# Patient Record
Sex: FEMALE | Race: WHITE | NOT HISPANIC OR LATINO | URBAN - METROPOLITAN AREA
[De-identification: names, ages, dates, MRNs, and addresses within clinical notes are randomized per-mention and may not be internally consistent; named-entity substitution may affect disease eponyms.]

---

## 2017-12-21 ENCOUNTER — EMERGENCY (EMERGENCY)
Facility: HOSPITAL | Age: 82
LOS: 0 days | Discharge: HOME | End: 2017-12-21
Admitting: INTERNAL MEDICINE

## 2017-12-21 DIAGNOSIS — R07.81 PLEURODYNIA: ICD-10-CM
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
## 2017-12-21 DIAGNOSIS — M25.511 PAIN IN RIGHT SHOULDER: ICD-10-CM

## 2017-12-21 DIAGNOSIS — I10 ESSENTIAL (PRIMARY) HYPERTENSION: ICD-10-CM
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
## 2018-01-01 ENCOUNTER — EMERGENCY (EMERGENCY)
Facility: HOSPITAL | Age: 83
LOS: 0 days | Discharge: HOME | End: 2018-04-03
Attending: EMERGENCY MEDICINE

## 2018-01-01 VITALS
RESPIRATION RATE: 20 BRPM | DIASTOLIC BLOOD PRESSURE: 96 MMHG | SYSTOLIC BLOOD PRESSURE: 186 MMHG | HEART RATE: 88 BPM | TEMPERATURE: 97 F | OXYGEN SATURATION: 98 % | WEIGHT: 149.91 LBS

## 2018-01-01 DIAGNOSIS — S80.812A ABRASION, LEFT LOWER LEG, INITIAL ENCOUNTER: ICD-10-CM
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
## 2018-01-01 DIAGNOSIS — Z23 ENCOUNTER FOR IMMUNIZATION: ICD-10-CM

## 2018-01-01 DIAGNOSIS — S80.12XA CONTUSION OF LEFT LOWER LEG, INITIAL ENCOUNTER: ICD-10-CM

## 2018-01-01 RX ORDER — TETANUS TOXOID, REDUCED DIPHTHERIA TOXOID AND ACELLULAR PERTUSSIS VACCINE, ADSORBED 5; 2.5; 8; 8; 2.5 [IU]/.5ML; [IU]/.5ML; UG/.5ML; UG/.5ML; UG/.5ML
0.5 SUSPENSION INTRAMUSCULAR ONCE
Qty: 0 | Refills: 0 | Status: DISCONTINUED | OUTPATIENT
Start: 2018-01-01 | End: 2018-01-01

## 2018-01-01 RX ORDER — TETANUS TOXOID, REDUCED DIPHTHERIA TOXOID AND ACELLULAR PERTUSSIS VACCINE, ADSORBED 5; 2.5; 8; 8; 2.5 [IU]/.5ML; [IU]/.5ML; UG/.5ML; UG/.5ML; UG/.5ML
0.5 SUSPENSION INTRAMUSCULAR ONCE
Qty: 0 | Refills: 0 | Status: COMPLETED | OUTPATIENT
Start: 2018-01-01 | End: 2018-01-01

## 2018-01-01 RX ORDER — ACETAMINOPHEN 500 MG
975 TABLET ORAL ONCE
Qty: 0 | Refills: 0 | Status: COMPLETED | OUTPATIENT
Start: 2018-01-01 | End: 2018-01-01

## 2018-01-01 RX ADMIN — Medication 975 MILLIGRAM(S): at 22:08

## 2018-01-01 RX ADMIN — TETANUS TOXOID, REDUCED DIPHTHERIA TOXOID AND ACELLULAR PERTUSSIS VACCINE, ADSORBED 0.5 MILLILITER(S): 5; 2.5; 8; 8; 2.5 SUSPENSION INTRAMUSCULAR at 22:08

## 2018-04-03 NOTE — ED PROVIDER NOTE - CARE PLAN
Principal Discharge DX:	Contusion of leg, left, initial encounter  Secondary Diagnosis:	Abrasion of leg, left

## 2018-04-03 NOTE — ED PROVIDER NOTE - OBJECTIVE STATEMENT
89 y/o female s/p fall sustaining abrasion and swelling to left anterior lower leg. no head injury, neck or back pain. patient unknown tetanus vaccination hx . no hip pain , tingling to distal extremity

## 2018-04-03 NOTE — ED PROVIDER NOTE - NS ED ROS FT
Review of Systems    Constitutional: (-) fever/ chills (-) weight loss  Eyes/ENT: (-) blurry vision, (-) epistaxis (-) sore throat (-) ear pain  Cardiovascular: (-) chest pain, (-) syncope (-) palpitations  Respiratory: (-) cough, (-) shortness of breath  Gastrointestinal: (-) vomiting, (-) diarrhea (-) abdominal pain  Musculoskeletal: (-) neck pain, (-) back pain, (-) joint pain (-) pedal edema   Integumentary: (-) rash, (-) swelling  Neurological: (-) headache, (-) altered mental status  Psychiatric: (-) hallucinations or depression   Allergic/Immunologic: (-) pruritus

## 2018-04-03 NOTE — ED PROVIDER NOTE - ATTENDING CONTRIBUTION TO CARE
89 yo f not on blood thinners presents with left leg injury.  pt was walking down steps and slipped and cut left leg.  no head injury, no loc, no neck pain, no neck stiffness.  no numbness.  pt only with leg injury.  awake, alert.  LLE: 2+ dp pulse, motor/sensation intact in foot.  no knee tenderness.  lower leg with abrasion no laceration, nothing to be repaired or sutured.  no foot tenderness.  p:  tetanus, xray, dc with outpatient follow up.

## 2019-01-01 ENCOUNTER — OUTPATIENT (OUTPATIENT)
Dept: OUTPATIENT SERVICES | Facility: HOSPITAL | Age: 84
LOS: 1 days | Discharge: HOME | End: 2019-01-01

## 2019-01-01 ENCOUNTER — INPATIENT (INPATIENT)
Facility: HOSPITAL | Age: 84
LOS: 10 days | End: 2019-03-08
Attending: INTERNAL MEDICINE | Admitting: INTERNAL MEDICINE
Payer: MEDICARE

## 2019-01-01 ENCOUNTER — INPATIENT (INPATIENT)
Facility: HOSPITAL | Age: 84
LOS: 8 days | Discharge: NURSING, REHAB OR LONG-TERM CARE | End: 2019-01-26
Attending: INTERNAL MEDICINE | Admitting: INTERNAL MEDICINE

## 2019-01-01 VITALS
DIASTOLIC BLOOD PRESSURE: 51 MMHG | RESPIRATION RATE: 18 BRPM | HEART RATE: 138 BPM | SYSTOLIC BLOOD PRESSURE: 98 MMHG | TEMPERATURE: 97 F

## 2019-01-01 VITALS
DIASTOLIC BLOOD PRESSURE: 118 MMHG | OXYGEN SATURATION: 100 % | RESPIRATION RATE: 20 BRPM | HEART RATE: 74 BPM | SYSTOLIC BLOOD PRESSURE: 115 MMHG | TEMPERATURE: 96 F | WEIGHT: 119.05 LBS

## 2019-01-01 VITALS
RESPIRATION RATE: 20 BRPM | OXYGEN SATURATION: 99 % | HEART RATE: 84 BPM | DIASTOLIC BLOOD PRESSURE: 67 MMHG | TEMPERATURE: 97 F | SYSTOLIC BLOOD PRESSURE: 141 MMHG

## 2019-01-01 VITALS — TEMPERATURE: 96 F | RESPIRATION RATE: 18 BRPM | HEART RATE: 74 BPM
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
## 2019-01-01 DIAGNOSIS — R79.9 ABNORMAL FINDING OF BLOOD CHEMISTRY, UNSPECIFIED: ICD-10-CM

## 2019-01-01 DIAGNOSIS — E87.1 HYPO-OSMOLALITY AND HYPONATREMIA: ICD-10-CM

## 2019-01-01 DIAGNOSIS — K59.00 CONSTIPATION, UNSPECIFIED: ICD-10-CM

## 2019-01-01 DIAGNOSIS — R01.1 CARDIAC MURMUR, UNSPECIFIED: ICD-10-CM

## 2019-01-01 DIAGNOSIS — Z02.9 ENCOUNTER FOR ADMINISTRATIVE EXAMINATIONS, UNSPECIFIED: ICD-10-CM

## 2019-01-01 DIAGNOSIS — I10 ESSENTIAL (PRIMARY) HYPERTENSION: ICD-10-CM

## 2019-01-01 DIAGNOSIS — D64.9 ANEMIA, UNSPECIFIED: ICD-10-CM

## 2019-01-01 DIAGNOSIS — S01.01XA LACERATION WITHOUT FOREIGN BODY OF SCALP, INITIAL ENCOUNTER: ICD-10-CM

## 2019-01-01 DIAGNOSIS — R19.7 DIARRHEA, UNSPECIFIED: ICD-10-CM

## 2019-01-01 DIAGNOSIS — S72.142A DISPLACED INTERTROCHANTERIC FRACTURE OF LEFT FEMUR, INITIAL ENCOUNTER FOR CLOSED FRACTURE: ICD-10-CM

## 2019-01-01 DIAGNOSIS — Z79.82 LONG TERM (CURRENT) USE OF ASPIRIN: ICD-10-CM

## 2019-01-01 DIAGNOSIS — E83.52 HYPERCALCEMIA: ICD-10-CM

## 2019-01-01 DIAGNOSIS — E87.5 HYPERKALEMIA: ICD-10-CM

## 2019-01-01 DIAGNOSIS — E86.0 DEHYDRATION: ICD-10-CM
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
## 2019-01-01 DIAGNOSIS — N17.9 ACUTE KIDNEY FAILURE, UNSPECIFIED: ICD-10-CM

## 2019-01-01 DIAGNOSIS — D62 ACUTE POSTHEMORRHAGIC ANEMIA: ICD-10-CM

## 2019-01-01 LAB
-  AMPICILLIN: SIGNIFICANT CHANGE UP
-  CIPROFLOXACIN: SIGNIFICANT CHANGE UP
-  DAPTOMYCIN: SIGNIFICANT CHANGE UP
-  ERYTHROMYCIN: SIGNIFICANT CHANGE UP
-  LEVOFLOXACIN: SIGNIFICANT CHANGE UP
-  LINEZOLID: SIGNIFICANT CHANGE UP
-  PENICILLIN: SIGNIFICANT CHANGE UP
-  RIFAMPIN: SIGNIFICANT CHANGE UP
-  TETRACYCLINE: SIGNIFICANT CHANGE UP
-  VANCOMYCIN: SIGNIFICANT CHANGE UP
A1AT SERPL-MCNC: 141 MG/DL — SIGNIFICANT CHANGE UP (ref 90–200)
AFP-TM SERPL-MCNC: 911 NG/ML — HIGH
ALBUMIN SERPL ELPH-MCNC: 1.7 G/DL — LOW (ref 3.5–5.2)
ALBUMIN SERPL ELPH-MCNC: 1.8 G/DL — LOW (ref 3.5–5.2)
ALBUMIN SERPL ELPH-MCNC: 1.9 G/DL — LOW (ref 3.5–5.2)
ALBUMIN SERPL ELPH-MCNC: 2.2 G/DL — LOW (ref 3.5–5.2)
ALBUMIN SERPL ELPH-MCNC: 2.3 G/DL — LOW (ref 3.5–5.2)
ALBUMIN SERPL ELPH-MCNC: 2.7 G/DL — LOW (ref 3.5–5.2)
ALBUMIN SERPL ELPH-MCNC: 2.9 G/DL — LOW (ref 3.5–5.2)
ALP SERPL-CCNC: 145 U/L — HIGH (ref 30–115)
ALP SERPL-CCNC: 146 U/L — HIGH (ref 30–115)
ALP SERPL-CCNC: 155 U/L — HIGH (ref 30–115)
ALP SERPL-CCNC: 163 U/L — HIGH (ref 30–115)
ALP SERPL-CCNC: 167 U/L — HIGH (ref 30–115)
ALP SERPL-CCNC: 170 U/L — HIGH (ref 30–115)
ALP SERPL-CCNC: 180 U/L — HIGH (ref 30–115)
ALP SERPL-CCNC: 189 U/L — HIGH (ref 30–115)
ALP SERPL-CCNC: 261 U/L — HIGH (ref 30–115)
ALT FLD-CCNC: 29 U/L — SIGNIFICANT CHANGE UP (ref 0–41)
ALT FLD-CCNC: 34 U/L — SIGNIFICANT CHANGE UP (ref 0–41)
ALT FLD-CCNC: 39 U/L — SIGNIFICANT CHANGE UP (ref 0–41)
ALT FLD-CCNC: 44 U/L — HIGH (ref 0–41)
ALT FLD-CCNC: 45 U/L — HIGH (ref 0–41)
ALT FLD-CCNC: 49 U/L — HIGH (ref 0–41)
ALT FLD-CCNC: 50 U/L — HIGH (ref 0–41)
ALT FLD-CCNC: 51 U/L — HIGH (ref 0–41)
ALT FLD-CCNC: 51 U/L — HIGH (ref 0–41)
ANA PAT FLD IF-IMP: ABNORMAL
ANA PATTERN 2: ABNORMAL
ANA TITR SER: ABNORMAL
ANION GAP SERPL CALC-SCNC: 10 MMOL/L — SIGNIFICANT CHANGE UP (ref 7–14)
ANION GAP SERPL CALC-SCNC: 10 MMOL/L — SIGNIFICANT CHANGE UP (ref 7–14)
ANION GAP SERPL CALC-SCNC: 12 MMOL/L — SIGNIFICANT CHANGE UP (ref 7–14)
ANION GAP SERPL CALC-SCNC: 13 MMOL/L — SIGNIFICANT CHANGE UP (ref 7–14)
ANION GAP SERPL CALC-SCNC: 13 MMOL/L — SIGNIFICANT CHANGE UP (ref 7–14)
ANION GAP SERPL CALC-SCNC: 14 MMOL/L — SIGNIFICANT CHANGE UP (ref 7–14)
ANION GAP SERPL CALC-SCNC: 15 MMOL/L — HIGH (ref 7–14)
ANION GAP SERPL CALC-SCNC: 15 MMOL/L — HIGH (ref 7–14)
ANION GAP SERPL CALC-SCNC: 16 MMOL/L — HIGH (ref 7–14)
ANION GAP SERPL CALC-SCNC: 17 MMOL/L — HIGH (ref 7–14)
ANION GAP SERPL CALC-SCNC: 8 MMOL/L — SIGNIFICANT CHANGE UP (ref 7–14)
ANION GAP SERPL CALC-SCNC: 9 MMOL/L — SIGNIFICANT CHANGE UP (ref 7–14)
ANION GAP SERPL CALC-SCNC: 9 MMOL/L — SIGNIFICANT CHANGE UP (ref 7–14)
ANTI NUCLEAR FACTOR TITER 2: ABNORMAL
APTT BLD: 127.7 SEC — CRITICAL HIGH (ref 27–39.2)
APTT BLD: 30.5 SEC — SIGNIFICANT CHANGE UP (ref 27–39.2)
APTT BLD: 31.2 SEC — SIGNIFICANT CHANGE UP (ref 27–39.2)
APTT BLD: 31.6 SEC — SIGNIFICANT CHANGE UP (ref 27–39.2)
APTT BLD: 33.4 SEC — SIGNIFICANT CHANGE UP (ref 27–39.2)
APTT BLD: 33.6 SEC — SIGNIFICANT CHANGE UP (ref 27–39.2)
APTT BLD: 70.4 SEC — CRITICAL HIGH (ref 27–39.2)
APTT BLD: 70.6 SEC — CRITICAL HIGH (ref 27–39.2)
APTT BLD: 80 SEC — CRITICAL HIGH (ref 27–39.2)
APTT BLD: >200 SEC — CRITICAL HIGH (ref 27–39.2)
AST SERPL-CCNC: 100 U/L — HIGH (ref 0–41)
AST SERPL-CCNC: 107 U/L — HIGH (ref 0–41)
AST SERPL-CCNC: 108 U/L — HIGH (ref 0–41)
AST SERPL-CCNC: 129 U/L — HIGH (ref 0–41)
AST SERPL-CCNC: 145 U/L — HIGH (ref 0–41)
AST SERPL-CCNC: 77 U/L — HIGH (ref 0–41)
AST SERPL-CCNC: 79 U/L — HIGH (ref 0–41)
AST SERPL-CCNC: 88 U/L — HIGH (ref 0–41)
AST SERPL-CCNC: 95 U/L — HIGH (ref 0–41)
BASOPHILS # BLD AUTO: 0.01 K/UL — SIGNIFICANT CHANGE UP (ref 0–0.2)
BASOPHILS # BLD AUTO: 0.02 K/UL — SIGNIFICANT CHANGE UP (ref 0–0.2)
BASOPHILS # BLD AUTO: 0.02 K/UL — SIGNIFICANT CHANGE UP (ref 0–0.2)
BASOPHILS # BLD AUTO: 0.03 K/UL — SIGNIFICANT CHANGE UP (ref 0–0.2)
BASOPHILS # BLD AUTO: 0.04 K/UL — SIGNIFICANT CHANGE UP (ref 0–0.2)
BASOPHILS # BLD AUTO: 0.04 K/UL — SIGNIFICANT CHANGE UP (ref 0–0.2)
BASOPHILS NFR BLD AUTO: 0.1 % — SIGNIFICANT CHANGE UP (ref 0–1)
BASOPHILS NFR BLD AUTO: 0.2 % — SIGNIFICANT CHANGE UP (ref 0–1)
BASOPHILS NFR BLD AUTO: 0.4 % — SIGNIFICANT CHANGE UP (ref 0–1)
BASOPHILS NFR BLD AUTO: 0.5 % — SIGNIFICANT CHANGE UP (ref 0–1)
BILIRUB SERPL-MCNC: 0.5 MG/DL — SIGNIFICANT CHANGE UP (ref 0.2–1.2)
BILIRUB SERPL-MCNC: 0.6 MG/DL — SIGNIFICANT CHANGE UP (ref 0.2–1.2)
BILIRUB SERPL-MCNC: 0.6 MG/DL — SIGNIFICANT CHANGE UP (ref 0.2–1.2)
BILIRUB SERPL-MCNC: 0.8 MG/DL — SIGNIFICANT CHANGE UP (ref 0.2–1.2)
BILIRUB SERPL-MCNC: 0.8 MG/DL — SIGNIFICANT CHANGE UP (ref 0.2–1.2)
BILIRUB SERPL-MCNC: 0.9 MG/DL — SIGNIFICANT CHANGE UP (ref 0.2–1.2)
BILIRUB SERPL-MCNC: 0.9 MG/DL — SIGNIFICANT CHANGE UP (ref 0.2–1.2)
BILIRUB SERPL-MCNC: 1 MG/DL — SIGNIFICANT CHANGE UP (ref 0.2–1.2)
BILIRUB SERPL-MCNC: 1 MG/DL — SIGNIFICANT CHANGE UP (ref 0.2–1.2)
BLD GP AB SCN SERPL QL: SIGNIFICANT CHANGE UP
BUN SERPL-MCNC: 105 MG/DL — CRITICAL HIGH (ref 10–20)
BUN SERPL-MCNC: 106 MG/DL — CRITICAL HIGH (ref 10–20)
BUN SERPL-MCNC: 108 MG/DL — CRITICAL HIGH (ref 10–20)
BUN SERPL-MCNC: 109 MG/DL — CRITICAL HIGH (ref 10–20)
BUN SERPL-MCNC: 110 MG/DL — CRITICAL HIGH (ref 10–20)
BUN SERPL-MCNC: 111 MG/DL — CRITICAL HIGH (ref 10–20)
BUN SERPL-MCNC: 115 MG/DL — CRITICAL HIGH (ref 10–20)
BUN SERPL-MCNC: 28 MG/DL — HIGH (ref 10–20)
BUN SERPL-MCNC: 31 MG/DL — HIGH (ref 10–20)
BUN SERPL-MCNC: 33 MG/DL — HIGH (ref 10–20)
BUN SERPL-MCNC: 36 MG/DL — HIGH (ref 10–20)
BUN SERPL-MCNC: 48 MG/DL — HIGH (ref 10–20)
BUN SERPL-MCNC: 57 MG/DL — HIGH (ref 10–20)
BUN SERPL-MCNC: 59 MG/DL — HIGH (ref 10–20)
BUN SERPL-MCNC: 63 MG/DL — CRITICAL HIGH (ref 10–20)
BUN SERPL-MCNC: 63 MG/DL — CRITICAL HIGH (ref 10–20)
BUN SERPL-MCNC: 64 MG/DL — CRITICAL HIGH (ref 10–20)
BUN SERPL-MCNC: 82 MG/DL — CRITICAL HIGH (ref 10–20)
BUN SERPL-MCNC: 90 MG/DL — CRITICAL HIGH (ref 10–20)
BUN SERPL-MCNC: 93 MG/DL — CRITICAL HIGH (ref 10–20)
CALCIUM SERPL-MCNC: 10 MG/DL — SIGNIFICANT CHANGE UP (ref 8.5–10.1)
CALCIUM SERPL-MCNC: 10.3 MG/DL — HIGH (ref 8.5–10.1)
CALCIUM SERPL-MCNC: 10.5 MG/DL — HIGH (ref 8.5–10.1)
CALCIUM SERPL-MCNC: 10.6 MG/DL — HIGH (ref 8.5–10.1)
CALCIUM SERPL-MCNC: 10.7 MG/DL — HIGH (ref 8.5–10.1)
CALCIUM SERPL-MCNC: 7.8 MG/DL — LOW (ref 8.5–10.1)
CALCIUM SERPL-MCNC: 8.3 MG/DL — LOW (ref 8.5–10.1)
CALCIUM SERPL-MCNC: 8.4 MG/DL — LOW (ref 8.5–10.1)
CALCIUM SERPL-MCNC: 8.4 MG/DL — LOW (ref 8.5–10.1)
CALCIUM SERPL-MCNC: 8.5 MG/DL — SIGNIFICANT CHANGE UP (ref 8.5–10.1)
CALCIUM SERPL-MCNC: 8.7 MG/DL — SIGNIFICANT CHANGE UP (ref 8.5–10.1)
CALCIUM SERPL-MCNC: 8.7 MG/DL — SIGNIFICANT CHANGE UP (ref 8.5–10.1)
CALCIUM SERPL-MCNC: 8.9 MG/DL — SIGNIFICANT CHANGE UP (ref 8.5–10.1)
CALCIUM SERPL-MCNC: 9 MG/DL — SIGNIFICANT CHANGE UP (ref 8.5–10.1)
CALCIUM SERPL-MCNC: 9.2 MG/DL — SIGNIFICANT CHANGE UP (ref 8.5–10.1)
CALCIUM SERPL-MCNC: 9.2 MG/DL — SIGNIFICANT CHANGE UP (ref 8.5–10.1)
CALCIUM SERPL-MCNC: 9.4 MG/DL — SIGNIFICANT CHANGE UP (ref 8.5–10.1)
CALCIUM SERPL-MCNC: 9.6 MG/DL — SIGNIFICANT CHANGE UP (ref 8.5–10.1)
CALCIUM SERPL-MCNC: 9.8 MG/DL — SIGNIFICANT CHANGE UP (ref 8.5–10.1)
CERULOPLASMIN SERPL-MCNC: 17 MG/DL — SIGNIFICANT CHANGE UP (ref 16–45)
CHLORIDE SERPL-SCNC: 100 MMOL/L — SIGNIFICANT CHANGE UP (ref 98–110)
CHLORIDE SERPL-SCNC: 100 MMOL/L — SIGNIFICANT CHANGE UP (ref 98–110)
CHLORIDE SERPL-SCNC: 101 MMOL/L — SIGNIFICANT CHANGE UP (ref 98–110)
CHLORIDE SERPL-SCNC: 102 MMOL/L — SIGNIFICANT CHANGE UP (ref 98–110)
CHLORIDE SERPL-SCNC: 103 MMOL/L — SIGNIFICANT CHANGE UP (ref 98–110)
CHLORIDE SERPL-SCNC: 106 MMOL/L — SIGNIFICANT CHANGE UP (ref 98–110)
CHLORIDE SERPL-SCNC: 110 MMOL/L — SIGNIFICANT CHANGE UP (ref 98–110)
CHLORIDE SERPL-SCNC: 91 MMOL/L — LOW (ref 98–110)
CHLORIDE SERPL-SCNC: 92 MMOL/L — LOW (ref 98–110)
CHLORIDE SERPL-SCNC: 93 MMOL/L — LOW (ref 98–110)
CHLORIDE SERPL-SCNC: 94 MMOL/L — LOW (ref 98–110)
CHLORIDE SERPL-SCNC: 95 MMOL/L — LOW (ref 98–110)
CHLORIDE SERPL-SCNC: 96 MMOL/L — LOW (ref 98–110)
CHLORIDE SERPL-SCNC: 97 MMOL/L — LOW (ref 98–110)
CHLORIDE SERPL-SCNC: 99 MMOL/L — SIGNIFICANT CHANGE UP (ref 98–110)
CHLORIDE SERPL-SCNC: 99 MMOL/L — SIGNIFICANT CHANGE UP (ref 98–110)
CK MB CFR SERPL CALC: 1.9 NG/ML — SIGNIFICANT CHANGE UP (ref 0.6–6.3)
CK MB CFR SERPL CALC: 2.8 NG/ML — SIGNIFICANT CHANGE UP (ref 0.6–6.3)
CK SERPL-CCNC: 75 U/L — SIGNIFICANT CHANGE UP (ref 0–225)
CK SERPL-CCNC: 76 U/L — SIGNIFICANT CHANGE UP (ref 0–225)
CO2 SERPL-SCNC: 18 MMOL/L — SIGNIFICANT CHANGE UP (ref 17–32)
CO2 SERPL-SCNC: 21 MMOL/L — SIGNIFICANT CHANGE UP (ref 17–32)
CO2 SERPL-SCNC: 23 MMOL/L — SIGNIFICANT CHANGE UP (ref 17–32)
CO2 SERPL-SCNC: 24 MMOL/L — SIGNIFICANT CHANGE UP (ref 17–32)
CO2 SERPL-SCNC: 25 MMOL/L — SIGNIFICANT CHANGE UP (ref 17–32)
CO2 SERPL-SCNC: 26 MMOL/L — SIGNIFICANT CHANGE UP (ref 17–32)
CO2 SERPL-SCNC: 26 MMOL/L — SIGNIFICANT CHANGE UP (ref 17–32)
CO2 SERPL-SCNC: 27 MMOL/L — SIGNIFICANT CHANGE UP (ref 17–32)
CO2 SERPL-SCNC: 28 MMOL/L — SIGNIFICANT CHANGE UP (ref 17–32)
CO2 SERPL-SCNC: 28 MMOL/L — SIGNIFICANT CHANGE UP (ref 17–32)
CO2 SERPL-SCNC: 29 MMOL/L — SIGNIFICANT CHANGE UP (ref 17–32)
CO2 SERPL-SCNC: 29 MMOL/L — SIGNIFICANT CHANGE UP (ref 17–32)
CO2 SERPL-SCNC: 30 MMOL/L — SIGNIFICANT CHANGE UP (ref 17–32)
CO2 SERPL-SCNC: 31 MMOL/L — SIGNIFICANT CHANGE UP (ref 17–32)
CREAT SERPL-MCNC: 0.9 MG/DL — SIGNIFICANT CHANGE UP (ref 0.7–1.5)
CREAT SERPL-MCNC: 1 MG/DL — SIGNIFICANT CHANGE UP (ref 0.7–1.5)
CREAT SERPL-MCNC: 1.2 MG/DL — SIGNIFICANT CHANGE UP (ref 0.7–1.5)
CREAT SERPL-MCNC: 1.4 MG/DL — SIGNIFICANT CHANGE UP (ref 0.7–1.5)
CREAT SERPL-MCNC: 1.5 MG/DL — SIGNIFICANT CHANGE UP (ref 0.7–1.5)
CREAT SERPL-MCNC: 1.6 MG/DL — HIGH (ref 0.7–1.5)
CREAT SERPL-MCNC: 1.8 MG/DL — HIGH (ref 0.7–1.5)
CREAT SERPL-MCNC: 1.8 MG/DL — HIGH (ref 0.7–1.5)
CULTURE RESULTS: SIGNIFICANT CHANGE UP
D DIMER BLD IA.RAPID-MCNC: 1134 NG/ML DDU — HIGH (ref 0–230)
EOSINOPHIL # BLD AUTO: 0 K/UL — SIGNIFICANT CHANGE UP (ref 0–0.7)
EOSINOPHIL # BLD AUTO: 0.01 K/UL — SIGNIFICANT CHANGE UP (ref 0–0.7)
EOSINOPHIL # BLD AUTO: 0.03 K/UL — SIGNIFICANT CHANGE UP (ref 0–0.7)
EOSINOPHIL # BLD AUTO: 0.04 K/UL — SIGNIFICANT CHANGE UP (ref 0–0.7)
EOSINOPHIL # BLD AUTO: 0.05 K/UL — SIGNIFICANT CHANGE UP (ref 0–0.7)
EOSINOPHIL # BLD AUTO: 0.05 K/UL — SIGNIFICANT CHANGE UP (ref 0–0.7)
EOSINOPHIL # BLD AUTO: 0.06 K/UL — SIGNIFICANT CHANGE UP (ref 0–0.7)
EOSINOPHIL # BLD AUTO: 0.08 K/UL — SIGNIFICANT CHANGE UP (ref 0–0.7)
EOSINOPHIL # BLD AUTO: 0.08 K/UL — SIGNIFICANT CHANGE UP (ref 0–0.7)
EOSINOPHIL # BLD AUTO: 0.21 K/UL — SIGNIFICANT CHANGE UP (ref 0–0.7)
EOSINOPHIL NFR BLD AUTO: 0 % — SIGNIFICANT CHANGE UP (ref 0–8)
EOSINOPHIL NFR BLD AUTO: 0.1 % — SIGNIFICANT CHANGE UP (ref 0–8)
EOSINOPHIL NFR BLD AUTO: 0.2 % — SIGNIFICANT CHANGE UP (ref 0–8)
EOSINOPHIL NFR BLD AUTO: 0.3 % — SIGNIFICANT CHANGE UP (ref 0–8)
EOSINOPHIL NFR BLD AUTO: 0.5 % — SIGNIFICANT CHANGE UP (ref 0–8)
EOSINOPHIL NFR BLD AUTO: 0.7 % — SIGNIFICANT CHANGE UP (ref 0–8)
EOSINOPHIL NFR BLD AUTO: 0.7 % — SIGNIFICANT CHANGE UP (ref 0–8)
EOSINOPHIL NFR BLD AUTO: 1.4 % — SIGNIFICANT CHANGE UP (ref 0–8)
ERYTHROCYTE [SEDIMENTATION RATE] IN BLOOD: 109 MM/HR — HIGH (ref 0–20)
FERRITIN SERPL-MCNC: 414 NG/ML — HIGH (ref 15–150)
FERRITIN SERPL-MCNC: 560 NG/ML — HIGH (ref 15–150)
GLUCOSE BLDC GLUCOMTR-MCNC: 118 MG/DL — HIGH (ref 70–99)
GLUCOSE BLDC GLUCOMTR-MCNC: 134 MG/DL — HIGH (ref 70–99)
GLUCOSE BLDC GLUCOMTR-MCNC: 156 MG/DL — HIGH (ref 70–99)
GLUCOSE SERPL-MCNC: 102 MG/DL — HIGH (ref 70–99)
GLUCOSE SERPL-MCNC: 108 MG/DL — HIGH (ref 70–99)
GLUCOSE SERPL-MCNC: 115 MG/DL — HIGH (ref 70–99)
GLUCOSE SERPL-MCNC: 119 MG/DL — HIGH (ref 70–99)
GLUCOSE SERPL-MCNC: 122 MG/DL — HIGH (ref 70–99)
GLUCOSE SERPL-MCNC: 124 MG/DL — HIGH (ref 70–99)
GLUCOSE SERPL-MCNC: 133 MG/DL — HIGH (ref 70–99)
GLUCOSE SERPL-MCNC: 134 MG/DL — HIGH (ref 70–99)
GLUCOSE SERPL-MCNC: 142 MG/DL — HIGH (ref 70–99)
GLUCOSE SERPL-MCNC: 147 MG/DL — HIGH (ref 70–99)
GLUCOSE SERPL-MCNC: 151 MG/DL — HIGH (ref 70–99)
GLUCOSE SERPL-MCNC: 157 MG/DL — HIGH (ref 70–99)
GLUCOSE SERPL-MCNC: 169 MG/DL — HIGH (ref 70–99)
GLUCOSE SERPL-MCNC: 174 MG/DL — HIGH (ref 70–99)
GLUCOSE SERPL-MCNC: 177 MG/DL — HIGH (ref 70–99)
GLUCOSE SERPL-MCNC: 180 MG/DL — HIGH (ref 70–99)
GLUCOSE SERPL-MCNC: 248 MG/DL — HIGH (ref 70–99)
GLUCOSE SERPL-MCNC: 248 MG/DL — HIGH (ref 70–99)
GLUCOSE SERPL-MCNC: 253 MG/DL — HIGH (ref 70–99)
GLUCOSE SERPL-MCNC: 50 MG/DL — LOW (ref 70–99)
GLUCOSE SERPL-MCNC: 77 MG/DL — SIGNIFICANT CHANGE UP (ref 70–99)
HAV IGG SER QL IA: REACTIVE
HAV IGM SER-ACNC: SIGNIFICANT CHANGE UP
HBV CORE AB SER-ACNC: SIGNIFICANT CHANGE UP
HBV CORE IGM SER-ACNC: SIGNIFICANT CHANGE UP
HBV SURFACE AG SER-ACNC: SIGNIFICANT CHANGE UP
HCT VFR BLD CALC: 19.7 % — LOW (ref 37–47)
HCT VFR BLD CALC: 20.3 % — LOW (ref 37–47)
HCT VFR BLD CALC: 24.4 % — LOW (ref 37–47)
HCT VFR BLD CALC: 24.7 % — LOW (ref 37–47)
HCT VFR BLD CALC: 25.3 % — LOW (ref 37–47)
HCT VFR BLD CALC: 25.6 % — LOW (ref 37–47)
HCT VFR BLD CALC: 25.9 % — LOW (ref 37–47)
HCT VFR BLD CALC: 26.2 % — LOW (ref 37–47)
HCT VFR BLD CALC: 26.4 % — LOW (ref 37–47)
HCT VFR BLD CALC: 26.8 % — LOW (ref 37–47)
HCT VFR BLD CALC: 27 % — LOW (ref 37–47)
HCT VFR BLD CALC: 27 % — LOW (ref 37–47)
HCT VFR BLD CALC: 27.3 % — LOW (ref 37–47)
HCT VFR BLD CALC: 28.4 % — LOW (ref 37–47)
HCT VFR BLD CALC: 28.6 % — LOW (ref 37–47)
HCT VFR BLD CALC: 28.8 % — LOW (ref 37–47)
HCT VFR BLD CALC: 30.8 % — LOW (ref 37–47)
HCT VFR BLD CALC: 31.5 % — LOW (ref 37–47)
HCT VFR BLD CALC: 32.6 % — LOW (ref 37–47)
HCT VFR BLD CALC: 32.8 % — LOW (ref 37–47)
HCT VFR BLD CALC: 32.9 % — LOW (ref 37–47)
HCT VFR BLD CALC: 33 % — LOW (ref 37–47)
HCT VFR BLD CALC: 34 % — LOW (ref 37–47)
HCT VFR BLD CALC: 34.4 % — LOW (ref 37–47)
HCT VFR BLD CALC: 34.6 % — LOW (ref 37–47)
HCV AB S/CO SERPL IA: 11.84 S/CO — HIGH (ref 0–0.79)
HCV AB SERPL-IMP: REACTIVE
HCV RNA FLD QL NAA+PROBE: SIGNIFICANT CHANGE UP
HCV RNA FLD QL NAA+PROBE: SIGNIFICANT CHANGE UP
HCV RNA SPEC QL PROBE+SIG AMP: DETECTED
HCV RNA SPEC QL PROBE+SIG AMP: DETECTED
HGB BLD-MCNC: 10.3 G/DL — LOW (ref 12–16)
HGB BLD-MCNC: 10.4 G/DL — LOW (ref 12–16)
HGB BLD-MCNC: 10.6 G/DL — LOW (ref 12–16)
HGB BLD-MCNC: 11 G/DL — LOW (ref 12–16)
HGB BLD-MCNC: 11.4 G/DL — LOW (ref 12–16)
HGB BLD-MCNC: 11.7 G/DL — LOW (ref 12–16)
HGB BLD-MCNC: 11.7 G/DL — LOW (ref 12–16)
HGB BLD-MCNC: 6.4 G/DL — CRITICAL LOW (ref 12–16)
HGB BLD-MCNC: 6.7 G/DL — CRITICAL LOW (ref 12–16)
HGB BLD-MCNC: 7.9 G/DL — LOW (ref 12–16)
HGB BLD-MCNC: 7.9 G/DL — LOW (ref 12–16)
HGB BLD-MCNC: 8.3 G/DL — LOW (ref 12–16)
HGB BLD-MCNC: 8.3 G/DL — LOW (ref 12–16)
HGB BLD-MCNC: 8.4 G/DL — LOW (ref 12–16)
HGB BLD-MCNC: 8.5 G/DL — LOW (ref 12–16)
HGB BLD-MCNC: 8.5 G/DL — LOW (ref 12–16)
HGB BLD-MCNC: 8.7 G/DL — LOW (ref 12–16)
HGB BLD-MCNC: 8.8 G/DL — LOW (ref 12–16)
HGB BLD-MCNC: 9 G/DL — LOW (ref 12–16)
HGB BLD-MCNC: 9.3 G/DL — LOW (ref 12–16)
HGB BLD-MCNC: 9.8 G/DL — LOW (ref 12–16)
IMM GRANULOCYTES NFR BLD AUTO: 0.3 % — SIGNIFICANT CHANGE UP (ref 0.1–0.3)
IMM GRANULOCYTES NFR BLD AUTO: 0.4 % — HIGH (ref 0.1–0.3)
IMM GRANULOCYTES NFR BLD AUTO: 0.5 % — HIGH (ref 0.1–0.3)
IMM GRANULOCYTES NFR BLD AUTO: 0.5 % — HIGH (ref 0.1–0.3)
IMM GRANULOCYTES NFR BLD AUTO: 0.7 % — HIGH (ref 0.1–0.3)
IMM GRANULOCYTES NFR BLD AUTO: 1.2 % — HIGH (ref 0.1–0.3)
IMM GRANULOCYTES NFR BLD AUTO: 1.3 % — HIGH (ref 0.1–0.3)
IMM GRANULOCYTES NFR BLD AUTO: 1.6 % — HIGH (ref 0.1–0.3)
INR BLD: 1.15 RATIO — SIGNIFICANT CHANGE UP (ref 0.65–1.3)
INR BLD: 1.25 RATIO — SIGNIFICANT CHANGE UP (ref 0.65–1.3)
INR BLD: 1.25 RATIO — SIGNIFICANT CHANGE UP (ref 0.65–1.3)
INR BLD: 1.32 RATIO — HIGH (ref 0.65–1.3)
INR BLD: 1.33 RATIO — HIGH (ref 0.65–1.3)
INR BLD: 1.43 RATIO — HIGH (ref 0.65–1.3)
IRON SATN MFR SERPL: 57 % — HIGH (ref 15–50)
IRON SATN MFR SERPL: 91 UG/DL — SIGNIFICANT CHANGE UP (ref 35–150)
LACTATE SERPL-SCNC: 1 MMOL/L — SIGNIFICANT CHANGE UP (ref 0.5–2.2)
LACTATE SERPL-SCNC: 3.8 MMOL/L — HIGH (ref 0.5–2.2)
LYMPHOCYTES # BLD AUTO: 0.81 K/UL — LOW (ref 1.2–3.4)
LYMPHOCYTES # BLD AUTO: 1.56 K/UL — SIGNIFICANT CHANGE UP (ref 1.2–3.4)
LYMPHOCYTES # BLD AUTO: 1.99 K/UL — SIGNIFICANT CHANGE UP (ref 1.2–3.4)
LYMPHOCYTES # BLD AUTO: 13.6 % — LOW (ref 20.5–51.1)
LYMPHOCYTES # BLD AUTO: 15.3 % — LOW (ref 20.5–51.1)
LYMPHOCYTES # BLD AUTO: 17.2 % — LOW (ref 20.5–51.1)
LYMPHOCYTES # BLD AUTO: 19.2 % — LOW (ref 20.5–51.1)
LYMPHOCYTES # BLD AUTO: 2.25 K/UL — SIGNIFICANT CHANGE UP (ref 1.2–3.4)
LYMPHOCYTES # BLD AUTO: 2.97 K/UL — SIGNIFICANT CHANGE UP (ref 1.2–3.4)
LYMPHOCYTES # BLD AUTO: 20.5 % — SIGNIFICANT CHANGE UP (ref 20.5–51.1)
LYMPHOCYTES # BLD AUTO: 21.8 % — SIGNIFICANT CHANGE UP (ref 20.5–51.1)
LYMPHOCYTES # BLD AUTO: 22.4 % — SIGNIFICANT CHANGE UP (ref 20.5–51.1)
LYMPHOCYTES # BLD AUTO: 22.5 % — SIGNIFICANT CHANGE UP (ref 20.5–51.1)
LYMPHOCYTES # BLD AUTO: 23.6 % — SIGNIFICANT CHANGE UP (ref 20.5–51.1)
LYMPHOCYTES # BLD AUTO: 27.6 % — SIGNIFICANT CHANGE UP (ref 20.5–51.1)
LYMPHOCYTES # BLD AUTO: 3.02 K/UL — SIGNIFICANT CHANGE UP (ref 1.2–3.4)
LYMPHOCYTES # BLD AUTO: 3.04 K/UL — SIGNIFICANT CHANGE UP (ref 1.2–3.4)
LYMPHOCYTES # BLD AUTO: 3.11 K/UL — SIGNIFICANT CHANGE UP (ref 1.2–3.4)
LYMPHOCYTES # BLD AUTO: 3.31 K/UL — SIGNIFICANT CHANGE UP (ref 1.2–3.4)
LYMPHOCYTES # BLD AUTO: 3.56 K/UL — HIGH (ref 1.2–3.4)
MAGNESIUM SERPL-MCNC: 1.8 MG/DL — SIGNIFICANT CHANGE UP (ref 1.8–2.4)
MAGNESIUM SERPL-MCNC: 1.8 MG/DL — SIGNIFICANT CHANGE UP (ref 1.8–2.4)
MAGNESIUM SERPL-MCNC: 1.9 MG/DL — SIGNIFICANT CHANGE UP (ref 1.8–2.4)
MAGNESIUM SERPL-MCNC: 2 MG/DL — SIGNIFICANT CHANGE UP (ref 1.8–2.4)
MCHC RBC-ENTMCNC: 29.9 PG — SIGNIFICANT CHANGE UP (ref 27–31)
MCHC RBC-ENTMCNC: 30.2 PG — SIGNIFICANT CHANGE UP (ref 27–31)
MCHC RBC-ENTMCNC: 30.3 PG — SIGNIFICANT CHANGE UP (ref 27–31)
MCHC RBC-ENTMCNC: 30.5 PG — SIGNIFICANT CHANGE UP (ref 27–31)
MCHC RBC-ENTMCNC: 30.6 PG — SIGNIFICANT CHANGE UP (ref 27–31)
MCHC RBC-ENTMCNC: 30.7 PG — SIGNIFICANT CHANGE UP (ref 27–31)
MCHC RBC-ENTMCNC: 30.7 PG — SIGNIFICANT CHANGE UP (ref 27–31)
MCHC RBC-ENTMCNC: 30.9 PG — SIGNIFICANT CHANGE UP (ref 27–31)
MCHC RBC-ENTMCNC: 31 PG — SIGNIFICANT CHANGE UP (ref 27–31)
MCHC RBC-ENTMCNC: 31.1 PG — HIGH (ref 27–31)
MCHC RBC-ENTMCNC: 31.3 PG — HIGH (ref 27–31)
MCHC RBC-ENTMCNC: 31.3 PG — HIGH (ref 27–31)
MCHC RBC-ENTMCNC: 31.5 PG — HIGH (ref 27–31)
MCHC RBC-ENTMCNC: 31.6 PG — HIGH (ref 27–31)
MCHC RBC-ENTMCNC: 31.7 G/DL — LOW (ref 32–37)
MCHC RBC-ENTMCNC: 31.7 G/DL — LOW (ref 32–37)
MCHC RBC-ENTMCNC: 31.9 G/DL — LOW (ref 32–37)
MCHC RBC-ENTMCNC: 32 G/DL — SIGNIFICANT CHANGE UP (ref 32–37)
MCHC RBC-ENTMCNC: 32 PG — HIGH (ref 27–31)
MCHC RBC-ENTMCNC: 32.1 G/DL — SIGNIFICANT CHANGE UP (ref 32–37)
MCHC RBC-ENTMCNC: 32.1 PG — HIGH (ref 27–31)
MCHC RBC-ENTMCNC: 32.2 G/DL — SIGNIFICANT CHANGE UP (ref 32–37)
MCHC RBC-ENTMCNC: 32.2 G/DL — SIGNIFICANT CHANGE UP (ref 32–37)
MCHC RBC-ENTMCNC: 32.3 PG — HIGH (ref 27–31)
MCHC RBC-ENTMCNC: 32.4 G/DL — SIGNIFICANT CHANGE UP (ref 32–37)
MCHC RBC-ENTMCNC: 32.4 G/DL — SIGNIFICANT CHANGE UP (ref 32–37)
MCHC RBC-ENTMCNC: 32.4 PG — HIGH (ref 27–31)
MCHC RBC-ENTMCNC: 32.5 G/DL — SIGNIFICANT CHANGE UP (ref 32–37)
MCHC RBC-ENTMCNC: 32.5 G/DL — SIGNIFICANT CHANGE UP (ref 32–37)
MCHC RBC-ENTMCNC: 32.5 PG — HIGH (ref 27–31)
MCHC RBC-ENTMCNC: 32.6 G/DL — SIGNIFICANT CHANGE UP (ref 32–37)
MCHC RBC-ENTMCNC: 32.7 G/DL — SIGNIFICANT CHANGE UP (ref 32–37)
MCHC RBC-ENTMCNC: 32.8 G/DL — SIGNIFICANT CHANGE UP (ref 32–37)
MCHC RBC-ENTMCNC: 32.8 PG — HIGH (ref 27–31)
MCHC RBC-ENTMCNC: 32.8 PG — HIGH (ref 27–31)
MCHC RBC-ENTMCNC: 32.9 G/DL — SIGNIFICANT CHANGE UP (ref 32–37)
MCHC RBC-ENTMCNC: 33 G/DL — SIGNIFICANT CHANGE UP (ref 32–37)
MCHC RBC-ENTMCNC: 33.2 G/DL — SIGNIFICANT CHANGE UP (ref 32–37)
MCHC RBC-ENTMCNC: 33.4 G/DL — SIGNIFICANT CHANGE UP (ref 32–37)
MCHC RBC-ENTMCNC: 33.6 G/DL — SIGNIFICANT CHANGE UP (ref 32–37)
MCHC RBC-ENTMCNC: 33.7 G/DL — SIGNIFICANT CHANGE UP (ref 32–37)
MCHC RBC-ENTMCNC: 33.7 G/DL — SIGNIFICANT CHANGE UP (ref 32–37)
MCHC RBC-ENTMCNC: 34 G/DL — SIGNIFICANT CHANGE UP (ref 32–37)
MCHC RBC-ENTMCNC: 34 G/DL — SIGNIFICANT CHANGE UP (ref 32–37)
MCHC RBC-ENTMCNC: 34.4 G/DL — SIGNIFICANT CHANGE UP (ref 32–37)
MCHC RBC-ENTMCNC: 34.4 G/DL — SIGNIFICANT CHANGE UP (ref 32–37)
MCV RBC AUTO: 100 FL — HIGH (ref 81–99)
MCV RBC AUTO: 100.4 FL — HIGH (ref 81–99)
MCV RBC AUTO: 101.2 FL — HIGH (ref 81–99)
MCV RBC AUTO: 101.9 FL — HIGH (ref 81–99)
MCV RBC AUTO: 89.2 FL — SIGNIFICANT CHANGE UP (ref 81–99)
MCV RBC AUTO: 89.7 FL — SIGNIFICANT CHANGE UP (ref 81–99)
MCV RBC AUTO: 89.8 FL — SIGNIFICANT CHANGE UP (ref 81–99)
MCV RBC AUTO: 90.1 FL — SIGNIFICANT CHANGE UP (ref 81–99)
MCV RBC AUTO: 90.3 FL — SIGNIFICANT CHANGE UP (ref 81–99)
MCV RBC AUTO: 90.8 FL — SIGNIFICANT CHANGE UP (ref 81–99)
MCV RBC AUTO: 90.8 FL — SIGNIFICANT CHANGE UP (ref 81–99)
MCV RBC AUTO: 90.9 FL — SIGNIFICANT CHANGE UP (ref 81–99)
MCV RBC AUTO: 92.1 FL — SIGNIFICANT CHANGE UP (ref 81–99)
MCV RBC AUTO: 92.8 FL — SIGNIFICANT CHANGE UP (ref 81–99)
MCV RBC AUTO: 94.1 FL — SIGNIFICANT CHANGE UP (ref 81–99)
MCV RBC AUTO: 94.3 FL — SIGNIFICANT CHANGE UP (ref 81–99)
MCV RBC AUTO: 95.4 FL — SIGNIFICANT CHANGE UP (ref 81–99)
MCV RBC AUTO: 95.6 FL — SIGNIFICANT CHANGE UP (ref 81–99)
MCV RBC AUTO: 96.1 FL — SIGNIFICANT CHANGE UP (ref 81–99)
MCV RBC AUTO: 97 FL — SIGNIFICANT CHANGE UP (ref 81–99)
MCV RBC AUTO: 97.9 FL — SIGNIFICANT CHANGE UP (ref 81–99)
MCV RBC AUTO: 98.3 FL — SIGNIFICANT CHANGE UP (ref 81–99)
MCV RBC AUTO: 98.5 FL — SIGNIFICANT CHANGE UP (ref 81–99)
MCV RBC AUTO: 98.8 FL — SIGNIFICANT CHANGE UP (ref 81–99)
MCV RBC AUTO: 98.9 FL — SIGNIFICANT CHANGE UP (ref 81–99)
METHOD TYPE: SIGNIFICANT CHANGE UP
MITOCHONDRIA AB SER-ACNC: SIGNIFICANT CHANGE UP
MONOCYTES # BLD AUTO: 0.47 K/UL — SIGNIFICANT CHANGE UP (ref 0.1–0.6)
MONOCYTES # BLD AUTO: 0.63 K/UL — HIGH (ref 0.1–0.6)
MONOCYTES # BLD AUTO: 0.8 K/UL — HIGH (ref 0.1–0.6)
MONOCYTES # BLD AUTO: 0.82 K/UL — HIGH (ref 0.1–0.6)
MONOCYTES # BLD AUTO: 0.84 K/UL — HIGH (ref 0.1–0.6)
MONOCYTES # BLD AUTO: 1.04 K/UL — HIGH (ref 0.1–0.6)
MONOCYTES # BLD AUTO: 1.06 K/UL — HIGH (ref 0.1–0.6)
MONOCYTES # BLD AUTO: 1.45 K/UL — HIGH (ref 0.1–0.6)
MONOCYTES # BLD AUTO: 1.52 K/UL — HIGH (ref 0.1–0.6)
MONOCYTES # BLD AUTO: 1.53 K/UL — HIGH (ref 0.1–0.6)
MONOCYTES NFR BLD AUTO: 10.4 % — HIGH (ref 1.7–9.3)
MONOCYTES NFR BLD AUTO: 11.4 % — HIGH (ref 1.7–9.3)
MONOCYTES NFR BLD AUTO: 4.3 % — SIGNIFICANT CHANGE UP (ref 1.7–9.3)
MONOCYTES NFR BLD AUTO: 6.3 % — SIGNIFICANT CHANGE UP (ref 1.7–9.3)
MONOCYTES NFR BLD AUTO: 7 % — SIGNIFICANT CHANGE UP (ref 1.7–9.3)
MONOCYTES NFR BLD AUTO: 7.3 % — SIGNIFICANT CHANGE UP (ref 1.7–9.3)
MONOCYTES NFR BLD AUTO: 7.9 % — SIGNIFICANT CHANGE UP (ref 1.7–9.3)
MONOCYTES NFR BLD AUTO: 9.1 % — SIGNIFICANT CHANGE UP (ref 1.7–9.3)
MONOCYTES NFR BLD AUTO: 9.4 % — HIGH (ref 1.7–9.3)
MONOCYTES NFR BLD AUTO: 9.9 % — HIGH (ref 1.7–9.3)
NEUTROPHILS # BLD AUTO: 10.24 K/UL — HIGH (ref 1.4–6.5)
NEUTROPHILS # BLD AUTO: 11.25 K/UL — HIGH (ref 1.4–6.5)
NEUTROPHILS # BLD AUTO: 11.7 K/UL — HIGH (ref 1.4–6.5)
NEUTROPHILS # BLD AUTO: 4.59 K/UL — SIGNIFICANT CHANGE UP (ref 1.4–6.5)
NEUTROPHILS # BLD AUTO: 5.98 K/UL — SIGNIFICANT CHANGE UP (ref 1.4–6.5)
NEUTROPHILS # BLD AUTO: 6.6 K/UL — HIGH (ref 1.4–6.5)
NEUTROPHILS # BLD AUTO: 6.96 K/UL — HIGH (ref 1.4–6.5)
NEUTROPHILS # BLD AUTO: 9.27 K/UL — HIGH (ref 1.4–6.5)
NEUTROPHILS # BLD AUTO: 9.61 K/UL — HIGH (ref 1.4–6.5)
NEUTROPHILS # BLD AUTO: 9.76 K/UL — HIGH (ref 1.4–6.5)
NEUTROPHILS NFR BLD AUTO: 63.1 % — SIGNIFICANT CHANGE UP (ref 42.2–75.2)
NEUTROPHILS NFR BLD AUTO: 65.2 % — SIGNIFICANT CHANGE UP (ref 42.2–75.2)
NEUTROPHILS NFR BLD AUTO: 65.6 % — SIGNIFICANT CHANGE UP (ref 42.2–75.2)
NEUTROPHILS NFR BLD AUTO: 66.4 % — SIGNIFICANT CHANGE UP (ref 42.2–75.2)
NEUTROPHILS NFR BLD AUTO: 67.8 % — SIGNIFICANT CHANGE UP (ref 42.2–75.2)
NEUTROPHILS NFR BLD AUTO: 69.7 % — SIGNIFICANT CHANGE UP (ref 42.2–75.2)
NEUTROPHILS NFR BLD AUTO: 70 % — SIGNIFICANT CHANGE UP (ref 42.2–75.2)
NEUTROPHILS NFR BLD AUTO: 73 % — SIGNIFICANT CHANGE UP (ref 42.2–75.2)
NEUTROPHILS NFR BLD AUTO: 76.8 % — HIGH (ref 42.2–75.2)
NEUTROPHILS NFR BLD AUTO: 79.8 % — HIGH (ref 42.2–75.2)
NRBC # BLD: 0 /100 WBCS — SIGNIFICANT CHANGE UP (ref 0–0)
NT-PROBNP SERPL-SCNC: 6349 PG/ML — HIGH (ref 0–300)
ORGANISM # SPEC MICROSCOPIC CNT: SIGNIFICANT CHANGE UP
ORGANISM # SPEC MICROSCOPIC CNT: SIGNIFICANT CHANGE UP
PHOSPHATE SERPL-MCNC: 3 MG/DL — SIGNIFICANT CHANGE UP (ref 2.1–4.9)
PLATELET # BLD AUTO: 152 K/UL — SIGNIFICANT CHANGE UP (ref 130–400)
PLATELET # BLD AUTO: 153 K/UL — SIGNIFICANT CHANGE UP (ref 130–400)
PLATELET # BLD AUTO: 161 K/UL — SIGNIFICANT CHANGE UP (ref 130–400)
PLATELET # BLD AUTO: 164 K/UL — SIGNIFICANT CHANGE UP (ref 130–400)
PLATELET # BLD AUTO: 166 K/UL — SIGNIFICANT CHANGE UP (ref 130–400)
PLATELET # BLD AUTO: 168 K/UL — SIGNIFICANT CHANGE UP (ref 130–400)
PLATELET # BLD AUTO: 171 K/UL — SIGNIFICANT CHANGE UP (ref 130–400)
PLATELET # BLD AUTO: 180 K/UL — SIGNIFICANT CHANGE UP (ref 130–400)
PLATELET # BLD AUTO: 185 K/UL — SIGNIFICANT CHANGE UP (ref 130–400)
PLATELET # BLD AUTO: 186 K/UL — SIGNIFICANT CHANGE UP (ref 130–400)
PLATELET # BLD AUTO: 193 K/UL — SIGNIFICANT CHANGE UP (ref 130–400)
PLATELET # BLD AUTO: 197 K/UL — SIGNIFICANT CHANGE UP (ref 130–400)
PLATELET # BLD AUTO: 198 K/UL — SIGNIFICANT CHANGE UP (ref 130–400)
PLATELET # BLD AUTO: 202 K/UL — SIGNIFICANT CHANGE UP (ref 130–400)
PLATELET # BLD AUTO: 239 K/UL — SIGNIFICANT CHANGE UP (ref 130–400)
PLATELET # BLD AUTO: 242 K/UL — SIGNIFICANT CHANGE UP (ref 130–400)
PLATELET # BLD AUTO: 243 K/UL — SIGNIFICANT CHANGE UP (ref 130–400)
PLATELET # BLD AUTO: 250 K/UL — SIGNIFICANT CHANGE UP (ref 130–400)
PLATELET # BLD AUTO: 259 K/UL — SIGNIFICANT CHANGE UP (ref 130–400)
PLATELET # BLD AUTO: 266 K/UL — SIGNIFICANT CHANGE UP (ref 130–400)
PLATELET # BLD AUTO: 274 K/UL — SIGNIFICANT CHANGE UP (ref 130–400)
PLATELET # BLD AUTO: 277 K/UL — SIGNIFICANT CHANGE UP (ref 130–400)
PLATELET # BLD AUTO: 287 K/UL — SIGNIFICANT CHANGE UP (ref 130–400)
PLATELET # BLD AUTO: 294 K/UL — SIGNIFICANT CHANGE UP (ref 130–400)
PLATELET # BLD AUTO: 303 K/UL — SIGNIFICANT CHANGE UP (ref 130–400)
POTASSIUM SERPL-MCNC: 2.5 MMOL/L — CRITICAL LOW (ref 3.5–5)
POTASSIUM SERPL-MCNC: 3.1 MMOL/L — LOW (ref 3.5–5)
POTASSIUM SERPL-MCNC: 3.2 MMOL/L — LOW (ref 3.5–5)
POTASSIUM SERPL-MCNC: 3.5 MMOL/L — SIGNIFICANT CHANGE UP (ref 3.5–5)
POTASSIUM SERPL-MCNC: 3.7 MMOL/L — SIGNIFICANT CHANGE UP (ref 3.5–5)
POTASSIUM SERPL-MCNC: 3.8 MMOL/L — SIGNIFICANT CHANGE UP (ref 3.5–5)
POTASSIUM SERPL-MCNC: 3.8 MMOL/L — SIGNIFICANT CHANGE UP (ref 3.5–5)
POTASSIUM SERPL-MCNC: 4.3 MMOL/L — SIGNIFICANT CHANGE UP (ref 3.5–5)
POTASSIUM SERPL-MCNC: 4.5 MMOL/L — SIGNIFICANT CHANGE UP (ref 3.5–5)
POTASSIUM SERPL-MCNC: 4.6 MMOL/L — SIGNIFICANT CHANGE UP (ref 3.5–5)
POTASSIUM SERPL-MCNC: 4.7 MMOL/L — SIGNIFICANT CHANGE UP (ref 3.5–5)
POTASSIUM SERPL-MCNC: 4.8 MMOL/L — SIGNIFICANT CHANGE UP (ref 3.5–5)
POTASSIUM SERPL-MCNC: 4.9 MMOL/L — SIGNIFICANT CHANGE UP (ref 3.5–5)
POTASSIUM SERPL-MCNC: 4.9 MMOL/L — SIGNIFICANT CHANGE UP (ref 3.5–5)
POTASSIUM SERPL-MCNC: 5 MMOL/L — SIGNIFICANT CHANGE UP (ref 3.5–5)
POTASSIUM SERPL-MCNC: 5.2 MMOL/L — HIGH (ref 3.5–5)
POTASSIUM SERPL-MCNC: 5.2 MMOL/L — HIGH (ref 3.5–5)
POTASSIUM SERPL-MCNC: 5.3 MMOL/L — HIGH (ref 3.5–5)
POTASSIUM SERPL-MCNC: 5.3 MMOL/L — HIGH (ref 3.5–5)
POTASSIUM SERPL-MCNC: 5.6 MMOL/L — HIGH (ref 3.5–5)
POTASSIUM SERPL-MCNC: 5.6 MMOL/L — HIGH (ref 3.5–5)
POTASSIUM SERPL-MCNC: 5.8 MMOL/L — HIGH (ref 3.5–5)
POTASSIUM SERPL-MCNC: 5.8 MMOL/L — HIGH (ref 3.5–5)
POTASSIUM SERPL-SCNC: 2.5 MMOL/L — CRITICAL LOW (ref 3.5–5)
POTASSIUM SERPL-SCNC: 3.1 MMOL/L — LOW (ref 3.5–5)
POTASSIUM SERPL-SCNC: 3.2 MMOL/L — LOW (ref 3.5–5)
POTASSIUM SERPL-SCNC: 3.5 MMOL/L — SIGNIFICANT CHANGE UP (ref 3.5–5)
POTASSIUM SERPL-SCNC: 3.7 MMOL/L — SIGNIFICANT CHANGE UP (ref 3.5–5)
POTASSIUM SERPL-SCNC: 3.8 MMOL/L — SIGNIFICANT CHANGE UP (ref 3.5–5)
POTASSIUM SERPL-SCNC: 3.8 MMOL/L — SIGNIFICANT CHANGE UP (ref 3.5–5)
POTASSIUM SERPL-SCNC: 4.3 MMOL/L — SIGNIFICANT CHANGE UP (ref 3.5–5)
POTASSIUM SERPL-SCNC: 4.5 MMOL/L — SIGNIFICANT CHANGE UP (ref 3.5–5)
POTASSIUM SERPL-SCNC: 4.6 MMOL/L — SIGNIFICANT CHANGE UP (ref 3.5–5)
POTASSIUM SERPL-SCNC: 4.7 MMOL/L — SIGNIFICANT CHANGE UP (ref 3.5–5)
POTASSIUM SERPL-SCNC: 4.8 MMOL/L — SIGNIFICANT CHANGE UP (ref 3.5–5)
POTASSIUM SERPL-SCNC: 4.9 MMOL/L — SIGNIFICANT CHANGE UP (ref 3.5–5)
POTASSIUM SERPL-SCNC: 4.9 MMOL/L — SIGNIFICANT CHANGE UP (ref 3.5–5)
POTASSIUM SERPL-SCNC: 5 MMOL/L — SIGNIFICANT CHANGE UP (ref 3.5–5)
POTASSIUM SERPL-SCNC: 5.2 MMOL/L — HIGH (ref 3.5–5)
POTASSIUM SERPL-SCNC: 5.2 MMOL/L — HIGH (ref 3.5–5)
POTASSIUM SERPL-SCNC: 5.3 MMOL/L — HIGH (ref 3.5–5)
POTASSIUM SERPL-SCNC: 5.3 MMOL/L — HIGH (ref 3.5–5)
POTASSIUM SERPL-SCNC: 5.6 MMOL/L — HIGH (ref 3.5–5)
POTASSIUM SERPL-SCNC: 5.6 MMOL/L — HIGH (ref 3.5–5)
POTASSIUM SERPL-SCNC: 5.8 MMOL/L — HIGH (ref 3.5–5)
POTASSIUM SERPL-SCNC: 5.8 MMOL/L — HIGH (ref 3.5–5)
PROT SERPL-MCNC: 5.9 G/DL — LOW (ref 6–8)
PROT SERPL-MCNC: 6 G/DL — SIGNIFICANT CHANGE UP (ref 6–8)
PROT SERPL-MCNC: 6.2 G/DL — SIGNIFICANT CHANGE UP (ref 6–8)
PROT SERPL-MCNC: 6.3 G/DL — SIGNIFICANT CHANGE UP (ref 6–8)
PROT SERPL-MCNC: 6.5 G/DL — SIGNIFICANT CHANGE UP (ref 6–8)
PROT SERPL-MCNC: 6.5 G/DL — SIGNIFICANT CHANGE UP (ref 6–8)
PROT SERPL-MCNC: 7.1 G/DL — SIGNIFICANT CHANGE UP (ref 6–8)
PROT SERPL-MCNC: 7.2 G/DL — SIGNIFICANT CHANGE UP (ref 6–8)
PROT SERPL-MCNC: 8.2 G/DL — HIGH (ref 6–8)
PROTHROM AB SERPL-ACNC: 13.2 SEC — HIGH (ref 9.95–12.87)
PROTHROM AB SERPL-ACNC: 14.3 SEC — HIGH (ref 9.95–12.87)
PROTHROM AB SERPL-ACNC: 14.4 SEC — HIGH (ref 9.95–12.87)
PROTHROM AB SERPL-ACNC: 15.1 SEC — HIGH (ref 9.95–12.87)
PROTHROM AB SERPL-ACNC: 15.3 SEC — HIGH (ref 9.95–12.87)
PROTHROM AB SERPL-ACNC: 16.4 SEC — HIGH (ref 9.95–12.87)
RBC # BLD: 2.03 M/UL — LOW (ref 4.2–5.4)
RBC # BLD: 2.06 M/UL — LOW (ref 4.2–5.4)
RBC # BLD: 2.44 M/UL — LOW (ref 4.2–5.4)
RBC # BLD: 2.5 M/UL — LOW (ref 4.2–5.4)
RBC # BLD: 2.56 M/UL — LOW (ref 4.2–5.4)
RBC # BLD: 2.63 M/UL — LOW (ref 4.2–5.4)
RBC # BLD: 2.65 M/UL — LOW (ref 4.2–5.4)
RBC # BLD: 2.69 M/UL — LOW (ref 4.2–5.4)
RBC # BLD: 2.78 M/UL — LOW (ref 4.2–5.4)
RBC # BLD: 2.83 M/UL — LOW (ref 4.2–5.4)
RBC # BLD: 2.84 M/UL — LOW (ref 4.2–5.4)
RBC # BLD: 2.9 M/UL — LOW (ref 4.2–5.4)
RBC # BLD: 2.91 M/UL — LOW (ref 4.2–5.4)
RBC # BLD: 2.95 M/UL — LOW (ref 4.2–5.4)
RBC # BLD: 2.97 M/UL — LOW (ref 4.2–5.4)
RBC # BLD: 3.19 M/UL — LOW (ref 4.2–5.4)
RBC # BLD: 3.23 M/UL — LOW (ref 4.2–5.4)
RBC # BLD: 3.32 M/UL — LOW (ref 4.2–5.4)
RBC # BLD: 3.43 M/UL — LOW (ref 4.2–5.4)
RBC # BLD: 3.5 M/UL — LOW (ref 4.2–5.4)
RBC # BLD: 3.51 M/UL — LOW (ref 4.2–5.4)
RBC # BLD: 3.63 M/UL — LOW (ref 4.2–5.4)
RBC # BLD: 3.81 M/UL — LOW (ref 4.2–5.4)
RBC # BLD: 3.81 M/UL — LOW (ref 4.2–5.4)
RBC # BLD: 3.82 M/UL — LOW (ref 4.2–5.4)
RBC # FLD: 14 % — SIGNIFICANT CHANGE UP (ref 11.5–14.5)
RBC # FLD: 14 % — SIGNIFICANT CHANGE UP (ref 11.5–14.5)
RBC # FLD: 14.4 % — SIGNIFICANT CHANGE UP (ref 11.5–14.5)
RBC # FLD: 17.3 % — HIGH (ref 11.5–14.5)
RBC # FLD: 17.6 % — HIGH (ref 11.5–14.5)
RBC # FLD: 17.8 % — HIGH (ref 11.5–14.5)
RBC # FLD: 18 % — HIGH (ref 11.5–14.5)
RBC # FLD: 18.1 % — HIGH (ref 11.5–14.5)
RBC # FLD: 18.1 % — HIGH (ref 11.5–14.5)
RBC # FLD: 18.7 % — HIGH (ref 11.5–14.5)
RBC # FLD: 19.2 % — HIGH (ref 11.5–14.5)
RBC # FLD: 19.4 % — HIGH (ref 11.5–14.5)
RBC # FLD: 19.6 % — HIGH (ref 11.5–14.5)
RBC # FLD: 19.6 % — HIGH (ref 11.5–14.5)
RBC # FLD: 19.7 % — HIGH (ref 11.5–14.5)
RBC # FLD: 19.8 % — HIGH (ref 11.5–14.5)
RBC # FLD: 19.9 % — HIGH (ref 11.5–14.5)
RBC # FLD: 20 % — HIGH (ref 11.5–14.5)
RBC # FLD: 21.3 % — HIGH (ref 11.5–14.5)
RBC # FLD: 22.5 % — HIGH (ref 11.5–14.5)
RBC # FLD: 22.9 % — HIGH (ref 11.5–14.5)
RBC # FLD: SIGNIFICANT CHANGE UP % (ref 11.5–14.5)
RBC # FLD: SIGNIFICANT CHANGE UP % (ref 11.5–14.5)
SMOOTH MUSCLE AB SER-ACNC: ABNORMAL
SODIUM SERPL-SCNC: 127 MMOL/L — LOW (ref 135–146)
SODIUM SERPL-SCNC: 130 MMOL/L — LOW (ref 135–146)
SODIUM SERPL-SCNC: 130 MMOL/L — LOW (ref 135–146)
SODIUM SERPL-SCNC: 131 MMOL/L — LOW (ref 135–146)
SODIUM SERPL-SCNC: 131 MMOL/L — LOW (ref 135–146)
SODIUM SERPL-SCNC: 132 MMOL/L — LOW (ref 135–146)
SODIUM SERPL-SCNC: 132 MMOL/L — LOW (ref 135–146)
SODIUM SERPL-SCNC: 134 MMOL/L — LOW (ref 135–146)
SODIUM SERPL-SCNC: 136 MMOL/L — SIGNIFICANT CHANGE UP (ref 135–146)
SODIUM SERPL-SCNC: 137 MMOL/L — SIGNIFICANT CHANGE UP (ref 135–146)
SODIUM SERPL-SCNC: 137 MMOL/L — SIGNIFICANT CHANGE UP (ref 135–146)
SODIUM SERPL-SCNC: 138 MMOL/L — SIGNIFICANT CHANGE UP (ref 135–146)
SODIUM SERPL-SCNC: 140 MMOL/L — SIGNIFICANT CHANGE UP (ref 135–146)
SODIUM SERPL-SCNC: 143 MMOL/L — SIGNIFICANT CHANGE UP (ref 135–146)
SODIUM SERPL-SCNC: 145 MMOL/L — SIGNIFICANT CHANGE UP (ref 135–146)
SODIUM SERPL-SCNC: 150 MMOL/L — HIGH (ref 135–146)
SPECIMEN SOURCE: SIGNIFICANT CHANGE UP
TIBC SERPL-MCNC: 161 UG/DL — LOW (ref 220–430)
TROPONIN T SERPL-MCNC: 0.03 NG/ML — CRITICAL HIGH
TROPONIN T SERPL-MCNC: 0.03 NG/ML — CRITICAL HIGH
TROPONIN T SERPL-MCNC: 0.04 NG/ML — CRITICAL HIGH
TYPE + AB SCN PNL BLD: SIGNIFICANT CHANGE UP
UIBC SERPL-MCNC: 70 UG/DL — LOW (ref 110–370)
WBC # BLD: 10.87 K/UL — HIGH (ref 4.8–10.8)
WBC # BLD: 10.87 K/UL — HIGH (ref 4.8–10.8)
WBC # BLD: 11.02 K/UL — HIGH (ref 4.8–10.8)
WBC # BLD: 11.21 K/UL — HIGH (ref 4.8–10.8)
WBC # BLD: 11.94 K/UL — HIGH (ref 4.8–10.8)
WBC # BLD: 12.64 K/UL — HIGH (ref 4.8–10.8)
WBC # BLD: 12.8 K/UL — HIGH (ref 4.8–10.8)
WBC # BLD: 12.85 K/UL — HIGH (ref 4.8–10.8)
WBC # BLD: 13.04 K/UL — HIGH (ref 4.8–10.8)
WBC # BLD: 13.23 K/UL — HIGH (ref 4.8–10.8)
WBC # BLD: 13.24 K/UL — HIGH (ref 4.8–10.8)
WBC # BLD: 13.81 K/UL — HIGH (ref 4.8–10.8)
WBC # BLD: 14.19 K/UL — HIGH (ref 4.8–10.8)
WBC # BLD: 14.67 K/UL — HIGH (ref 4.8–10.8)
WBC # BLD: 14.71 K/UL — HIGH (ref 4.8–10.8)
WBC # BLD: 14.74 K/UL — HIGH (ref 4.8–10.8)
WBC # BLD: 15.11 K/UL — HIGH (ref 4.8–10.8)
WBC # BLD: 16.16 K/UL — HIGH (ref 4.8–10.8)
WBC # BLD: 17.22 K/UL — HIGH (ref 4.8–10.8)
WBC # BLD: 17.45 K/UL — HIGH (ref 4.8–10.8)
WBC # BLD: 20.1 K/UL — HIGH (ref 4.8–10.8)
WBC # BLD: 29.53 K/UL — HIGH (ref 4.8–10.8)
WBC # BLD: 5.97 K/UL — SIGNIFICANT CHANGE UP (ref 4.8–10.8)
WBC # BLD: 9.05 K/UL — SIGNIFICANT CHANGE UP (ref 4.8–10.8)
WBC # BLD: 9.13 K/UL — SIGNIFICANT CHANGE UP (ref 4.8–10.8)
WBC # FLD AUTO: 10.87 K/UL — HIGH (ref 4.8–10.8)
WBC # FLD AUTO: 10.87 K/UL — HIGH (ref 4.8–10.8)
WBC # FLD AUTO: 11.02 K/UL — HIGH (ref 4.8–10.8)
WBC # FLD AUTO: 11.21 K/UL — HIGH (ref 4.8–10.8)
WBC # FLD AUTO: 11.94 K/UL — HIGH (ref 4.8–10.8)
WBC # FLD AUTO: 12.64 K/UL — HIGH (ref 4.8–10.8)
WBC # FLD AUTO: 12.8 K/UL — HIGH (ref 4.8–10.8)
WBC # FLD AUTO: 12.85 K/UL — HIGH (ref 4.8–10.8)
WBC # FLD AUTO: 13.04 K/UL — HIGH (ref 4.8–10.8)
WBC # FLD AUTO: 13.23 K/UL — HIGH (ref 4.8–10.8)
WBC # FLD AUTO: 13.24 K/UL — HIGH (ref 4.8–10.8)
WBC # FLD AUTO: 13.81 K/UL — HIGH (ref 4.8–10.8)
WBC # FLD AUTO: 14.19 K/UL — HIGH (ref 4.8–10.8)
WBC # FLD AUTO: 14.67 K/UL — HIGH (ref 4.8–10.8)
WBC # FLD AUTO: 14.71 K/UL — HIGH (ref 4.8–10.8)
WBC # FLD AUTO: 14.74 K/UL — HIGH (ref 4.8–10.8)
WBC # FLD AUTO: 15.11 K/UL — HIGH (ref 4.8–10.8)
WBC # FLD AUTO: 16.16 K/UL — HIGH (ref 4.8–10.8)
WBC # FLD AUTO: 17.22 K/UL — HIGH (ref 4.8–10.8)
WBC # FLD AUTO: 17.45 K/UL — HIGH (ref 4.8–10.8)
WBC # FLD AUTO: 20.1 K/UL — HIGH (ref 4.8–10.8)
WBC # FLD AUTO: 29.53 K/UL — HIGH (ref 4.8–10.8)
WBC # FLD AUTO: 5.97 K/UL — SIGNIFICANT CHANGE UP (ref 4.8–10.8)
WBC # FLD AUTO: 9.05 K/UL — SIGNIFICANT CHANGE UP (ref 4.8–10.8)
WBC # FLD AUTO: 9.13 K/UL — SIGNIFICANT CHANGE UP (ref 4.8–10.8)

## 2019-01-01 PROCEDURE — 93970 EXTREMITY STUDY: CPT | Mod: 26

## 2019-01-01 RX ORDER — ASCORBIC ACID 60 MG
1 TABLET,CHEWABLE ORAL
Qty: 0 | Refills: 0 | COMMUNITY
Start: 2019-01-01

## 2019-01-01 RX ORDER — POTASSIUM CHLORIDE 20 MEQ
20 PACKET (EA) ORAL ONCE
Qty: 0 | Refills: 0 | Status: DISCONTINUED | OUTPATIENT
Start: 2019-01-01 | End: 2019-01-01

## 2019-01-01 RX ORDER — SENNA PLUS 8.6 MG/1
2 TABLET ORAL AT BEDTIME
Qty: 0 | Refills: 0 | Status: DISCONTINUED | OUTPATIENT
Start: 2019-01-01 | End: 2019-01-01

## 2019-01-01 RX ORDER — ACETAMINOPHEN 500 MG
650 TABLET ORAL EVERY 6 HOURS
Qty: 0 | Refills: 0 | Status: DISCONTINUED | OUTPATIENT
Start: 2019-01-01 | End: 2019-01-01

## 2019-01-01 RX ORDER — ALPRAZOLAM 0.25 MG
0.5 TABLET ORAL ONCE
Qty: 0 | Refills: 0 | Status: DISCONTINUED | OUTPATIENT
Start: 2019-01-01 | End: 2019-01-01

## 2019-01-01 RX ORDER — LATANOPROST 0.05 MG/ML
1 SOLUTION/ DROPS OPHTHALMIC; TOPICAL AT BEDTIME
Qty: 0 | Refills: 0 | Status: DISCONTINUED | OUTPATIENT
Start: 2019-01-01 | End: 2019-01-01

## 2019-01-01 RX ORDER — SODIUM CHLORIDE 9 MG/ML
1000 INJECTION, SOLUTION INTRAVENOUS
Qty: 0 | Refills: 0 | Status: DISCONTINUED | OUTPATIENT
Start: 2019-01-01 | End: 2019-01-01

## 2019-01-01 RX ORDER — LATANOPROST 0.05 MG/ML
1 SOLUTION/ DROPS OPHTHALMIC; TOPICAL
Qty: 0 | Refills: 0 | COMMUNITY
Start: 2019-01-01

## 2019-01-01 RX ORDER — MEROPENEM 1 G/30ML
500 INJECTION INTRAVENOUS ONCE
Qty: 0 | Refills: 0 | Status: DISCONTINUED | OUTPATIENT
Start: 2019-01-01 | End: 2019-01-01

## 2019-01-01 RX ORDER — MORPHINE SULFATE 50 MG/1
2 CAPSULE, EXTENDED RELEASE ORAL ONCE
Qty: 0 | Refills: 0 | Status: DISCONTINUED | OUTPATIENT
Start: 2019-01-01 | End: 2019-01-01

## 2019-01-01 RX ORDER — CEFAZOLIN SODIUM 1 G
2000 VIAL (EA) INJECTION EVERY 8 HOURS
Qty: 0 | Refills: 0 | Status: COMPLETED | OUTPATIENT
Start: 2019-01-01 | End: 2019-01-01

## 2019-01-01 RX ORDER — SODIUM HYPOCHLORITE 0.125 %
1 SOLUTION, NON-ORAL MISCELLANEOUS
Qty: 0 | Refills: 0 | Status: DISCONTINUED | OUTPATIENT
Start: 2019-01-01 | End: 2019-01-01

## 2019-01-01 RX ORDER — ONDANSETRON 8 MG/1
4 TABLET, FILM COATED ORAL ONCE
Qty: 0 | Refills: 0 | Status: DISCONTINUED | OUTPATIENT
Start: 2019-01-01 | End: 2019-01-01

## 2019-01-01 RX ORDER — DOCUSATE SODIUM 100 MG
100 CAPSULE ORAL THREE TIMES A DAY
Qty: 0 | Refills: 0 | Status: DISCONTINUED | OUTPATIENT
Start: 2019-01-01 | End: 2019-01-01

## 2019-01-01 RX ORDER — MORPHINE SULFATE 50 MG/1
10 CAPSULE, EXTENDED RELEASE ORAL EVERY 4 HOURS
Qty: 0 | Refills: 0 | Status: DISCONTINUED | OUTPATIENT
Start: 2019-01-01 | End: 2019-01-01

## 2019-01-01 RX ORDER — SCOPALAMINE 1 MG/3D
1 PATCH, EXTENDED RELEASE TRANSDERMAL
Qty: 0 | Refills: 0 | Status: DISCONTINUED | OUTPATIENT
Start: 2019-01-01 | End: 2019-01-01

## 2019-01-01 RX ORDER — SODIUM CHLORIDE 9 MG/ML
1000 INJECTION INTRAMUSCULAR; INTRAVENOUS; SUBCUTANEOUS
Qty: 0 | Refills: 0 | Status: DISCONTINUED | OUTPATIENT
Start: 2019-01-01 | End: 2019-01-01

## 2019-01-01 RX ORDER — ROBINUL 0.2 MG/ML
0.4 INJECTION INTRAMUSCULAR; INTRAVENOUS ONCE
Qty: 0 | Refills: 0 | Status: COMPLETED | OUTPATIENT
Start: 2019-01-01 | End: 2019-01-01

## 2019-01-01 RX ORDER — MEROPENEM 1 G/30ML
INJECTION INTRAVENOUS
Qty: 0 | Refills: 0 | Status: DISCONTINUED | OUTPATIENT
Start: 2019-01-01 | End: 2019-01-01

## 2019-01-01 RX ORDER — SODIUM CHLORIDE 9 MG/ML
250 INJECTION INTRAMUSCULAR; INTRAVENOUS; SUBCUTANEOUS ONCE
Qty: 0 | Refills: 0 | Status: COMPLETED | OUTPATIENT
Start: 2019-01-01 | End: 2019-01-01

## 2019-01-01 RX ORDER — FUROSEMIDE 40 MG
40 TABLET ORAL ONCE
Qty: 0 | Refills: 0 | Status: COMPLETED | OUTPATIENT
Start: 2019-01-01 | End: 2019-01-01

## 2019-01-01 RX ORDER — COLLAGENASE CLOSTRIDIUM HIST. 250 UNIT/G
1 OINTMENT (GRAM) TOPICAL
Qty: 0 | Refills: 0 | Status: DISCONTINUED | OUTPATIENT
Start: 2019-01-01 | End: 2019-01-01

## 2019-01-01 RX ORDER — HEPARIN SODIUM 5000 [USP'U]/ML
4000 INJECTION INTRAVENOUS; SUBCUTANEOUS EVERY 6 HOURS
Qty: 0 | Refills: 0 | Status: DISCONTINUED | OUTPATIENT
Start: 2019-01-01 | End: 2019-01-01

## 2019-01-01 RX ORDER — HEPARIN SODIUM 5000 [USP'U]/ML
INJECTION INTRAVENOUS; SUBCUTANEOUS
Qty: 25000 | Refills: 0 | Status: DISCONTINUED | OUTPATIENT
Start: 2019-01-01 | End: 2019-01-01

## 2019-01-01 RX ORDER — POLYETHYLENE GLYCOL 3350 17 G/17G
17 POWDER, FOR SOLUTION ORAL
Qty: 0 | Refills: 0 | Status: DISCONTINUED | OUTPATIENT
Start: 2019-01-01 | End: 2019-01-01

## 2019-01-01 RX ORDER — MORPHINE SULFATE 50 MG/1
5 CAPSULE, EXTENDED RELEASE ORAL EVERY 4 HOURS
Qty: 0 | Refills: 0 | Status: DISCONTINUED | OUTPATIENT
Start: 2019-01-01 | End: 2019-01-01

## 2019-01-01 RX ORDER — ENOXAPARIN SODIUM 100 MG/ML
40 INJECTION SUBCUTANEOUS EVERY 24 HOURS
Qty: 0 | Refills: 0 | Status: DISCONTINUED | OUTPATIENT
Start: 2019-01-01 | End: 2019-01-01

## 2019-01-01 RX ORDER — SODIUM HYPOCHLORITE 0.125 %
1 SOLUTION, NON-ORAL MISCELLANEOUS DAILY
Qty: 0 | Refills: 0 | Status: DISCONTINUED | OUTPATIENT
Start: 2019-01-01 | End: 2019-01-01

## 2019-01-01 RX ORDER — FOLIC ACID 0.8 MG
1 TABLET ORAL
Qty: 0 | Refills: 0 | COMMUNITY
Start: 2019-01-01

## 2019-01-01 RX ORDER — ONDANSETRON 8 MG/1
4 TABLET, FILM COATED ORAL EVERY 6 HOURS
Qty: 0 | Refills: 0 | Status: DISCONTINUED | OUTPATIENT
Start: 2019-01-01 | End: 2019-01-01

## 2019-01-01 RX ORDER — HYDROCORTISONE 1 %
1 OINTMENT (GRAM) TOPICAL AT BEDTIME
Qty: 0 | Refills: 0 | Status: DISCONTINUED | OUTPATIENT
Start: 2019-01-01 | End: 2019-01-01

## 2019-01-01 RX ORDER — HEPARIN SODIUM 5000 [USP'U]/ML
800 INJECTION INTRAVENOUS; SUBCUTANEOUS
Qty: 25000 | Refills: 0 | Status: DISCONTINUED | OUTPATIENT
Start: 2019-01-01 | End: 2019-01-01

## 2019-01-01 RX ORDER — MORPHINE SULFATE 50 MG/1
5 CAPSULE, EXTENDED RELEASE ORAL
Qty: 0 | Refills: 0 | Status: DISCONTINUED | OUTPATIENT
Start: 2019-01-01 | End: 2019-01-01

## 2019-01-01 RX ORDER — POTASSIUM CHLORIDE 20 MEQ
40 PACKET (EA) ORAL ONCE
Qty: 0 | Refills: 0 | Status: COMPLETED | OUTPATIENT
Start: 2019-01-01 | End: 2019-01-01

## 2019-01-01 RX ORDER — POLYETHYLENE GLYCOL 3350 17 G/17G
17 POWDER, FOR SOLUTION ORAL DAILY
Qty: 0 | Refills: 0 | Status: DISCONTINUED | OUTPATIENT
Start: 2019-01-01 | End: 2019-01-01

## 2019-01-01 RX ORDER — FERROUS SULFATE 325(65) MG
325 TABLET ORAL DAILY
Qty: 0 | Refills: 0 | Status: DISCONTINUED | OUTPATIENT
Start: 2019-01-01 | End: 2019-01-01

## 2019-01-01 RX ORDER — HEPARIN SODIUM 5000 [USP'U]/ML
2000 INJECTION INTRAVENOUS; SUBCUTANEOUS EVERY 6 HOURS
Qty: 0 | Refills: 0 | Status: DISCONTINUED | OUTPATIENT
Start: 2019-01-01 | End: 2019-01-01

## 2019-01-01 RX ORDER — POTASSIUM CHLORIDE 20 MEQ
20 PACKET (EA) ORAL ONCE
Qty: 0 | Refills: 0 | Status: COMPLETED | OUTPATIENT
Start: 2019-01-01 | End: 2019-01-01

## 2019-01-01 RX ORDER — CHLORHEXIDINE GLUCONATE 213 G/1000ML
1 SOLUTION TOPICAL
Qty: 0 | Refills: 0 | Status: DISCONTINUED | OUTPATIENT
Start: 2019-01-01 | End: 2019-01-01

## 2019-01-01 RX ORDER — ACETAMINOPHEN 500 MG
1000 TABLET ORAL ONCE
Qty: 0 | Refills: 0 | Status: COMPLETED | OUTPATIENT
Start: 2019-01-01 | End: 2019-01-01

## 2019-01-01 RX ORDER — MAGNESIUM HYDROXIDE 400 MG/1
30 TABLET, CHEWABLE ORAL DAILY
Qty: 0 | Refills: 0 | Status: DISCONTINUED | OUTPATIENT
Start: 2019-01-01 | End: 2019-01-01

## 2019-01-01 RX ORDER — OXYCODONE HYDROCHLORIDE 5 MG/1
5 TABLET ORAL EVERY 8 HOURS
Qty: 0 | Refills: 0 | Status: DISCONTINUED | OUTPATIENT
Start: 2019-01-01 | End: 2019-01-01

## 2019-01-01 RX ORDER — SOD SULF/SODIUM/NAHCO3/KCL/PEG
2000 SOLUTION, RECONSTITUTED, ORAL ORAL ONCE
Qty: 0 | Refills: 0 | Status: COMPLETED | OUTPATIENT
Start: 2019-01-01 | End: 2019-01-01

## 2019-01-01 RX ORDER — MORPHINE SULFATE 50 MG/1
2 CAPSULE, EXTENDED RELEASE ORAL
Qty: 0 | Refills: 0 | Status: DISCONTINUED | OUTPATIENT
Start: 2019-01-01 | End: 2019-01-01

## 2019-01-01 RX ORDER — MEROPENEM 1 G/30ML
500 INJECTION INTRAVENOUS EVERY 12 HOURS
Qty: 0 | Refills: 0 | Status: DISCONTINUED | OUTPATIENT
Start: 2019-01-01 | End: 2019-01-01

## 2019-01-01 RX ORDER — METOPROLOL TARTRATE 50 MG
2.5 TABLET ORAL ONCE
Qty: 0 | Refills: 0 | Status: COMPLETED | OUTPATIENT
Start: 2019-01-01 | End: 2019-01-01

## 2019-01-01 RX ORDER — CEFEPIME 1 G/1
2000 INJECTION, POWDER, FOR SOLUTION INTRAMUSCULAR; INTRAVENOUS ONCE
Qty: 0 | Refills: 0 | Status: COMPLETED | OUTPATIENT
Start: 2019-01-01 | End: 2019-01-01

## 2019-01-01 RX ORDER — FUROSEMIDE 40 MG
40 TABLET ORAL DAILY
Qty: 0 | Refills: 0 | Status: DISCONTINUED | OUTPATIENT
Start: 2019-01-01 | End: 2019-01-01

## 2019-01-01 RX ORDER — HEPARIN SODIUM 5000 [USP'U]/ML
4000 INJECTION INTRAVENOUS; SUBCUTANEOUS ONCE
Qty: 0 | Refills: 0 | Status: COMPLETED | OUTPATIENT
Start: 2019-01-01 | End: 2019-01-01

## 2019-01-01 RX ORDER — SENNA PLUS 8.6 MG/1
2 TABLET ORAL
Qty: 0 | Refills: 0 | COMMUNITY
Start: 2019-01-01

## 2019-01-01 RX ORDER — ASPIRIN/CALCIUM CARB/MAGNESIUM 324 MG
1 TABLET ORAL
Qty: 0 | Refills: 0 | COMMUNITY
Start: 2019-01-01

## 2019-01-01 RX ORDER — HEPARIN SODIUM 5000 [USP'U]/ML
600 INJECTION INTRAVENOUS; SUBCUTANEOUS
Qty: 25000 | Refills: 0 | Status: DISCONTINUED | OUTPATIENT
Start: 2019-01-01 | End: 2019-01-01

## 2019-01-01 RX ORDER — ASPIRIN/CALCIUM CARB/MAGNESIUM 324 MG
325 TABLET ORAL DAILY
Qty: 0 | Refills: 0 | Status: DISCONTINUED | OUTPATIENT
Start: 2019-01-01 | End: 2019-01-01

## 2019-01-01 RX ORDER — MORPHINE SULFATE 50 MG/1
4 CAPSULE, EXTENDED RELEASE ORAL
Qty: 0 | Refills: 0 | Status: DISCONTINUED | OUTPATIENT
Start: 2019-01-01 | End: 2019-01-01

## 2019-01-01 RX ORDER — AZITHROMYCIN 500 MG/1
500 TABLET, FILM COATED ORAL ONCE
Qty: 0 | Refills: 0 | Status: COMPLETED | OUTPATIENT
Start: 2019-01-01 | End: 2019-01-01

## 2019-01-01 RX ORDER — SODIUM CHLORIDE 9 MG/ML
500 INJECTION, SOLUTION INTRAVENOUS
Qty: 0 | Refills: 0 | Status: DISCONTINUED | OUTPATIENT
Start: 2019-01-01 | End: 2019-01-01

## 2019-01-01 RX ORDER — FOLIC ACID 0.8 MG
1 TABLET ORAL DAILY
Qty: 0 | Refills: 0 | Status: DISCONTINUED | OUTPATIENT
Start: 2019-01-01 | End: 2019-01-01

## 2019-01-01 RX ORDER — ASCORBIC ACID 60 MG
500 TABLET,CHEWABLE ORAL DAILY
Qty: 0 | Refills: 0 | Status: DISCONTINUED | OUTPATIENT
Start: 2019-01-01 | End: 2019-01-01

## 2019-01-01 RX ORDER — CEFEPIME 1 G/1
1000 INJECTION, POWDER, FOR SOLUTION INTRAMUSCULAR; INTRAVENOUS EVERY 12 HOURS
Qty: 0 | Refills: 0 | Status: DISCONTINUED | OUTPATIENT
Start: 2019-01-01 | End: 2019-01-01

## 2019-01-01 RX ORDER — SODIUM CHLORIDE 9 MG/ML
250 INJECTION INTRAMUSCULAR; INTRAVENOUS; SUBCUTANEOUS ONCE
Qty: 0 | Refills: 0 | Status: DISCONTINUED | OUTPATIENT
Start: 2019-01-01 | End: 2019-01-01

## 2019-01-01 RX ORDER — CHLORHEXIDINE GLUCONATE 213 G/1000ML
1 SOLUTION TOPICAL ONCE
Qty: 0 | Refills: 0 | Status: DISCONTINUED | OUTPATIENT
Start: 2019-01-01 | End: 2019-01-01

## 2019-01-01 RX ORDER — FERROUS SULFATE 325(65) MG
1 TABLET ORAL
Qty: 0 | Refills: 0 | COMMUNITY
Start: 2019-01-01

## 2019-01-01 RX ORDER — FUROSEMIDE 40 MG
20 TABLET ORAL
Qty: 0 | Refills: 0 | Status: DISCONTINUED | OUTPATIENT
Start: 2019-01-01 | End: 2019-01-01

## 2019-01-01 RX ORDER — ACETAMINOPHEN 500 MG
650 TABLET ORAL EVERY 6 HOURS
Qty: 0 | Refills: 0 | Status: COMPLETED | OUTPATIENT
Start: 2019-01-01 | End: 2019-01-01

## 2019-01-01 RX ORDER — FERROUS SULFATE 325(65) MG
325 TABLET ORAL
Qty: 0 | Refills: 0 | Status: DISCONTINUED | OUTPATIENT
Start: 2019-01-01 | End: 2019-01-01

## 2019-01-01 RX ORDER — HEPARIN SODIUM 5000 [USP'U]/ML
700 INJECTION INTRAVENOUS; SUBCUTANEOUS
Qty: 25000 | Refills: 0 | Status: DISCONTINUED | OUTPATIENT
Start: 2019-01-01 | End: 2019-01-01

## 2019-01-01 RX ORDER — OXYCODONE HYDROCHLORIDE 5 MG/1
5 TABLET ORAL EVERY 4 HOURS
Qty: 0 | Refills: 0 | Status: DISCONTINUED | OUTPATIENT
Start: 2019-01-01 | End: 2019-01-01

## 2019-01-01 RX ORDER — HEPARIN SODIUM 5000 [USP'U]/ML
900 INJECTION INTRAVENOUS; SUBCUTANEOUS
Qty: 25000 | Refills: 0 | Status: DISCONTINUED | OUTPATIENT
Start: 2019-01-01 | End: 2019-01-01

## 2019-01-01 RX ORDER — ASPIRIN/CALCIUM CARB/MAGNESIUM 324 MG
325 TABLET ORAL
Qty: 0 | Refills: 0 | Status: DISCONTINUED | OUTPATIENT
Start: 2019-01-01 | End: 2019-01-01

## 2019-01-01 RX ORDER — DOCUSATE SODIUM 100 MG
1 CAPSULE ORAL
Qty: 0 | Refills: 0 | COMMUNITY
Start: 2019-01-01

## 2019-01-01 RX ORDER — AMLODIPINE BESYLATE 2.5 MG/1
1 TABLET ORAL
Qty: 0 | Refills: 0 | COMMUNITY

## 2019-01-01 RX ORDER — ASCORBIC ACID 60 MG
500 TABLET,CHEWABLE ORAL
Qty: 0 | Refills: 0 | Status: DISCONTINUED | OUTPATIENT
Start: 2019-01-01 | End: 2019-01-01

## 2019-01-01 RX ORDER — METOPROLOL TARTRATE 50 MG
25 TABLET ORAL EVERY 8 HOURS
Qty: 0 | Refills: 0 | Status: DISCONTINUED | OUTPATIENT
Start: 2019-01-01 | End: 2019-01-01

## 2019-01-01 RX ORDER — MORPHINE SULFATE 50 MG/1
2 CAPSULE, EXTENDED RELEASE ORAL
Qty: 0 | Refills: 0 | Status: ON HOLD | OUTPATIENT
Start: 2019-01-01

## 2019-01-01 RX ORDER — INFLUENZA VIRUS VACCINE 15; 15; 15; 15 UG/.5ML; UG/.5ML; UG/.5ML; UG/.5ML
0.5 SUSPENSION INTRAMUSCULAR ONCE
Qty: 0 | Refills: 0 | Status: COMPLETED | OUTPATIENT
Start: 2019-01-01 | End: 2019-01-01

## 2019-01-01 RX ADMIN — MORPHINE SULFATE 10 MILLIGRAM(S): 50 CAPSULE, EXTENDED RELEASE ORAL at 18:13

## 2019-01-01 RX ADMIN — Medication 325 MILLIGRAM(S): at 08:27

## 2019-01-01 RX ADMIN — HEPARIN SODIUM 800 UNIT(S)/HR: 5000 INJECTION INTRAVENOUS; SUBCUTANEOUS at 15:11

## 2019-01-01 RX ADMIN — CEFEPIME 100 MILLIGRAM(S): 1 INJECTION, POWDER, FOR SOLUTION INTRAMUSCULAR; INTRAVENOUS at 10:35

## 2019-01-01 RX ADMIN — Medication 25 MILLIGRAM(S): at 15:54

## 2019-01-01 RX ADMIN — Medication 325 MILLIGRAM(S): at 12:05

## 2019-01-01 RX ADMIN — SCOPALAMINE 1 PATCH: 1 PATCH, EXTENDED RELEASE TRANSDERMAL at 15:15

## 2019-01-01 RX ADMIN — Medication 100 MILLIGRAM(S): at 21:27

## 2019-01-01 RX ADMIN — Medication 1 APPLICATION(S): at 17:48

## 2019-01-01 RX ADMIN — Medication 325 MILLIGRAM(S): at 08:07

## 2019-01-01 RX ADMIN — Medication 500 MILLIGRAM(S): at 12:10

## 2019-01-01 RX ADMIN — Medication 325 MILLIGRAM(S): at 12:10

## 2019-01-01 RX ADMIN — Medication 1 SUPPOSITORY(S): at 22:19

## 2019-01-01 RX ADMIN — CEFEPIME 100 MILLIGRAM(S): 1 INJECTION, POWDER, FOR SOLUTION INTRAMUSCULAR; INTRAVENOUS at 05:43

## 2019-01-01 RX ADMIN — Medication 650 MILLIGRAM(S): at 12:26

## 2019-01-01 RX ADMIN — ENOXAPARIN SODIUM 40 MILLIGRAM(S): 100 INJECTION SUBCUTANEOUS at 05:52

## 2019-01-01 RX ADMIN — POLYETHYLENE GLYCOL 3350 17 GRAM(S): 17 POWDER, FOR SOLUTION ORAL at 11:09

## 2019-01-01 RX ADMIN — Medication 100 MILLIGRAM(S): at 22:33

## 2019-01-01 RX ADMIN — CHLORHEXIDINE GLUCONATE 1 APPLICATION(S): 213 SOLUTION TOPICAL at 05:08

## 2019-01-01 RX ADMIN — MORPHINE SULFATE 5 MILLIGRAM(S): 50 CAPSULE, EXTENDED RELEASE ORAL at 09:07

## 2019-01-01 RX ADMIN — POLYETHYLENE GLYCOL 3350 17 GRAM(S): 17 POWDER, FOR SOLUTION ORAL at 12:03

## 2019-01-01 RX ADMIN — Medication 1 TABLET(S): at 12:06

## 2019-01-01 RX ADMIN — CHLORHEXIDINE GLUCONATE 1 APPLICATION(S): 213 SOLUTION TOPICAL at 06:33

## 2019-01-01 RX ADMIN — Medication 1 MILLIGRAM(S): at 12:23

## 2019-01-01 RX ADMIN — MORPHINE SULFATE 10 MILLIGRAM(S): 50 CAPSULE, EXTENDED RELEASE ORAL at 22:00

## 2019-01-01 RX ADMIN — Medication 1 TABLET(S): at 11:26

## 2019-01-01 RX ADMIN — CEFEPIME 100 MILLIGRAM(S): 1 INJECTION, POWDER, FOR SOLUTION INTRAMUSCULAR; INTRAVENOUS at 17:02

## 2019-01-01 RX ADMIN — MORPHINE SULFATE 10 MILLIGRAM(S): 50 CAPSULE, EXTENDED RELEASE ORAL at 05:35

## 2019-01-01 RX ADMIN — Medication 325 MILLIGRAM(S): at 17:57

## 2019-01-01 RX ADMIN — Medication 325 MILLIGRAM(S): at 18:01

## 2019-01-01 RX ADMIN — Medication 1 TABLET(S): at 21:27

## 2019-01-01 RX ADMIN — Medication 650 MILLIGRAM(S): at 23:40

## 2019-01-01 RX ADMIN — Medication 100 MILLIGRAM(S): at 22:31

## 2019-01-01 RX ADMIN — Medication 325 MILLIGRAM(S): at 06:03

## 2019-01-01 RX ADMIN — Medication 325 MILLIGRAM(S): at 18:06

## 2019-01-01 RX ADMIN — LATANOPROST 1 DROP(S): 0.05 SOLUTION/ DROPS OPHTHALMIC; TOPICAL at 21:33

## 2019-01-01 RX ADMIN — MORPHINE SULFATE 10 MILLIGRAM(S): 50 CAPSULE, EXTENDED RELEASE ORAL at 01:18

## 2019-01-01 RX ADMIN — Medication 1 TABLET(S): at 11:08

## 2019-01-01 RX ADMIN — Medication 100 MILLIGRAM(S): at 14:11

## 2019-01-01 RX ADMIN — Medication 325 MILLIGRAM(S): at 17:43

## 2019-01-01 RX ADMIN — Medication 650 MILLIGRAM(S): at 01:18

## 2019-01-01 RX ADMIN — Medication 100 MILLIGRAM(S): at 06:30

## 2019-01-01 RX ADMIN — POLYETHYLENE GLYCOL 3350 17 GRAM(S): 17 POWDER, FOR SOLUTION ORAL at 11:04

## 2019-01-01 RX ADMIN — CHLORHEXIDINE GLUCONATE 1 APPLICATION(S): 213 SOLUTION TOPICAL at 05:42

## 2019-01-01 RX ADMIN — Medication 1 TABLET(S): at 06:36

## 2019-01-01 RX ADMIN — Medication 1 TABLET(S): at 14:14

## 2019-01-01 RX ADMIN — Medication 100 MILLIGRAM(S): at 22:30

## 2019-01-01 RX ADMIN — MORPHINE SULFATE 10 MILLIGRAM(S): 50 CAPSULE, EXTENDED RELEASE ORAL at 01:48

## 2019-01-01 RX ADMIN — Medication 20 MILLIGRAM(S): at 21:40

## 2019-01-01 RX ADMIN — Medication 1 TABLET(S): at 13:53

## 2019-01-01 RX ADMIN — Medication 1 APPLICATION(S): at 06:47

## 2019-01-01 RX ADMIN — Medication 1 TABLET(S): at 21:38

## 2019-01-01 RX ADMIN — Medication 325 MILLIGRAM(S): at 12:23

## 2019-01-01 RX ADMIN — Medication 1 APPLICATION(S): at 06:34

## 2019-01-01 RX ADMIN — Medication 325 MILLIGRAM(S): at 06:30

## 2019-01-01 RX ADMIN — Medication 650 MILLIGRAM(S): at 07:30

## 2019-01-01 RX ADMIN — Medication 1 TABLET(S): at 21:50

## 2019-01-01 RX ADMIN — CHLORHEXIDINE GLUCONATE 1 APPLICATION(S): 213 SOLUTION TOPICAL at 06:47

## 2019-01-01 RX ADMIN — Medication 325 MILLIGRAM(S): at 05:18

## 2019-01-01 RX ADMIN — Medication 100 MILLIGRAM(S): at 15:00

## 2019-01-01 RX ADMIN — Medication 325 MILLIGRAM(S): at 13:56

## 2019-01-01 RX ADMIN — Medication 1 APPLICATION(S): at 18:55

## 2019-01-01 RX ADMIN — SODIUM CHLORIDE 100 MILLILITER(S): 9 INJECTION INTRAMUSCULAR; INTRAVENOUS; SUBCUTANEOUS at 11:25

## 2019-01-01 RX ADMIN — Medication 100 MILLIGRAM(S): at 22:38

## 2019-01-01 RX ADMIN — SODIUM CHLORIDE 50 MILLILITER(S): 9 INJECTION INTRAMUSCULAR; INTRAVENOUS; SUBCUTANEOUS at 17:47

## 2019-01-01 RX ADMIN — Medication 1 TABLET(S): at 22:33

## 2019-01-01 RX ADMIN — Medication 325 MILLIGRAM(S): at 12:46

## 2019-01-01 RX ADMIN — LATANOPROST 1 DROP(S): 0.05 SOLUTION/ DROPS OPHTHALMIC; TOPICAL at 02:31

## 2019-01-01 RX ADMIN — MORPHINE SULFATE 10 MILLIGRAM(S): 50 CAPSULE, EXTENDED RELEASE ORAL at 17:48

## 2019-01-01 RX ADMIN — Medication 40 MILLIEQUIVALENT(S): at 12:10

## 2019-01-01 RX ADMIN — Medication 50 MILLIEQUIVALENT(S): at 14:41

## 2019-01-01 RX ADMIN — Medication 100 MILLIGRAM(S): at 21:51

## 2019-01-01 RX ADMIN — MORPHINE SULFATE 5 MILLIGRAM(S): 50 CAPSULE, EXTENDED RELEASE ORAL at 12:31

## 2019-01-01 RX ADMIN — LATANOPROST 1 DROP(S): 0.05 SOLUTION/ DROPS OPHTHALMIC; TOPICAL at 21:27

## 2019-01-01 RX ADMIN — LATANOPROST 1 DROP(S): 0.05 SOLUTION/ DROPS OPHTHALMIC; TOPICAL at 21:40

## 2019-01-01 RX ADMIN — SODIUM CHLORIDE 40 MILLILITER(S): 9 INJECTION INTRAMUSCULAR; INTRAVENOUS; SUBCUTANEOUS at 21:47

## 2019-01-01 RX ADMIN — Medication 650 MILLIGRAM(S): at 18:01

## 2019-01-01 RX ADMIN — HEPARIN SODIUM 7 UNIT(S)/HR: 5000 INJECTION INTRAVENOUS; SUBCUTANEOUS at 23:40

## 2019-01-01 RX ADMIN — Medication 100 MILLIGRAM(S): at 05:42

## 2019-01-01 RX ADMIN — MORPHINE SULFATE 10 MILLIGRAM(S): 50 CAPSULE, EXTENDED RELEASE ORAL at 21:05

## 2019-01-01 RX ADMIN — Medication 1 TABLET(S): at 13:57

## 2019-01-01 RX ADMIN — Medication 1 TABLET(S): at 21:33

## 2019-01-01 RX ADMIN — CHLORHEXIDINE GLUCONATE 1 APPLICATION(S): 213 SOLUTION TOPICAL at 05:11

## 2019-01-01 RX ADMIN — Medication 1 APPLICATION(S): at 06:29

## 2019-01-01 RX ADMIN — Medication 1 TABLET(S): at 05:01

## 2019-01-01 RX ADMIN — Medication 25 MILLIGRAM(S): at 21:57

## 2019-01-01 RX ADMIN — SCOPALAMINE 1 PATCH: 1 PATCH, EXTENDED RELEASE TRANSDERMAL at 17:48

## 2019-01-01 RX ADMIN — HEPARIN SODIUM 6 UNIT(S)/HR: 5000 INJECTION INTRAVENOUS; SUBCUTANEOUS at 02:04

## 2019-01-01 RX ADMIN — Medication 325 MILLIGRAM(S): at 18:10

## 2019-01-01 RX ADMIN — POLYETHYLENE GLYCOL 3350 17 GRAM(S): 17 POWDER, FOR SOLUTION ORAL at 06:37

## 2019-01-01 RX ADMIN — Medication 1 TABLET(S): at 21:19

## 2019-01-01 RX ADMIN — CHLORHEXIDINE GLUCONATE 1 APPLICATION(S): 213 SOLUTION TOPICAL at 07:01

## 2019-01-01 RX ADMIN — Medication 500 MILLIGRAM(S): at 17:34

## 2019-01-01 RX ADMIN — Medication 1 MILLIGRAM(S): at 11:04

## 2019-01-01 RX ADMIN — Medication 325 MILLIGRAM(S): at 11:24

## 2019-01-01 RX ADMIN — CHLORHEXIDINE GLUCONATE 1 APPLICATION(S): 213 SOLUTION TOPICAL at 06:30

## 2019-01-01 RX ADMIN — Medication 25 MILLIGRAM(S): at 05:42

## 2019-01-01 RX ADMIN — Medication 1 TABLET(S): at 12:46

## 2019-01-01 RX ADMIN — POLYETHYLENE GLYCOL 3350 17 GRAM(S): 17 POWDER, FOR SOLUTION ORAL at 11:24

## 2019-01-01 RX ADMIN — Medication 100 MILLIGRAM(S): at 05:24

## 2019-01-01 RX ADMIN — Medication 650 MILLIGRAM(S): at 05:18

## 2019-01-01 RX ADMIN — Medication 1 APPLICATION(S): at 06:24

## 2019-01-01 RX ADMIN — Medication 650 MILLIGRAM(S): at 12:06

## 2019-01-01 RX ADMIN — Medication 1 TABLET(S): at 14:35

## 2019-01-01 RX ADMIN — Medication 1 TABLET(S): at 22:38

## 2019-01-01 RX ADMIN — MORPHINE SULFATE 10 MILLIGRAM(S): 50 CAPSULE, EXTENDED RELEASE ORAL at 21:44

## 2019-01-01 RX ADMIN — Medication 100 MILLIGRAM(S): at 14:14

## 2019-01-01 RX ADMIN — LATANOPROST 1 DROP(S): 0.05 SOLUTION/ DROPS OPHTHALMIC; TOPICAL at 21:58

## 2019-01-01 RX ADMIN — Medication 325 MILLIGRAM(S): at 17:48

## 2019-01-01 RX ADMIN — MORPHINE SULFATE 10 MILLIGRAM(S): 50 CAPSULE, EXTENDED RELEASE ORAL at 06:24

## 2019-01-01 RX ADMIN — Medication 100 MILLIGRAM(S): at 21:56

## 2019-01-01 RX ADMIN — Medication 500 MILLIGRAM(S): at 05:18

## 2019-01-01 RX ADMIN — LATANOPROST 1 DROP(S): 0.05 SOLUTION/ DROPS OPHTHALMIC; TOPICAL at 21:18

## 2019-01-01 RX ADMIN — Medication 100 MILLIGRAM(S): at 14:35

## 2019-01-01 RX ADMIN — MORPHINE SULFATE 10 MILLIGRAM(S): 50 CAPSULE, EXTENDED RELEASE ORAL at 10:14

## 2019-01-01 RX ADMIN — SCOPALAMINE 1 PATCH: 1 PATCH, EXTENDED RELEASE TRANSDERMAL at 19:05

## 2019-01-01 RX ADMIN — MORPHINE SULFATE 10 MILLIGRAM(S): 50 CAPSULE, EXTENDED RELEASE ORAL at 15:55

## 2019-01-01 RX ADMIN — Medication 500 MILLIGRAM(S): at 18:06

## 2019-01-01 RX ADMIN — Medication 500 MILLIGRAM(S): at 17:57

## 2019-01-01 RX ADMIN — Medication 100 MILLIGRAM(S): at 21:41

## 2019-01-01 RX ADMIN — Medication 1 TABLET(S): at 11:23

## 2019-01-01 RX ADMIN — Medication 100 MILLIGRAM(S): at 13:23

## 2019-01-01 RX ADMIN — Medication 1 TABLET(S): at 13:34

## 2019-01-01 RX ADMIN — Medication 1000 MILLIGRAM(S): at 02:35

## 2019-01-01 RX ADMIN — Medication 100 MILLIGRAM(S): at 13:33

## 2019-01-01 RX ADMIN — Medication 1 TABLET(S): at 05:11

## 2019-01-01 RX ADMIN — Medication 650 MILLIGRAM(S): at 05:36

## 2019-01-01 RX ADMIN — Medication 1 MILLIGRAM(S): at 11:26

## 2019-01-01 RX ADMIN — Medication 2.5 MILLIGRAM(S): at 10:35

## 2019-01-01 RX ADMIN — Medication 650 MILLIGRAM(S): at 05:24

## 2019-01-01 RX ADMIN — Medication 1 MILLIGRAM(S): at 11:09

## 2019-01-01 RX ADMIN — Medication 500 MILLIGRAM(S): at 05:15

## 2019-01-01 RX ADMIN — MORPHINE SULFATE 10 MILLIGRAM(S): 50 CAPSULE, EXTENDED RELEASE ORAL at 14:01

## 2019-01-01 RX ADMIN — Medication 500 MILLIGRAM(S): at 12:46

## 2019-01-01 RX ADMIN — MORPHINE SULFATE 5 MILLIGRAM(S): 50 CAPSULE, EXTENDED RELEASE ORAL at 13:29

## 2019-01-01 RX ADMIN — Medication 650 MILLIGRAM(S): at 17:43

## 2019-01-01 RX ADMIN — ENOXAPARIN SODIUM 40 MILLIGRAM(S): 100 INJECTION SUBCUTANEOUS at 05:44

## 2019-01-01 RX ADMIN — Medication 1 TABLET(S): at 15:00

## 2019-01-01 RX ADMIN — Medication 500 MILLIGRAM(S): at 05:01

## 2019-01-01 RX ADMIN — Medication 325 MILLIGRAM(S): at 08:35

## 2019-01-01 RX ADMIN — Medication 325 MILLIGRAM(S): at 17:44

## 2019-01-01 RX ADMIN — Medication 650 MILLIGRAM(S): at 05:52

## 2019-01-01 RX ADMIN — Medication 500 MILLIGRAM(S): at 06:03

## 2019-01-01 RX ADMIN — Medication 325 MILLIGRAM(S): at 12:01

## 2019-01-01 RX ADMIN — Medication 1 MILLIGRAM(S): at 12:05

## 2019-01-01 RX ADMIN — Medication 100 MILLIGRAM(S): at 21:38

## 2019-01-01 RX ADMIN — Medication 1 TABLET(S): at 14:30

## 2019-01-01 RX ADMIN — Medication 100 MILLIGRAM(S): at 06:36

## 2019-01-01 RX ADMIN — Medication 1 TABLET(S): at 05:15

## 2019-01-01 RX ADMIN — SCOPALAMINE 1 PATCH: 1 PATCH, EXTENDED RELEASE TRANSDERMAL at 05:46

## 2019-01-01 RX ADMIN — CEFEPIME 100 MILLIGRAM(S): 1 INJECTION, POWDER, FOR SOLUTION INTRAMUSCULAR; INTRAVENOUS at 18:14

## 2019-01-01 RX ADMIN — Medication 40 MILLIEQUIVALENT(S): at 14:37

## 2019-01-01 RX ADMIN — Medication 1 TABLET(S): at 05:34

## 2019-01-01 RX ADMIN — Medication 325 MILLIGRAM(S): at 08:30

## 2019-01-01 RX ADMIN — CHLORHEXIDINE GLUCONATE 1 APPLICATION(S): 213 SOLUTION TOPICAL at 06:37

## 2019-01-01 RX ADMIN — Medication 100 MILLIGRAM(S): at 21:19

## 2019-01-01 RX ADMIN — CHLORHEXIDINE GLUCONATE 1 APPLICATION(S): 213 SOLUTION TOPICAL at 06:24

## 2019-01-01 RX ADMIN — Medication 650 MILLIGRAM(S): at 18:10

## 2019-01-01 RX ADMIN — Medication 325 MILLIGRAM(S): at 11:08

## 2019-01-01 RX ADMIN — CEFEPIME 100 MILLIGRAM(S): 1 INJECTION, POWDER, FOR SOLUTION INTRAMUSCULAR; INTRAVENOUS at 21:26

## 2019-01-01 RX ADMIN — MORPHINE SULFATE 10 MILLIGRAM(S): 50 CAPSULE, EXTENDED RELEASE ORAL at 17:14

## 2019-01-01 RX ADMIN — Medication 1 TABLET(S): at 21:48

## 2019-01-01 RX ADMIN — Medication 650 MILLIGRAM(S): at 05:01

## 2019-01-01 RX ADMIN — Medication 100 MILLIGRAM(S): at 06:03

## 2019-01-01 RX ADMIN — Medication 40 MILLIGRAM(S): at 05:07

## 2019-01-01 RX ADMIN — MORPHINE SULFATE 5 MILLIGRAM(S): 50 CAPSULE, EXTENDED RELEASE ORAL at 13:04

## 2019-01-01 RX ADMIN — Medication 500 MILLIGRAM(S): at 05:34

## 2019-01-01 RX ADMIN — MORPHINE SULFATE 2 MILLIGRAM(S): 50 CAPSULE, EXTENDED RELEASE ORAL at 01:40

## 2019-01-01 RX ADMIN — Medication 100 MILLIGRAM(S): at 05:34

## 2019-01-01 RX ADMIN — Medication 1 TABLET(S): at 13:23

## 2019-01-01 RX ADMIN — Medication 1 TABLET(S): at 21:56

## 2019-01-01 RX ADMIN — MORPHINE SULFATE 10 MILLIGRAM(S): 50 CAPSULE, EXTENDED RELEASE ORAL at 19:26

## 2019-01-01 RX ADMIN — CEFEPIME 100 MILLIGRAM(S): 1 INJECTION, POWDER, FOR SOLUTION INTRAMUSCULAR; INTRAVENOUS at 06:33

## 2019-01-01 RX ADMIN — Medication 500 MILLIGRAM(S): at 06:30

## 2019-01-01 RX ADMIN — MORPHINE SULFATE 10 MILLIGRAM(S): 50 CAPSULE, EXTENDED RELEASE ORAL at 22:27

## 2019-01-01 RX ADMIN — MORPHINE SULFATE 10 MILLIGRAM(S): 50 CAPSULE, EXTENDED RELEASE ORAL at 05:51

## 2019-01-01 RX ADMIN — Medication 1 TABLET(S): at 12:05

## 2019-01-01 RX ADMIN — Medication 1 TABLET(S): at 12:23

## 2019-01-01 RX ADMIN — Medication 100 MILLIGRAM(S): at 05:18

## 2019-01-01 RX ADMIN — Medication 1 TABLET(S): at 05:42

## 2019-01-01 RX ADMIN — MORPHINE SULFATE 10 MILLIGRAM(S): 50 CAPSULE, EXTENDED RELEASE ORAL at 21:52

## 2019-01-01 RX ADMIN — Medication 1 TABLET(S): at 05:18

## 2019-01-01 RX ADMIN — Medication 1 APPLICATION(S): at 18:54

## 2019-01-01 RX ADMIN — MORPHINE SULFATE 10 MILLIGRAM(S): 50 CAPSULE, EXTENDED RELEASE ORAL at 13:14

## 2019-01-01 RX ADMIN — Medication 500 MILLIGRAM(S): at 17:48

## 2019-01-01 RX ADMIN — Medication 650 MILLIGRAM(S): at 12:42

## 2019-01-01 RX ADMIN — INFLUENZA VIRUS VACCINE 0.5 MILLILITER(S): 15; 15; 15; 15 SUSPENSION INTRAMUSCULAR at 14:52

## 2019-01-01 RX ADMIN — LATANOPROST 1 DROP(S): 0.05 SOLUTION/ DROPS OPHTHALMIC; TOPICAL at 22:35

## 2019-01-01 RX ADMIN — Medication 100 MILLIGRAM(S): at 05:07

## 2019-01-01 RX ADMIN — Medication 650 MILLIGRAM(S): at 01:47

## 2019-01-01 RX ADMIN — MORPHINE SULFATE 10 MILLIGRAM(S): 50 CAPSULE, EXTENDED RELEASE ORAL at 11:34

## 2019-01-01 RX ADMIN — Medication 650 MILLIGRAM(S): at 06:50

## 2019-01-01 RX ADMIN — MORPHINE SULFATE 2 MILLIGRAM(S): 50 CAPSULE, EXTENDED RELEASE ORAL at 06:55

## 2019-01-01 RX ADMIN — Medication 100 MILLIGRAM(S): at 13:34

## 2019-01-01 RX ADMIN — MORPHINE SULFATE 5 MILLIGRAM(S): 50 CAPSULE, EXTENDED RELEASE ORAL at 16:30

## 2019-01-01 RX ADMIN — CEFEPIME 100 MILLIGRAM(S): 1 INJECTION, POWDER, FOR SOLUTION INTRAMUSCULAR; INTRAVENOUS at 21:40

## 2019-01-01 RX ADMIN — HEPARIN SODIUM 1000 UNIT(S)/HR: 5000 INJECTION INTRAVENOUS; SUBCUTANEOUS at 16:08

## 2019-01-01 RX ADMIN — Medication 650 MILLIGRAM(S): at 11:26

## 2019-01-01 RX ADMIN — Medication 40 MILLIGRAM(S): at 11:00

## 2019-01-01 RX ADMIN — CHLORHEXIDINE GLUCONATE 1 APPLICATION(S): 213 SOLUTION TOPICAL at 05:50

## 2019-01-01 RX ADMIN — Medication 1 TABLET(S): at 12:01

## 2019-01-01 RX ADMIN — MORPHINE SULFATE 10 MILLIGRAM(S): 50 CAPSULE, EXTENDED RELEASE ORAL at 23:00

## 2019-01-01 RX ADMIN — Medication 325 MILLIGRAM(S): at 17:34

## 2019-01-01 RX ADMIN — Medication 1 MILLIGRAM(S): at 12:02

## 2019-01-01 RX ADMIN — LATANOPROST 1 DROP(S): 0.05 SOLUTION/ DROPS OPHTHALMIC; TOPICAL at 21:38

## 2019-01-01 RX ADMIN — HEPARIN SODIUM 9 UNIT(S)/HR: 5000 INJECTION INTRAVENOUS; SUBCUTANEOUS at 02:55

## 2019-01-01 RX ADMIN — CHLORHEXIDINE GLUCONATE 1 APPLICATION(S): 213 SOLUTION TOPICAL at 06:29

## 2019-01-01 RX ADMIN — MORPHINE SULFATE 10 MILLIGRAM(S): 50 CAPSULE, EXTENDED RELEASE ORAL at 19:24

## 2019-01-01 RX ADMIN — Medication 40 MILLIEQUIVALENT(S): at 14:43

## 2019-01-01 RX ADMIN — Medication 100 MILLIGRAM(S): at 21:48

## 2019-01-01 RX ADMIN — MORPHINE SULFATE 10 MILLIGRAM(S): 50 CAPSULE, EXTENDED RELEASE ORAL at 01:11

## 2019-01-01 RX ADMIN — AZITHROMYCIN 255 MILLIGRAM(S): 500 TABLET, FILM COATED ORAL at 14:50

## 2019-01-01 RX ADMIN — POLYETHYLENE GLYCOL 3350 17 GRAM(S): 17 POWDER, FOR SOLUTION ORAL at 11:26

## 2019-01-01 RX ADMIN — Medication 25 MILLIGRAM(S): at 21:51

## 2019-01-01 RX ADMIN — CEFEPIME 100 MILLIGRAM(S): 1 INJECTION, POWDER, FOR SOLUTION INTRAMUSCULAR; INTRAVENOUS at 06:36

## 2019-01-01 RX ADMIN — Medication 25 MILLIGRAM(S): at 06:36

## 2019-01-01 RX ADMIN — Medication 1 TABLET(S): at 11:03

## 2019-01-01 RX ADMIN — Medication 1 TABLET(S): at 06:03

## 2019-01-01 RX ADMIN — Medication 325 MILLIGRAM(S): at 12:06

## 2019-01-01 RX ADMIN — Medication 400 MILLIGRAM(S): at 22:03

## 2019-01-01 RX ADMIN — Medication 100 MILLIGRAM(S): at 05:11

## 2019-01-01 RX ADMIN — SCOPALAMINE 1 PATCH: 1 PATCH, EXTENDED RELEASE TRANSDERMAL at 15:55

## 2019-01-01 RX ADMIN — Medication 325 MILLIGRAM(S): at 05:15

## 2019-01-01 RX ADMIN — MORPHINE SULFATE 10 MILLIGRAM(S): 50 CAPSULE, EXTENDED RELEASE ORAL at 16:26

## 2019-01-01 RX ADMIN — Medication 100 MILLIGRAM(S): at 05:01

## 2019-01-01 RX ADMIN — LATANOPROST 1 DROP(S): 0.05 SOLUTION/ DROPS OPHTHALMIC; TOPICAL at 22:19

## 2019-01-01 RX ADMIN — Medication 1 APPLICATION(S): at 19:51

## 2019-01-01 RX ADMIN — LATANOPROST 1 DROP(S): 0.05 SOLUTION/ DROPS OPHTHALMIC; TOPICAL at 22:32

## 2019-01-01 RX ADMIN — MORPHINE SULFATE 10 MILLIGRAM(S): 50 CAPSULE, EXTENDED RELEASE ORAL at 12:30

## 2019-01-01 RX ADMIN — SODIUM CHLORIDE 500 MILLILITER(S): 9 INJECTION INTRAMUSCULAR; INTRAVENOUS; SUBCUTANEOUS at 20:11

## 2019-01-01 RX ADMIN — Medication 1 MILLIGRAM(S): at 11:23

## 2019-01-01 RX ADMIN — HEPARIN SODIUM 4000 UNIT(S): 5000 INJECTION INTRAVENOUS; SUBCUTANEOUS at 16:08

## 2019-01-01 RX ADMIN — MORPHINE SULFATE 10 MILLIGRAM(S): 50 CAPSULE, EXTENDED RELEASE ORAL at 21:45

## 2019-01-01 RX ADMIN — Medication 325 MILLIGRAM(S): at 08:44

## 2019-01-01 RX ADMIN — MORPHINE SULFATE 10 MILLIGRAM(S): 50 CAPSULE, EXTENDED RELEASE ORAL at 01:39

## 2019-01-01 RX ADMIN — Medication 100 MILLIGRAM(S): at 14:30

## 2019-01-01 RX ADMIN — MORPHINE SULFATE 10 MILLIGRAM(S): 50 CAPSULE, EXTENDED RELEASE ORAL at 15:32

## 2019-01-01 RX ADMIN — Medication 1 TABLET(S): at 22:31

## 2019-01-01 RX ADMIN — MORPHINE SULFATE 5 MILLIGRAM(S): 50 CAPSULE, EXTENDED RELEASE ORAL at 13:05

## 2019-01-01 RX ADMIN — Medication 1 TABLET(S): at 06:30

## 2019-01-01 RX ADMIN — SODIUM CHLORIDE 30 MILLILITER(S): 9 INJECTION, SOLUTION INTRAVENOUS at 22:20

## 2019-01-01 RX ADMIN — MORPHINE SULFATE 10 MILLIGRAM(S): 50 CAPSULE, EXTENDED RELEASE ORAL at 18:55

## 2019-01-01 RX ADMIN — SCOPALAMINE 1 PATCH: 1 PATCH, EXTENDED RELEASE TRANSDERMAL at 15:18

## 2019-01-01 RX ADMIN — Medication 325 MILLIGRAM(S): at 05:24

## 2019-01-01 RX ADMIN — Medication 1 MILLIGRAM(S): at 12:46

## 2019-01-01 RX ADMIN — Medication 500 MILLIGRAM(S): at 12:23

## 2019-01-01 RX ADMIN — Medication 500 MILLIGRAM(S): at 17:43

## 2019-01-01 RX ADMIN — CEFEPIME 100 MILLIGRAM(S): 1 INJECTION, POWDER, FOR SOLUTION INTRAMUSCULAR; INTRAVENOUS at 17:08

## 2019-01-01 RX ADMIN — POLYETHYLENE GLYCOL 3350 17 GRAM(S): 17 POWDER, FOR SOLUTION ORAL at 12:05

## 2019-01-01 RX ADMIN — MORPHINE SULFATE 10 MILLIGRAM(S): 50 CAPSULE, EXTENDED RELEASE ORAL at 03:18

## 2019-01-01 RX ADMIN — Medication 500 MILLIGRAM(S): at 05:24

## 2019-01-01 RX ADMIN — Medication 1 TABLET(S): at 05:07

## 2019-01-01 RX ADMIN — Medication 1 TABLET(S): at 13:33

## 2019-01-01 RX ADMIN — CHLORHEXIDINE GLUCONATE 1 APPLICATION(S): 213 SOLUTION TOPICAL at 06:02

## 2019-01-01 RX ADMIN — Medication 1 MILLIGRAM(S): at 12:10

## 2019-01-01 RX ADMIN — Medication 1 TABLET(S): at 21:40

## 2019-01-01 RX ADMIN — Medication 100 MILLIGRAM(S): at 21:33

## 2019-01-01 RX ADMIN — Medication 650 MILLIGRAM(S): at 06:52

## 2019-01-01 RX ADMIN — Medication 500 MILLIGRAM(S): at 18:10

## 2019-01-01 RX ADMIN — Medication 100 MILLIGRAM(S): at 13:57

## 2019-01-01 RX ADMIN — Medication 325 MILLIGRAM(S): at 08:28

## 2019-01-01 RX ADMIN — Medication 100 MILLIGRAM(S): at 13:53

## 2019-01-01 RX ADMIN — CEFEPIME 100 MILLIGRAM(S): 1 INJECTION, POWDER, FOR SOLUTION INTRAMUSCULAR; INTRAVENOUS at 05:07

## 2019-01-01 RX ADMIN — Medication 1 TABLET(S): at 12:10

## 2019-01-01 RX ADMIN — Medication 1 TABLET(S): at 05:24

## 2019-01-01 RX ADMIN — Medication 325 MILLIGRAM(S): at 05:01

## 2019-01-01 RX ADMIN — Medication 100 MILLIGRAM(S): at 05:15

## 2019-01-01 RX ADMIN — CHLORHEXIDINE GLUCONATE 1 APPLICATION(S): 213 SOLUTION TOPICAL at 05:15

## 2019-01-01 RX ADMIN — Medication 500 MILLIGRAM(S): at 18:01

## 2019-01-01 RX ADMIN — MORPHINE SULFATE 2 MILLIGRAM(S): 50 CAPSULE, EXTENDED RELEASE ORAL at 02:10

## 2019-01-01 RX ADMIN — CHLORHEXIDINE GLUCONATE 1 APPLICATION(S): 213 SOLUTION TOPICAL at 05:34

## 2019-01-01 RX ADMIN — Medication 25 MILLIGRAM(S): at 14:14

## 2019-01-01 RX ADMIN — Medication 500 MILLIGRAM(S): at 17:44

## 2019-01-01 RX ADMIN — MORPHINE SULFATE 10 MILLIGRAM(S): 50 CAPSULE, EXTENDED RELEASE ORAL at 11:21

## 2019-01-01 RX ADMIN — ROBINUL 0.4 MILLIGRAM(S): 0.2 INJECTION INTRAMUSCULAR; INTRAVENOUS at 16:59

## 2019-01-01 RX ADMIN — MORPHINE SULFATE 5 MILLIGRAM(S): 50 CAPSULE, EXTENDED RELEASE ORAL at 21:58

## 2019-01-01 RX ADMIN — LATANOPROST 1 DROP(S): 0.05 SOLUTION/ DROPS OPHTHALMIC; TOPICAL at 22:03

## 2019-01-01 RX ADMIN — Medication 325 MILLIGRAM(S): at 11:26

## 2019-01-01 RX ADMIN — Medication 325 MILLIGRAM(S): at 05:34

## 2019-01-01 RX ADMIN — MORPHINE SULFATE 2 MILLIGRAM(S): 50 CAPSULE, EXTENDED RELEASE ORAL at 07:30

## 2019-01-01 RX ADMIN — Medication 1 MILLIGRAM(S): at 12:06

## 2019-01-17 NOTE — ED PROVIDER NOTE - MEDICAL DECISION MAKING DETAILS
89 yo female with acute left hip fx and scalp laceration.  Ortho notified, admit to medicine. 89 yo female with acute left hip fx and scalp laceration.  Ortho notified, admit to medicine..

## 2019-01-17 NOTE — ED PROCEDURE NOTE - PROCEDURE ADDITIONAL DETAILS
3cm linear parietal scalp laceration repaired with 6 staples. 1cm occipital scalp laceration repaired with 2 staples. 8 staples total.

## 2019-01-17 NOTE — ED PROVIDER NOTE - OBJECTIVE STATEMENT
89yo F with PMHx HTN p/w left hip pain and scalp lacerations s/p mechanical fall. Pt states she was trying to sit down on a chair but missed the seat and fell, hit the back/side of her head on the cabinet and landed on her left side. No LOC, not on AC, takes baby aspirin. Called for help immediately and found by daughter who called EMS. Currently c/o left hip pain. Also noted to have 2 scalp lacs to left parietal and occipital scalp. Denies headache, lightheadedness, visual changes. Denies fever, CP, SOB, cough, palpitations, nausea, vomiting, diarrhea, abd pain, dysuria. 89yo F with PMHx HTN p/w left hip pain and scalp lacerations s/p mechanical fall. Pt states she was trying to sit down on a chair but missed the seat and fell, hit the back/side of her head on the cabinet and landed on her left side. No LOC, not on AC, takes baby aspirin. Called for help immediately and found by daughter who called EMS. Currently c/o left hip pain. Also noted to have 2 scalp lacs to left parietal and occipital scalp. Denies headache, lightheadedness, visual changes. Denies fever, CP, SOB, cough, palpitations, nausea, vomiting, diarrhea, abd pain, dysuria. Tetanus UTD (Given 2018)

## 2019-01-17 NOTE — ED PROVIDER NOTE - NS ED ROS FT
Constitutional: No fever  Eyes:  No visual changes  ENMT:  No neck pain  Cardiac:  No chest pain  Respiratory:  No cough, SOB  GI:  No nausea, vomiting, diarrhea, abdominal pain  :  No dysuria  MS:  +left hip pain  Neuro:  No headache or lightheadedness  Skin:  No skin rash  Endocrine: No history of thyroid disease or diabetes  Except as documented in the HPI,  all other systems are negative

## 2019-01-17 NOTE — ED PROVIDER NOTE - PHYSICAL EXAMINATION
Vital signs reviewed  GENERAL: Patient nontoxic appearing, NAD  HEAD: 2cm and 3cm scalp lacerations to occipital and parietal scalp  EYES: PERRL, EOMI  ENT: MMM  NECK: Supple, non tender  RESPIRATORY: Normal respiratory effort. CTA B/L. No wheezing, rales, rhonchi  CARDIOVASCULAR: Regular rate and rhythm. Normal S1/S2. No murmurs, rubs or gallops.  ABDOMEN: Soft. Nondistended. Nontender. No guarding or rebound.  MUSCULOSKELETAL/EXTREMITIES: Brisk cap refill. 2+ radial and pedal pulses. No midline back TTP. +left hip TTP. B/L leg edema. Moving RLE without pain.   SKIN:  Warm and dry.   NEURO: AAOx3. Speech clear and coherent. No facial droop. Hard of hearing. No gross FND.  PSYCHIATRIC: Cooperative. Affect appropriate Vital signs reviewed  GENERAL: Patient nontoxic appearing, NAD  HEAD: 2cm and 3cm scalp lacerations to occipital and parietal scalp  EYES: PERRL, EOMI  ENT: MMM  NECK: Supple, non tender  RESPIRATORY: Normal respiratory effort. CTA B/L. No wheezing, rales, rhonchi  CARDIOVASCULAR: Regular rate and rhythm. Normal S1/S2. No murmurs, rubs or gallops.  ABDOMEN: Soft. Nondistended. Nontender. No guarding or rebound.  MUSCULOSKELETAL/EXTREMITIES: Brisk cap refill. 2+ radial and pedal pulses. No midline back TTP. +left hip TTP. LLE shortened and externally rotated. B/L leg edema. Moving RLE without pain.   SKIN:  Warm and dry.   NEURO: AAOx3. Speech clear and coherent. No facial droop. Hard of hearing. No gross FND.  PSYCHIATRIC: Cooperative. Affect appropriate

## 2019-01-17 NOTE — ED PROVIDER NOTE - ATTENDING CONTRIBUTION TO CARE
89 yo female h/o HTN c/o left hip pain after a mechanical fall.  Patient reports she took a shower, went to her bedroom, attempted to sit down, but says " her check was not fully on the seat" , she messed it and fell, hit her head on the side of a dresser.  Denies LOC, her only complaint in left hip pain, she has not been able to ambulate since.  Well-appearing, elderly female, smiling, NAD, 2 scalp lacerations on the left, no active bleeding, no midline spine ttp, nml work of breathing, lungs CTA b/l, RRR, no chest wall or abdominal ttp, LLE is shortened and externally rotated, unable to move hip due to pain, painless ROM of all other extremities, distal pulses intact, no gross neuro deficits.  Suspected left hip fx.   Plan: labs, imaging studies, laceration repair, admit.

## 2019-01-17 NOTE — ED ADULT NURSE NOTE - NSFALLRSKHRMRISKTYPE_ED_ALL_ED
coagulation(Bleeding disorder R/T clinical cond/anti-coags)/age(85 years old or older)/bones(Osteoporosis,prev fx,steroid use,metastatic bone ca)

## 2019-01-18 NOTE — H&P ADULT - HISTORY OF PRESENT ILLNESS
89yo F with PMHx HTN p/w left hip pain and scalp lacerations after sustaining mechanical fall.     Pt states she was trying to sit down on a chair but missed the seat and fell back, hitting the back/side of her head on the cabinet and landed on her left side. There was no LOC. Post fall,  pt had left hip pain and bleeding from scalp.       In ED,  Also noted to have 2 scalp lacs to left parietal and occipital scalp s/p stapling by ed. She was found to have what looks to be an acute L hip fracture on XR (pending official radiologist read and ortho input).  Aside from her hip pains,  pt denies any active headache, cp, sob, palpitations, abd pain, n/v/d.      Pt states prior to this fall,  she was completely independent,  walks with walker,  able to go up 1 flight of stairs nightly (lives on second floor) without any cp / sob / dyspnea.   She does not know of any prior cardiac or pulmonary conditions.      Pt and family does not recall name of medications.  Only taking one for blood pressure starting with aml (likely amlodipine).  Pharmacy is [facility] located between [address] and [address].  phone # [phone].  Pharmacy is currently closed right now but please call in am to confirm meds.

## 2019-01-18 NOTE — H&P ADULT - NSHPSOCIALHISTORY_GEN_ALL_CORE
non smoker  occasional wine with dinner  no drugs  walks with cane    independently ambulatory prior to fall

## 2019-01-18 NOTE — CONSULT NOTE ADULT - SUBJECTIVE AND OBJECTIVE BOX
NEPHROLOGY CONSULTATION NOTE    89 yo wf with pmh as below, good functional status for age admitted with left hip IT Fx.  s/p mechanical fall.   Also with head lac.  ON admission, pt with hyponatremia and azotemia.  D/w team.  No sob, cp, fever, n/v, abd pain.    PAST MEDICAL & SURGICAL HISTORY:  HTN (hypertension)  No significant past surgical history    Allergies:  No Known Allergies    Home Medications Reviewed    SOCIAL HISTORY:  Denies ETOH,Smoking,   FAMILY HISTORY:        REVIEW OF SYSTEMS:  All other review of systems is negative unless indicated above.    PHYSICAL EXAM:  NAD  awake and alert  moist mm  frail  no jvd  cta bl  rrr 2/6 murumur  soft, nt  no cvat  no cce    Hospital Medications:   MEDICATIONS  (STANDING):  chlorhexidine 4% Liquid 1 Application(s) Topical once  enoxaparin Injectable 40 milliGRAM(s) SubCutaneous every 24 hours        VITALS:  T(F): 97.7 (01-18-19 @ 08:00), Max: 97.7 (01-18-19 @ 08:00)  HR: 90 (01-18-19 @ 08:00)  BP: 122/65 (01-18-19 @ 08:00)  RR: 18 (01-18-19 @ 08:00)  SpO2: 99% (01-17-19 @ 17:05)  Wt(kg): --    01-17 @ 07:01  -  01-18 @ 07:00  --------------------------------------------------------  IN: 0 mL / OUT: 400 mL / NET: -400 mL      Height (cm): 149.86 (01-18 @ 00:00)  Weight (kg): 51.7 (01-18 @ 00:00)  BMI (kg/m2): 23 (01-18 @ 00:00)  BSA (m2): 1.45 (01-18 @ 00:00)    LABS:  01-18    138  |  101  |  33<H>  ----------------------------<  134<H>  4.7   |  25  |  1.0    Ca    8.7      18 Jan 2019 06:32  Mg     1.9     01-18    TPro  7.1  /  Alb  2.7<L>  /  TBili  1.0  /  DBili      /  AST  77<H>  /  ALT  29  /  AlkPhos  155<H>  01-18                          8.5    9.05  )-----------( 193      ( 18 Jan 2019 06:32 )             26.4       Urine Studies:        RADIOLOGY & ADDITIONAL STUDIES:

## 2019-01-18 NOTE — H&P ADULT - NSHPPHYSICALEXAM_GEN_ALL_CORE
Constitutional: non-toxic,  not in acute distress  HEENT: eomi,  wnl;  hard of hearing at baseline.  needs hearing aids but current ones are out of battery.    Respiratory:  clear lung sounds b/l;   Cardiovascular:  s1, s2,  regular, systolic murmur appreciated **  Gastrointestinal:  nontender, nondistended, +bowel sounds  Extremities: no edema b/l le, ROM on LLE limited by pain.  body movements that moves L hip area induces pain.    Neurological: nonfocal; wnl, aaox3    ------------------------------------------------------------------     VITAL SIGNS   T(F): 97 (01-17 @ 17:05), Max: 97 (01-17 @ 17:05)  HR: 84 (01-17 @ 17:05)  BP: 141/67 (01-17 @ 17:05)  RR: 20 (01-17 @ 17:05)  SpO2: 99% (01-17 @ 17:05)
The patient is a 2y4m Male complaining of fever.

## 2019-01-18 NOTE — H&P ADULT - ATTENDING COMMENTS
pt seen and examined independently, I have read and agree with above exam and poa,    s/p mechanical fall  no loc    hip fracture left    intermediate risk for moderate risk surgery, proceed with or    pain mgmt avoid excessive narcotics    dvt proph

## 2019-01-18 NOTE — CONSULT NOTE ADULT - ASSESSMENT
hyponatremia  dehydration with prerenal azotemia  s/p mech fall  left hip it fx  scalp lac  HTN  anemia    plan:    NS @ 50 cc/hr  hold oupt antihypertensive  await OR per ortho  medical clearance in chart by Dr. Schroeder  monitor h/h, prn prbc tx  pain control  dvt prophylaxis  service transferred to Dr. Baker  spoke with Dr. Schroeder and medical resident

## 2019-01-18 NOTE — CONSULT NOTE ADULT - SUBJECTIVE AND OBJECTIVE BOX
89F S/P GL fall this evening. Closed isolated LEFT IT fx. No other ortho injuries. Did hit head, but no LOC.   Denies Cp/sob/n/v . uses walker.      Phx: HTN   NKA  NS   Lives with son       PE:   NAD  LLE  Log roll + LEFT pain   Skin intact  SILT sp/dp/t   WWP toes    xr: :LEFT IT fx       a/p 89F L IT fx   NPO   Plan for surgery  med cl  2 units   ortho to follow

## 2019-01-19 NOTE — BRIEF OPERATIVE NOTE - PRE-OP DX
Closed comminuted intertrochanteric fracture of left femur, initial encounter  01/19/2019    Active  Tyrell Villalobos

## 2019-01-19 NOTE — CHART NOTE - NSCHARTNOTEFT_GEN_A_CORE
PACU ANESTHESIA ADMISSION NOTE      Procedure: left hip ORIF   Post op diagnosis:  left hip fracture     ____  Intubated  TV:______       Rate: ______      FiO2: ______    _x___  Patent Airway    _x___  Full return of protective reflexes    _x___  Full recovery from anesthesia / back to baseline status    Vitals:  T(F): 98.9   HR: 83  BP: 168/78  RR: 20  SpO2: 100%    Mental Status:  _x___ Awake   __x___ Alert   _____ Drowsy   _____ Sedated    Nausea/Vomiting:  _x___  NO       ______Yes,   See Post - Op Orders         Pain Scale (0-10):  __0___    Treatment: ____ None    _x___ See Post - Op/PCA Orders    Post - Operative Fluids:   ____ Oral   __x__ See Post - Op Orders    Plan: Discharge:   ____Home       ___x__Floor     _____Critical Care    _____  Other:_________________    Comments:  No anesthesia issues or complications noted.  Discharge when criteria met.

## 2019-01-19 NOTE — BRIEF OPERATIVE NOTE - PROCEDURE
<<-----Click on this checkbox to enter Procedure Femur fracture surgery  01/19/2019    Active  DHOLLERN

## 2019-01-19 NOTE — PROGRESS NOTE ADULT - SUBJECTIVE AND OBJECTIVE BOX
Preop Note    Planned Procedure: L hip ORIF 1/19/19  (cancelled 1/18 due to OR availability)    NPO    Cleared                          8.5    9.05  )-----------( 193      ( 18 Jan 2019 06:32 )             26.4     Vital Signs Last 24 Hrs  T(C): 35.7 (18 Jan 2019 16:00), Max: 36.5 (18 Jan 2019 08:00)  T(F): 96.2 (18 Jan 2019 16:00), Max: 97.7 (18 Jan 2019 08:00)  HR: 85 (18 Jan 2019 16:00) (85 - 90)  BP: 125/60 (18 Jan 2019 16:00) (122/65 - 125/60)  BP(mean): --  RR: 18 (18 Jan 2019 16:00) (18 - 18)  SpO2: --

## 2019-01-19 NOTE — BRIEF OPERATIVE NOTE - POST-OP DX
Closed displaced intertrochanteric fracture of femur, unspecified laterality, initial encounter  01/19/2019    Active  Tyrell Villalobos

## 2019-01-20 NOTE — CONSULT NOTE ADULT - SUBJECTIVE AND OBJECTIVE BOX
HPI:  89 y.o. F with PMHx of HTN p/w left hip pain and scalp lacerations after sustaining mechanical fall.     Pt states she was trying to sit down on a chair but missed the seat and fell back, hitting the back/side of her head on the cabinet and landed on her left side. There was no LOC. Post fall,  pt had left hip pain and bleeding from scalp.       In ED,  Also noted to have 2 scalp lacs to left parietal and occipital scalp s/p stapling by ed. She was found to have what looks to be an acute L hip fracture on XR (pending official radiologist read and ortho input).  Aside from her hip pains,  pt denies any active headache, cp, sob, palpitations, abd pain, n/v/d.      Pt states prior to this fall,  she was completely independent,  walks with walker,  able to go up 1 flight of stairs nightly (lives on second floor) without any cp / sob / dyspnea.   She does not know of any prior cardiac or pulmonary conditions.      Pt and family does not recall name of medications.  Only taking one for blood pressure starting with aml (likely amlodipine).  Pharmacy is [facility] located between [address] and [facility].  phone # [phone].  Pharmacy is currently closed right now but please call in am to confirm meds. (18 Jan 2019 00:12)      PAST MEDICAL & SURGICAL HISTORY:  HTN (hypertension)  No significant past surgical history      Hospital Course:  S/p left hip IM nailing on 01/19/2019    TODAY'S SUBJECTIVE & REVIEW OF SYMPTOMS:     Constitutional WNL   Cardio WNL   Resp WNL   GI WNL  Heme WNL  Endo WNL  Skin WNL  MSK WNL  Neuro WNL  Cognitive WNL  Psych WNL      MEDICATIONS  (STANDING):  acetaminophen   Tablet .. 650 milliGRAM(s) Oral every 6 hours  ascorbic acid 500 milliGRAM(s) Oral two times a day  aspirin enteric coated 325 milliGRAM(s) Oral two times a day  calcium carbonate 1250 mG  + Vitamin D (OsCal 500 + D) 1 Tablet(s) Oral three times a day  docusate sodium 100 milliGRAM(s) Oral three times a day  ferrous    sulfate 325 milliGRAM(s) Oral three times a day with meals  folic acid 1 milliGRAM(s) Oral daily  multivitamin 1 Tablet(s) Oral daily  polyethylene glycol 3350 17 Gram(s) Oral daily    MEDICATIONS  (PRN):  aluminum hydroxide/magnesium hydroxide/simethicone Suspension 30 milliLiter(s) Oral four times a day PRN Indigestion  magnesium hydroxide Suspension 30 milliLiter(s) Oral daily PRN Constipation  morphine  - Injectable 2 milliGRAM(s) IV Push every 3 hours PRN Severe Pain (7 - 10)  ondansetron Injectable 4 milliGRAM(s) IV Push every 6 hours PRN Nausea and/or Vomiting  oxyCODONE    IR 5 milliGRAM(s) Oral every 4 hours PRN Mild Pain (1 - 3)  senna 2 Tablet(s) Oral at bedtime PRN Constipation      FAMILY HISTORY:      Allergies    No Known Allergies    Intolerances        SOCIAL HISTORY:    [  ] EtOH  [  ] Smoking  [  ] Substance abuse     Home Environment:  [  ] Home Alone  [  ] Lives with Family  [  ] Home Health Aide    Dwelling:  [  ] Apartment  [  ] Private House  [  ] Adult Home  [  ] Skilled Nursing Facility      [  ] Short Term  [  ] Long Term  [  ] Stairs       Elevator [  ]    FUNCTIONAL STATUS PTA: (Check all that apply)  Ambulation: [   ]Independent    [  ] Dependent     [  ] Non-Ambulatory  Assistive Device: [  ] SA Cane  [  ]  Q Cane  [  ] Walker  [  ]  Wheelchair  ADL : [  ] Independent  [  ]  Dependent       Vital Signs Last 24 Hrs  T(C): 36.4 (20 Jan 2019 07:20), Max: 37.2 (19 Jan 2019 11:24)  T(F): 97.5 (20 Jan 2019 07:20), Max: 98.9 (19 Jan 2019 11:24)  HR: 71 (20 Jan 2019 07:20) (62 - 90)  BP: 138/64 (20 Jan 2019 07:20) (115/56 - 172/74)  BP(mean): --  RR: 18 (20 Jan 2019 07:20) (15 - 22)  SpO2: 97% (19 Jan 2019 13:09) (97% - 100%)      PHYSICAL EXAM: Alert & Oriented X 3  GENERAL: NAD, well-groomed, well-developed  HEAD:  Atraumatic, Normocephalic  EYES: EOMI, PERRLA, conjunctiva and sclera clear  NECK: Supple, No JVD, Normal thyroid  CHEST/LUNG: Clear to percussion bilaterally; No rales, rhonchi, wheezing, or rubs  HEART: Regular rate and rhythm; No murmurs, rubs, or gallops  ABDOMEN: Soft, Nontender, Nondistended; Bowel sounds present  EXTREMITIES:  2+ Peripheral Pulses, No clubbing, cyanosis, or edema    NERVOUS SYSTEM:  Cranial Nerves 2-12 intact [  ] Abnormal  [  ]  ROM: WFL all extremities [  ]  Abnormal [  ]  Motor Strength: WFL all extremities  [  ]  Abnormal [  ]  Sensation: intact to light touch [  ] Abnormal [  ]  Reflexes: Symmetric [  ]  Abnormal [  ]    FUNCTIONAL STATUS:  Bed Mobility: Independent [  ]  Supervision [  ]  Needs Assistance [  ]  N/A [  ]  Transfers: Independent [  ]  Supervision [  ]  Needs Assistance [  ]  N/A [  ]   Ambulation: Independent [  ]  Supervision [  ]  Needs Assistance [  ]  N/A [  ]  ADL: Independent [  ] Requires Assistance [  ] N/A [  ]      LABS:                        9.3    11.94 )-----------( 166      ( 19 Jan 2019 11:43 )             28.6     01-19    136  |  102  |  28<H>  ----------------------------<  151<H>  5.3<H>   |  18  |  1.0    Ca    7.8<L>      19 Jan 2019 11:47            RADIOLOGY & ADDITIONAL STUDIES:      EXAM:  XR FEMUR 2 VIEWS LT            PROCEDURE DATE:  01/17/2019            INTERPRETATION:  Clinical History / Reason for exam: Trauma    4 views.    Findings/  impression: Comminuted displaced intertrochanteric fracture with lesser   and greater trochanteric avulsion. Degenerative arthritis. Soft tissue   vascular calcification. Calcified fibroid uterus. HPI:  89 y.o. right-handed white F with PMHx of HTN p/w left hip pain and scalp lacerations after sustaining mechanical fall.     Pt states she was trying to sit down on a chair but missed the seat and fell back, hitting the back/side of her head on the cabinet and landed on her left side. There was no LOC. Post fall,  pt had left hip pain and bleeding from scalp.       In ED,  Also noted to have 2 scalp lacs to left parietal and occipital scalp s/p stapling by ed. She was found to have what looks to be an acute L hip fracture on XR (pending official radiologist read and ortho input).  Aside from her hip pains,  pt denies any active headache, cp, sob, palpitations, abd pain, n/v/d.      Pt states prior to this fall,  she was completely independent,  walks with walker,  able to go up 1 flight of stairs nightly (lives on second floor) without any cp / sob / dyspnea.   She does not know of any prior cardiac or pulmonary conditions.      Pt and family does not recall name of medications.  Only taking one for blood pressure starting with aml (likely amlodipine).  Pharmacy is [facility] located between [address] and [facility][1].  phone # [phone].  Pharmacy is currently closed right now but please call in am to confirm meds. (18 Jan 2019 00:12)      PAST MEDICAL & SURGICAL HISTORY:  HTN (hypertension)  No significant past surgical history      Hospital Course:  S/p left hip IM nailing on 01/19/2019    TODAY'S SUBJECTIVE & REVIEW OF SYMPTOMS:     Constitutional WNL   Cardio WNL   Resp Mild SOB at rest   GI WNL  Heme WNL  Endo WNL  Skin WNL  MSK + left hip pain  Neuro WNL  Cognitive WNL  Psych WNL      MEDICATIONS  (STANDING):  acetaminophen   Tablet .. 650 milliGRAM(s) Oral every 6 hours  ascorbic acid 500 milliGRAM(s) Oral two times a day  aspirin enteric coated 325 milliGRAM(s) Oral two times a day  calcium carbonate 1250 mG  + Vitamin D (OsCal 500 + D) 1 Tablet(s) Oral three times a day  docusate sodium 100 milliGRAM(s) Oral three times a day  ferrous    sulfate 325 milliGRAM(s) Oral three times a day with meals  folic acid 1 milliGRAM(s) Oral daily  multivitamin 1 Tablet(s) Oral daily  polyethylene glycol 3350 17 Gram(s) Oral daily    MEDICATIONS  (PRN):  aluminum hydroxide/magnesium hydroxide/simethicone Suspension 30 milliLiter(s) Oral four times a day PRN Indigestion  magnesium hydroxide Suspension 30 milliLiter(s) Oral daily PRN Constipation  morphine  - Injectable 2 milliGRAM(s) IV Push every 3 hours PRN Severe Pain (7 - 10)  ondansetron Injectable 4 milliGRAM(s) IV Push every 6 hours PRN Nausea and/or Vomiting  oxyCODONE    IR 5 milliGRAM(s) Oral every 4 hours PRN Mild Pain (1 - 3)  senna 2 Tablet(s) Oral at bedtime PRN Constipation      FAMILY HISTORY:      Allergies    No Known Allergies    Intolerances        SOCIAL HISTORY:    [  ] EtOH  [  ] Smoking  [  ] Substance abuse     Home Environment:  [  ] Home Alone  [X  ] Lives with Family--son  [  ] Home Health Aide    Dwelling:  [  ] Apartment  [ X ] Private House  [  ] Adult Home  [  ] Skilled Nursing Facility      [  ] Short Term  [  ] Long Term  [ X ] Stairs  4 steps to enter and 13 inside     Elevator [  ]    FUNCTIONAL STATUS PTA: (Check all that apply)  Ambulation: [ X  ]Independent    [  ] Dependent     [  ] Non-Ambulatory  Assistive Device: [ X ] SA Cane  [  ]  Q Cane  [ X ] Walker  [  ]  Wheelchair  ADL : [ X ] Independent  [  ]  Dependent       Vital Signs Last 24 Hrs  T(C): 36.4 (20 Jan 2019 07:20), Max: 37.2 (19 Jan 2019 11:24)  T(F): 97.5 (20 Jan 2019 07:20), Max: 98.9 (19 Jan 2019 11:24)  HR: 71 (20 Jan 2019 07:20) (62 - 90)  BP: 138/64 (20 Jan 2019 07:20) (115/56 - 172/74)  BP(mean): --  RR: 18 (20 Jan 2019 07:20) (15 - 22)  SpO2: 97% (19 Jan 2019 13:09) (97% - 100%)      PHYSICAL EXAM: Alert & Oriented X 3  GENERAL: NAD, well-groomed, well-developed  HEAD:  Atraumatic, Normocephalic  EYES: EOMI, PERRLA, conjunctiva and sclera clear  NECK: Supple, No JVD, Normal thyroid  CHEST/LUNG: Clear to percussion bilaterally; No rales, rhonchi, wheezing, or rubs  HEART: Regular rate and rhythm; No murmurs, rubs, or gallops  ABDOMEN: Soft, Nontender, Nondistended; Bowel sounds present  EXTREMITIES:  + LLE trace edema    NERVOUS SYSTEM:  Cranial Nerves 2-12 intact [ X ] Abnormal  [  ]  ROM: WFL all extremities [  ]  Abnormal [ X ]  Motor Strength: WFL all extremities  [  ]  Abnormal [ X ]  Sensation: intact to light touch [ X ] Abnormal [  ]  Reflexes: Symmetric [  ]  Abnormal [  ]    FUNCTIONAL STATUS:  Bed Mobility: Independent [  ]  Supervision [  ]  Needs Assistance [ X ]  N/A [  ]  Transfers: Independent [  ]  Supervision [  ]  Needs Assistance [X  ]  N/A [  ]   Ambulation: Independent [  ]  Supervision [  ]  Needs Assistance [X  ]  N/A [  ]  ADL: Independent [  ] Requires Assistance [ X ] N/A [  ]      LABS:                        9.3    11.94 )-----------( 166      ( 19 Jan 2019 11:43 )             28.6     01-19    136  |  102  |  28<H>  ----------------------------<  151<H>  5.3<H>   |  18  |  1.0    Ca    7.8<L>      19 Jan 2019 11:47            RADIOLOGY & ADDITIONAL STUDIES:      EXAM:  XR FEMUR 2 VIEWS LT            PROCEDURE DATE:  01/17/2019            INTERPRETATION:  Clinical History / Reason for exam: Trauma    4 views.    Findings/  impression: Comminuted displaced intertrochanteric fracture with lesser   and greater trochanteric avulsion. Degenerative arthritis. Soft tissue   vascular calcification. Calcified fibroid uterus.

## 2019-01-20 NOTE — CONSULT NOTE ADULT - REASON FOR ADMISSION
L HIP FRACTURE S/P FALL;  SCALP LACERATION

## 2019-01-20 NOTE — PROVIDER CONTACT NOTE (MEDICATION) - ACTION/TREATMENT ORDERED:
I will order latanoprost however have the family bring the eye vitamins as we do not carry it at the pharmacy.

## 2019-01-20 NOTE — PROGRESS NOTE ADULT - SUBJECTIVE AND OBJECTIVE BOX
STARLA SPEAR 89y Female  MRN#: [id number]   CODE STATUS: full      [id number] F with PMHx HTN p/w left hip pain and scalp lacerations after sustaining mechanical fall.   Pt states prior to this fall,  she was completely independent,  walks with walker. Yesterday, patient had OR. No complications. No events overnight.   OBJECTIVE  PAST MEDICAL & SURGICAL HISTORY  HTN (hypertension)  No significant past surgical history    ALLERGIES:  No Known Allergies    MEDICATIONS:  STANDING MEDICATIONS  acetaminophen   Tablet .. 650 milliGRAM(s) Oral every 6 hours  ascorbic acid 500 milliGRAM(s) Oral two times a day  aspirin enteric coated 325 milliGRAM(s) Oral two times a day  calcium carbonate 1250 mG  + Vitamin D (OsCal 500 + D) 1 Tablet(s) Oral three times a day  docusate sodium 100 milliGRAM(s) Oral three times a day  ferrous    sulfate 325 milliGRAM(s) Oral three times a day with meals  folic acid 1 milliGRAM(s) Oral daily  multivitamin 1 Tablet(s) Oral daily  polyethylene glycol 3350 17 Gram(s) Oral daily    PRN MEDICATIONS  aluminum hydroxide/magnesium hydroxide/simethicone Suspension 30 milliLiter(s) Oral four times a day PRN  magnesium hydroxide Suspension 30 milliLiter(s) Oral daily PRN  morphine  - Injectable 2 milliGRAM(s) IV Push every 3 hours PRN  ondansetron Injectable 4 milliGRAM(s) IV Push every 6 hours PRN  oxyCODONE    IR 5 milliGRAM(s) Oral every 4 hours PRN  senna 2 Tablet(s) Oral at bedtime PRN      VITAL SIGNS: Last 24 Hours  T(C): 36.4 (20 Jan 2019 00:00), Max: 37.2 (19 Jan 2019 11:24)  T(F): 97.5 (20 Jan 2019 00:00), Max: 98.9 (19 Jan 2019 11:24)  HR: 90 (20 Jan 2019 00:00) (62 - 90)  BP: 121/58 (20 Jan 2019 00:00) (115/56 - 172/74)  BP(mean): --  RR: 18 (20 Jan 2019 00:00) (15 - 22)  SpO2: 97% (19 Jan 2019 13:09) (97% - 100%)    LABS:                        9.3    11.94 )-----------( 166      ( 19 Jan 2019 11:43 )             28.6     01-19    136  |  102  |  28<H>  ----------------------------<  151<H>  5.3<H>   |  18  |  1.0    Ca    7.8<L>      19 Jan 2019 11:47  Mg     1.9     01-18    TPro  7.1  /  Alb  2.7<L>  /  TBili  1.0  /  DBili  x   /  AST  77<H>  /  ALT  29  /  AlkPhos  155<H>  01-18    PT/INR - ( 18 Jan 2019 06:32 )   PT: 15.10 sec;   INR: 1.32 ratio               PHYSICAL EXAM:    GENERAL: NAD, well-developed, AAOx3  HEENT:  Atraumatic, Normocephalic. EOMI, PERRLA, conjunctiva and sclera clear, No JVD  PULMONARY: Clear to auscultation bilaterally; No wheeze  CARDIOVASCULAR: Regular rate and rhythm; 2/6 murmur systolic  GASTROINTESTINAL: Soft, Nontender, Nondistended; Bowel sounds present  MUSCULOSKELETAL:  2+ Peripheral Pulses, No clubbing, cyanosis, or edema, surgical site dressing clean  NEUROLOGY: non-focal  SKIN: No rashes or lesions    ASSESSMENT & PLAN        #L HIP FRACTURE (UNOFFICIALLY DX PENDING OFFICIAL RADIOLOGY READ)  - Post op D1  -  pain control  -  dvt ppx  -PT/ rehab        #Systolic murmur  - asymptomatic  -  check tte    #HTN  -On amlodipine 2.5 mg daily at home  - On hold currently

## 2019-01-20 NOTE — PHYSICAL THERAPY INITIAL EVALUATION ADULT - PLANNED THERAPY INTERVENTIONS, PT EVAL
stretching/gait training/bed mobility training/neuromuscular re-education/ROM/strengthening/transfer training/balance training

## 2019-01-20 NOTE — CONSULT NOTE ADULT - CONSULT REASON
CC:  Closed comminuted intertrochanteric fracture of left femur, initial encounter  01/19/2019    Active  Tyrell Villalobos.  itial encounter  01/19/2019    Active  Tyrell Villalobos.  Femur fracture surgery  01/19/2019 CC:  Closed comminuted intertrochanteric fracture of left femur s/p left hip IM nailing on 01/19/2019

## 2019-01-20 NOTE — CONSULT NOTE ADULT - ASSESSMENT
IMPRESSION: Rehab for clsed comminuted intertrochanteric fracture of left femur s/p left hip IM nailing on 01/19/2019    PRECAUTIONS: [  ] Cardiac  [  ] Respiratory  [  ] Seizures [  ] Contact Isolation  [  ] Droplet Isolation  [  ] Other    Weight Bearing Status:  WBAT LLE    RECOMMENDATION:    Out of Bed to Chair     DVT/Decubiti Prophylaxis    REHAB PLAN:     [   ] Bedside P/T 3-5 times a week   [   ]   Bedside O/T  2-3 times a week             [   ] No Rehab Therapy Indicated                   [   ]  Speech Therapy   Conditioning/ROM                                    ADL  Bed Mobility                                               Conditioning/ROM  Transfers                                                     Bed Mobility  Sitting /Standing Balance                         Transfers                                        Gait Training                                               Sitting/Standing Balance  Stair Training [   ]Applicable                    Home equipment Eval                                                                        Splinting  [   ] Only      GOALS:   ADL   [   ]   Independent                    Transfers  [   ] Independent                          Ambulation  [   ] Independent     [    ] With device                            [   ]  CG                                                         [   ]  CG                                                                  [   ] CG                            [    ] Min A                                                   [   ] Min A                                                              [   ] Min  A          DISCHARGE PLAN:   [   ]  Good candidate for Intensive Rehabilitation/Hospital based-[facility]A [facility]                                             Will tolerate 3hrs Intensive Rehab Daily                                       [    ]  Short Term Rehab in Skilled Nursing Facility                                       [    ]  Home with Outpatient or  services                                         [    ]  Possible Candidate for Intensive Hospital based Rehab      Thank you. IMPRESSION: Rehab for clsed comminuted intertrochanteric fracture of left femur s/p left hip IM nailing on 01/19/2019    PRECAUTIONS: [  ] Cardiac  [  ] Respiratory  [  ] Seizures [  ] Contact Isolation  [  ] Droplet Isolation  [  ] Other    Weight Bearing Status:  WBAT LLE    RECOMMENDATION:    Out of Bed to Chair     DVT/Decubiti Prophylaxis    REHAB PLAN:     [ XX  ] Bedside P/T 3-5 times a week   [   ]   Bedside O/T  2-3 times a week             [   ] No Rehab Therapy Indicated                   [   ]  Speech Therapy   Conditioning/ROM                                    ADL  Bed Mobility                                               Conditioning/ROM  Transfers                                                     Bed Mobility  Sitting /Standing Balance                         Transfers                                        Gait Training                                               Sitting/Standing Balance  Stair Training [ X  ]Applicable                    Home equipment Eval                                                                        Splinting  [   ] Only      GOALS:   ADL   [ X  ]   Independent                    Transfers  [ X  ] Independent                          Ambulation  [ X  ] Independent     [  X  ] With device                            [   ]  CG                                                         [   ]  CG                                                                  [   ] CG                            [    ] Min A                                                   [   ] Min A                                                              [   ] Min  A          DISCHARGE PLAN:   [ X  ]  Good candidate for Intensive Rehabilitation/Hospital based-[facility]A [facility]                                             Will tolerate 3hrs Intensive Rehab Daily                                       [    ]  Short Term Rehab in Skilled Nursing Facility                                       [    ]  Home with Outpatient or  services                                         [    ]  Possible Candidate for Intensive Hospital based Rehab      Thank you.

## 2019-01-20 NOTE — PHYSICAL THERAPY INITIAL EVALUATION ADULT - CRITERIA FOR SKILLED THERAPEUTIC INTERVENTIONS
predicted duration of therapy intervention/impairments found/functional limitations in following categories/therapy frequency/anticipated discharge recommendation/rehab potential/anticipated equipment needs at discharge

## 2019-01-20 NOTE — PROGRESS NOTE ADULT - SUBJECTIVE AND OBJECTIVE BOX
Patient was seen and examined. Spoke with RN. Chart reviewed.  No events overnight.  Vital Signs Last 24 Hrs  T(F): 97.5 (20 Jan 2019 07:20), Max: 98.9 (19 Jan 2019 11:24)  HR: 71 (20 Jan 2019 07:20) (62 - 90)  BP: 138/64 (20 Jan 2019 07:20) (115/56 - 172/74)  SpO2: 97% (19 Jan 2019 13:09) (97% - 100%)  MEDICATIONS  (STANDING):  acetaminophen   Tablet .. 650 milliGRAM(s) Oral every 6 hours  ascorbic acid 500 milliGRAM(s) Oral two times a day  aspirin enteric coated 325 milliGRAM(s) Oral two times a day  calcium carbonate 1250 mG  + Vitamin D (OsCal 500 + D) 1 Tablet(s) Oral three times a day  docusate sodium 100 milliGRAM(s) Oral three times a day  ferrous    sulfate 325 milliGRAM(s) Oral three times a day with meals  folic acid 1 milliGRAM(s) Oral daily  multivitamin 1 Tablet(s) Oral daily  polyethylene glycol 3350 17 Gram(s) Oral daily    MEDICATIONS  (PRN):  aluminum hydroxide/magnesium hydroxide/simethicone Suspension 30 milliLiter(s) Oral four times a day PRN Indigestion  magnesium hydroxide Suspension 30 milliLiter(s) Oral daily PRN Constipation  morphine  - Injectable 2 milliGRAM(s) IV Push every 3 hours PRN Severe Pain (7 - 10)  ondansetron Injectable 4 milliGRAM(s) IV Push every 6 hours PRN Nausea and/or Vomiting  oxyCODONE    IR 5 milliGRAM(s) Oral every 4 hours PRN Mild Pain (1 - 3)  senna 2 Tablet(s) Oral at bedtime PRN Constipation    Labs:                        9.3    11.94 )-----------( 166      ( 19 Jan 2019 11:43 )             28.6                         8.4    9.13  )-----------( 186      ( 19 Jan 2019 05:51 )             25.9     19 Jan 2019 11:47    136    |  102    |  28     ----------------------------<  151    5.3     |  18     |  1.0    19 Jan 2019 05:51    137    |  100    |  31     ----------------------------<  122    4.9     |  23     |  0.9      Ca    7.8        19 Jan 2019 11:47  Ca    8.3        19 Jan 2019 05:51            General: comfortable, NAD  Neurology: nonfocal  Head:  Normocephalic, atraumatic  ENT:  Mucosa moist, no ulcerations  Neck:  Supple, no JVD,   Resp: CTA B/L  CV: RRR, S1S2,   GI: Soft, NT, bowel sounds  MS: No edema, + peripheral pulses,       A/P:  #L HIP FRACTURE  - Post op   -  pain control  -PT/ rehab  OOB to chair    ortho f/u    DVT prophylaxis  Decubitus prevention- all measures as per RN protocol  Please call or text me with any questions or updates

## 2019-01-21 NOTE — PROGRESS NOTE ADULT - SUBJECTIVE AND OBJECTIVE BOX
SUBJECTIVE:    Patient is a 89y old Female who presents with a chief complaint of L HIP FRACTURE S/P FALL;  SCALP LACERATION (21 Jan 2019 07:59)    Currently admitted to medicine with the primary diagnosis of Intertrochanteric fracture of left hip     Today is hospital day 4d.   OVERNIGHT EVENTS no acute events  Today, patient denies any pain, sob, or any discomfort. Hgb 6.7 on AM lab today. Will transfuse 1U prbc, repeat lab at 4pm. Hyperkalemia, will get ekg, and monitor.    PAST MEDICAL & SURGICAL HISTORY  HTN (hypertension)  No significant past surgical history          ALLERGIES:  No Known Allergies    MEDICATIONS:  STANDING MEDICATIONS  acetaminophen   Tablet .. 650 milliGRAM(s) Oral every 6 hours  ascorbic acid 500 milliGRAM(s) Oral two times a day  aspirin enteric coated 325 milliGRAM(s) Oral two times a day  calcium carbonate 1250 mG  + Vitamin D (OsCal 500 + D) 1 Tablet(s) Oral three times a day  docusate sodium 100 milliGRAM(s) Oral three times a day  ferrous    sulfate 325 milliGRAM(s) Oral three times a day with meals  folic acid 1 milliGRAM(s) Oral daily  latanoprost 0.005% Ophthalmic Solution 1 Drop(s) Both EYES at bedtime  multivitamin 1 Tablet(s) Oral daily  polyethylene glycol 3350 17 Gram(s) Oral daily    PRN MEDICATIONS  aluminum hydroxide/magnesium hydroxide/simethicone Suspension 30 milliLiter(s) Oral four times a day PRN  bisacodyl Suppository 10 milliGRAM(s) Rectal daily PRN  magnesium hydroxide Suspension 30 milliLiter(s) Oral daily PRN  morphine  - Injectable 2 milliGRAM(s) IV Push every 3 hours PRN  ondansetron Injectable 4 milliGRAM(s) IV Push every 6 hours PRN  oxyCODONE    IR 5 milliGRAM(s) Oral every 4 hours PRN  senna 2 Tablet(s) Oral at bedtime PRN    VITALS:   T(F): 96.5  HR: 106  BP: 112/56  RR: 18  SpO2: --          LABS:                        6.7    14.74 )-----------( 202      ( 21 Jan 2019 07:04 )             20.3     01-21    134<L>  |  100  |  57<H>  ----------------------------<  122<H>  5.6<H>   |  26  |  1.6<H>    Ca    9.8      21 Jan 2019 07:04                    RADIOLOGY:    PHYSICAL EXAM:  GEN: NAD  LUNGS: CTAB  HEART: Tachycardic s1s2 present   ABD: soft nontender nondistended +BS  EXT: no edema

## 2019-01-21 NOTE — PROGRESS NOTE ADULT - SUBJECTIVE AND OBJECTIVE BOX
Patient was seen and examined. Spoke with RN. Chart reviewed.  No events overnight.  Vital Signs Last 24 Hrs  T(F): 97 (20 Jan 2019 23:49), Max: 97 (20 Jan 2019 23:49)  HR: 88 (20 Jan 2019 23:49) (88 - 109)  BP: 117/59 (20 Jan 2019 23:49) (117/59 - 119/56)  SpO2: --  MEDICATIONS  (STANDING):  acetaminophen   Tablet .. 650 milliGRAM(s) Oral every 6 hours  ascorbic acid 500 milliGRAM(s) Oral two times a day  aspirin enteric coated 325 milliGRAM(s) Oral two times a day  calcium carbonate 1250 mG  + Vitamin D (OsCal 500 + D) 1 Tablet(s) Oral three times a day  docusate sodium 100 milliGRAM(s) Oral three times a day  ferrous    sulfate 325 milliGRAM(s) Oral three times a day with meals  folic acid 1 milliGRAM(s) Oral daily  latanoprost 0.005% Ophthalmic Solution 1 Drop(s) Both EYES at bedtime  multivitamin 1 Tablet(s) Oral daily  polyethylene glycol 3350 17 Gram(s) Oral daily    MEDICATIONS  (PRN):  aluminum hydroxide/magnesium hydroxide/simethicone Suspension 30 milliLiter(s) Oral four times a day PRN Indigestion  bisacodyl Suppository 10 milliGRAM(s) Rectal daily PRN If no bowel movement by POD#2  magnesium hydroxide Suspension 30 milliLiter(s) Oral daily PRN Constipation  morphine  - Injectable 2 milliGRAM(s) IV Push every 3 hours PRN Severe Pain (7 - 10)  ondansetron Injectable 4 milliGRAM(s) IV Push every 6 hours PRN Nausea and/or Vomiting  oxyCODONE    IR 5 milliGRAM(s) Oral every 4 hours PRN Mild Pain (1 - 3)  senna 2 Tablet(s) Oral at bedtime PRN Constipation    Labs:                        6.7    14.74 )-----------( 202      ( 21 Jan 2019 07:04 )             20.3                         7.9    16.16 )-----------( 303      ( 20 Jan 2019 11:55 )             24.4     20 Jan 2019 11:55    136    |  99     |  48     ----------------------------<  253    5.8     |  21     |  1.8    19 Jan 2019 11:47    136    |  102    |  28     ----------------------------<  151    5.3     |  18     |  1.0      Ca    8.7        20 Jan 2019 11:55  Ca    7.8        19 Jan 2019 11:47            General: comfortable, NAD  Neurology:  nonfocal  Head:  Normocephalic, atraumatic  ENT:  Mucosa moist, no ulcerations  Neck:  Supple, no JVD,   Resp: CTA B/L  CV: RRR, S1S2,   GI: Soft, NT, bowel sounds  MS: No edema, + peripheral pulses,      A/P:  89 yo F s/p L hip IMN  - pain control  - WBAT LLE  - PT/OOB  - wound care, dressing changes per ortho  - Ortho following  - Dispo planning /SW  DVT prophylaxis  Decubitus prevention- all measures as per RN protocol  Please call or text me with any questions or updates Patient was seen and examined. Spoke with RN. Chart reviewed.  No events overnight.  Vital Signs Last 24 Hrs  T(F): 97 (20 Jan 2019 23:49), Max: 97 (20 Jan 2019 23:49)  HR: 88 (20 Jan 2019 23:49) (88 - 109)  BP: 117/59 (20 Jan 2019 23:49) (117/59 - 119/56)  SpO2: --  MEDICATIONS  (STANDING):  acetaminophen   Tablet .. 650 milliGRAM(s) Oral every 6 hours  ascorbic acid 500 milliGRAM(s) Oral two times a day  aspirin enteric coated 325 milliGRAM(s) Oral two times a day  calcium carbonate 1250 mG  + Vitamin D (OsCal 500 + D) 1 Tablet(s) Oral three times a day  docusate sodium 100 milliGRAM(s) Oral three times a day  ferrous    sulfate 325 milliGRAM(s) Oral three times a day with meals  folic acid 1 milliGRAM(s) Oral daily  latanoprost 0.005% Ophthalmic Solution 1 Drop(s) Both EYES at bedtime  multivitamin 1 Tablet(s) Oral daily  polyethylene glycol 3350 17 Gram(s) Oral daily    MEDICATIONS  (PRN):  aluminum hydroxide/magnesium hydroxide/simethicone Suspension 30 milliLiter(s) Oral four times a day PRN Indigestion  bisacodyl Suppository 10 milliGRAM(s) Rectal daily PRN If no bowel movement by POD#2  magnesium hydroxide Suspension 30 milliLiter(s) Oral daily PRN Constipation  morphine  - Injectable 2 milliGRAM(s) IV Push every 3 hours PRN Severe Pain (7 - 10)  ondansetron Injectable 4 milliGRAM(s) IV Push every 6 hours PRN Nausea and/or Vomiting  oxyCODONE    IR 5 milliGRAM(s) Oral every 4 hours PRN Mild Pain (1 - 3)  senna 2 Tablet(s) Oral at bedtime PRN Constipation    Labs:                        6.7    14.74 )-----------( 202      ( 21 Jan 2019 07:04 )             20.3                         7.9    16.16 )-----------( 303      ( 20 Jan 2019 11:55 )             24.4     20 Jan 2019 11:55    136    |  99     |  48     ----------------------------<  253    5.8     |  21     |  1.8    19 Jan 2019 11:47    136    |  102    |  28     ----------------------------<  151    5.3     |  18     |  1.0      Ca    8.7        20 Jan 2019 11:55  Ca    7.8        19 Jan 2019 11:47            General: comfortable, NAD  Neurology:  nonfocal  Head:  Normocephalic, atraumatic  ENT:  Mucosa moist, no ulcerations  Neck:  Supple, no JVD,   Resp: CTA B/L  CV: RRR, S1S2,   GI: Soft, NT, bowel sounds  MS: No edema, + peripheral pulses,      A/P:  89 yo F s/p L hip IMN  - pain control  - WBAT LLE  - PT/OOB  - wound care, dressing changes per ortho  - Ortho following- ?source of anemia   - check repeat CBC, type and cross  - transfuse 2 units if under 7  monitor closely  - Dispo planning /SW when CBC stable  DVT prophylaxis  Decubitus prevention- all measures as per RN protocol  Please call or text me with any questions or updates

## 2019-01-21 NOTE — PROGRESS NOTE ADULT - SUBJECTIVE AND OBJECTIVE BOX
NEPHROLOGY FOLLOW UP NOTE    pt seen and examined  receiving prbc transfusion  cr and k up post-op  + fair urine output    PAST MEDICAL & SURGICAL HISTORY:  HTN (hypertension)  No significant past surgical history    Allergies:  No Known Allergies    Home Medications Reviewed    SOCIAL HISTORY:  Denies ETOH,Smoking,   FAMILY HISTORY:        REVIEW OF SYSTEMS:  All other review of systems is negative unless indicated above.    PHYSICAL EXAM:  NAD  awake and alert  moist mm  frail  no jvd  cta bl  rrr 2/6 murumur  soft, nt  no cvat  no cce                Hospital Medications:   MEDICATIONS  (STANDING):  acetaminophen   Tablet .. 650 milliGRAM(s) Oral every 6 hours  ascorbic acid 500 milliGRAM(s) Oral two times a day  aspirin enteric coated 325 milliGRAM(s) Oral two times a day  calcium carbonate 1250 mG  + Vitamin D (OsCal 500 + D) 1 Tablet(s) Oral three times a day  docusate sodium 100 milliGRAM(s) Oral three times a day  ferrous    sulfate 325 milliGRAM(s) Oral three times a day with meals  folic acid 1 milliGRAM(s) Oral daily  latanoprost 0.005% Ophthalmic Solution 1 Drop(s) Both EYES at bedtime  multivitamin 1 Tablet(s) Oral daily  polyethylene glycol 3350 17 Gram(s) Oral daily        VITALS:  T(F): 97.5 (01-21-19 @ 15:59), Max: 97.5 (01-21-19 @ 15:59)  HR: 88 (01-21-19 @ 15:59)  BP: 111/56 (01-21-19 @ 15:59)  RR: 18 (01-21-19 @ 15:59)  SpO2: --  Wt(kg): --    01-19 @ 07:01  -  01-20 @ 07:00  --------------------------------------------------------  IN: 750 mL / OUT: 0 mL / NET: 750 mL    01-20 @ 07:01  -  01-21 @ 07:00  --------------------------------------------------------  IN: 960 mL / OUT: 400 mL / NET: 560 mL          LABS:  01-21    134<L>  |  100  |  57<H>  ----------------------------<  122<H>  5.6<H>   |  26  |  1.6<H>    Ca    9.8      21 Jan 2019 07:04                            6.7    14.74 )-----------( 202      ( 21 Jan 2019 07:04 )             20.3       Urine Studies:        RADIOLOGY & ADDITIONAL STUDIES:

## 2019-01-21 NOTE — PROGRESS NOTE ADULT - SUBJECTIVE AND OBJECTIVE BOX
Patient seen and examined at bedside this AM. Doing well with pain controlled. Denies f/c. NNC    PE:  NAD  unlabored resp  LLE:  dressing c/d/i  thigh soft, compressible  SILT distally  Motor intact ehl/fhl/ta/gs  wwp, cr <2s    A/P: 89 yo F s/p L hip IMN  - pain control  - DVT ppx: Chem ppx, scd's  - WBAT LLE  - PT/OOB  - IS  - Care per primary  - Ortho following  - Dispo planning /SW

## 2019-01-22 NOTE — OCCUPATIONAL THERAPY INITIAL EVALUATION ADULT - PLANNED THERAPY INTERVENTIONS, OT EVAL
balance training/bed mobility training/strengthening/parent/caregiver training.../ADL retraining/transfer training

## 2019-01-22 NOTE — PROGRESS NOTE ADULT - SUBJECTIVE AND OBJECTIVE BOX
Patient was seen and examined. Spoke with RN. Chart reviewed.  No events overnight.  Vital Signs Last 24 Hrs  T(F): 96 (22 Jan 2019 07:35), Max: 97.5 (21 Jan 2019 15:59)  HR: 80 (22 Jan 2019 07:35) (80 - 88)  BP: 143/66 (22 Jan 2019 07:35) (111/56 - 143/66)  SpO2: --  MEDICATIONS  (STANDING):  ascorbic acid 500 milliGRAM(s) Oral two times a day  aspirin enteric coated 325 milliGRAM(s) Oral two times a day  calcium carbonate 1250 mG  + Vitamin D (OsCal 500 + D) 1 Tablet(s) Oral three times a day  docusate sodium 100 milliGRAM(s) Oral three times a day  ferrous    sulfate 325 milliGRAM(s) Oral three times a day with meals  folic acid 1 milliGRAM(s) Oral daily  latanoprost 0.005% Ophthalmic Solution 1 Drop(s) Both EYES at bedtime  multivitamin 1 Tablet(s) Oral daily  polyethylene glycol 3350 17 Gram(s) Oral daily    MEDICATIONS  (PRN):  aluminum hydroxide/magnesium hydroxide/simethicone Suspension 30 milliLiter(s) Oral four times a day PRN Indigestion  bisacodyl Suppository 10 milliGRAM(s) Rectal daily PRN If no bowel movement by POD#2  magnesium hydroxide Suspension 30 milliLiter(s) Oral daily PRN Constipation  morphine  - Injectable 2 milliGRAM(s) IV Push every 3 hours PRN Severe Pain (7 - 10)  ondansetron Injectable 4 milliGRAM(s) IV Push every 6 hours PRN Nausea and/or Vomiting  oxyCODONE    IR 5 milliGRAM(s) Oral every 4 hours PRN Mild Pain (1 - 3)  senna 2 Tablet(s) Oral at bedtime PRN Constipation    Labs:                        9.0    14.71 )-----------( 239      ( 22 Jan 2019 06:17 )             26.8                         9.8    17.22 )-----------( 294      ( 21 Jan 2019 18:53 )             28.8     21 Jan 2019 18:53    130    |  92     |  59     ----------------------------<  174    5.6     |  24     |  1.6    21 Jan 2019 07:04    134    |  100    |  57     ----------------------------<  122    5.6     |  26     |  1.6      Ca    10.5       21 Jan 2019 18:53  Ca    9.8        21 Jan 2019 07:04            General: comfortable, NAD  Neurology:  nonfocal  Head:  Normocephalic, atraumatic  ENT:  Mucosa moist, no ulcerations  Neck:  Supple, no JVD,   Skin: no breakdowns (as per RN)  Resp: CTA B/L  CV: RRR, S1S2,   GI: Soft, NT, bowel sounds  MS: No edema, + peripheral pulses      A/P:  89 yo F s/p L hip IMN  - pain control  - WBAT LLE  - PT/OOB  - wound care, dressing changes per ortho  - Ortho following- ?source of anemia   - CBC stable  -Cr stable; renal f/u  -PT/rehab  - Dispo planning   DVT prophylaxis  Decubitus prevention- all measures as per RN protocol  Please call or text me with any questions or updates

## 2019-01-22 NOTE — PROGRESS NOTE ADULT - SUBJECTIVE AND OBJECTIVE BOX
ORTHO PROGRESS NOTE       89yFemale POD #  3    S/P orif left hip     Patient seen and examined at bedside . The patient is awake and alert in NAD. No complaints of chest pain, SOB, N/V.    Vital Signs Last 24 Hrs  T(C): 35.6 (22 Jan 2019 07:35), Max: 36.4 (21 Jan 2019 15:59)  T(F): 96 (22 Jan 2019 07:35), Max: 97.5 (21 Jan 2019 15:59)  HR: 80 (22 Jan 2019 07:35) (80 - 88)  BP: 143/66 (22 Jan 2019 07:35) (111/56 - 143/66)  BP(mean): --  RR: 18 (22 Jan 2019 07:35) (18 - 18)  SpO2: --                          9.0    14.71 )-----------( 239      ( 22 Jan 2019 06:17 )             26.8     01-22    127<L>  |  92<L>  |  63<HH>  ----------------------------<  115<H>  5.2<H>   |  23  |  1.4    Ca    10.3<H>      22 Jan 2019 06:17    TPro  6.2  /  Alb  2.2<L>  /  TBili  1.0  /  DBili  x   /  AST  129<H>  /  ALT  51<H>  /  AlkPhos  146<H>  01-22          DVT ppx : asa     MEDICATIONS  (STANDING):  ascorbic acid 500 milliGRAM(s) Oral two times a day  aspirin enteric coated 325 milliGRAM(s) Oral two times a day  calcium carbonate 1250 mG  + Vitamin D (OsCal 500 + D) 1 Tablet(s) Oral three times a day  docusate sodium 100 milliGRAM(s) Oral three times a day  ferrous    sulfate 325 milliGRAM(s) Oral three times a day with meals  folic acid 1 milliGRAM(s) Oral daily  latanoprost 0.005% Ophthalmic Solution 1 Drop(s) Both EYES at bedtime  multivitamin 1 Tablet(s) Oral daily  polyethylene glycol 3350 17 Gram(s) Oral daily    MEDICATIONS  (PRN):  aluminum hydroxide/magnesium hydroxide/simethicone Suspension 30 milliLiter(s) Oral four times a day PRN Indigestion  bisacodyl Suppository 10 milliGRAM(s) Rectal daily PRN If no bowel movement by POD#2  magnesium hydroxide Suspension 30 milliLiter(s) Oral daily PRN Constipation  morphine  - Injectable 2 milliGRAM(s) IV Push every 3 hours PRN Severe Pain (7 - 10)  ondansetron Injectable 4 milliGRAM(s) IV Push every 6 hours PRN Nausea and/or Vomiting  oxyCODONE    IR 5 milliGRAM(s) Oral every 4 hours PRN Mild Pain (1 - 3)  senna 2 Tablet(s) Oral at bedtime PRN Constipation      PE:   left hip Dressing C/D/I          Compartments soft         NVI, SILT           A/P: 89yFemale POD # 3   S/P orif left hip             OOB to Chair            Physical Therapy           Pain control            Incentive Spirometry            DVT Prophylaxis            Discharge planning

## 2019-01-22 NOTE — OCCUPATIONAL THERAPY INITIAL EVALUATION ADULT - ADDITIONAL COMMENTS
Pt lives in  with ~[address], with family who provides 24/7 care. Pt reports she still cooks and performs self care ADLs with MIN/MOD A, PTA.

## 2019-01-22 NOTE — PROGRESS NOTE ADULT - SUBJECTIVE AND OBJECTIVE BOX
SUBJECTIVE:    Patient is a 89y old Female who presents with a chief complaint of L HIP FRACTURE S/P FALL;  SCALP LACERATION (22 Jan 2019 07:46)    Currently admitted to medicine with the primary diagnosis of Intertrochanteric fracture of left hip     Today is hospital day 5d.   OVERNIGHT EVENTS no acute events   Today, patient denies any sob, dizziness, or any pain.     PAST MEDICAL & SURGICAL HISTORY  HTN (hypertension)  No significant past surgical history          ALLERGIES:  No Known Allergies    MEDICATIONS:  STANDING MEDICATIONS  ascorbic acid 500 milliGRAM(s) Oral two times a day  aspirin enteric coated 325 milliGRAM(s) Oral two times a day  calcium carbonate 1250 mG  + Vitamin D (OsCal 500 + D) 1 Tablet(s) Oral three times a day  docusate sodium 100 milliGRAM(s) Oral three times a day  ferrous    sulfate 325 milliGRAM(s) Oral three times a day with meals  folic acid 1 milliGRAM(s) Oral daily  latanoprost 0.005% Ophthalmic Solution 1 Drop(s) Both EYES at bedtime  multivitamin 1 Tablet(s) Oral daily  polyethylene glycol 3350 17 Gram(s) Oral daily    PRN MEDICATIONS  aluminum hydroxide/magnesium hydroxide/simethicone Suspension 30 milliLiter(s) Oral four times a day PRN  bisacodyl Suppository 10 milliGRAM(s) Rectal daily PRN  magnesium hydroxide Suspension 30 milliLiter(s) Oral daily PRN  morphine  - Injectable 2 milliGRAM(s) IV Push every 3 hours PRN  ondansetron Injectable 4 milliGRAM(s) IV Push every 6 hours PRN  oxyCODONE    IR 5 milliGRAM(s) Oral every 4 hours PRN  senna 2 Tablet(s) Oral at bedtime PRN    VITALS:   T(F): 96  HR: 80  BP: 143/66  RR: 18  SpO2: --          LABS:                        9.0    14.71 )-----------( 239      ( 22 Jan 2019 06:17 )             26.8     01-22    127<L>  |  92<L>  |  63<HH>  ----------------------------<  115<H>  5.2<H>   |  23  |  1.4    Ca    10.3<H>      22 Jan 2019 06:17    TPro  6.2  /  Alb  2.2<L>  /  TBili  1.0  /  DBili  x   /  AST  129<H>  /  ALT  51<H>  /  AlkPhos  146<H>  01-22                  RADIOLOGY:    PHYSICAL EXAM:  GEN: NAD  LUNGS: CTAB  HEART: RRR s1s2 present, +systolic ejection murmur  ABD: soft nontender nondistended +BS  EXT: no edema

## 2019-01-22 NOTE — PROGRESS NOTE ADULT - SUBJECTIVE AND OBJECTIVE BOX
NEPHROLOGY FOLLOW UP NOTE    pt seen and examined  s/p prbc tx  pod 3  no cp, sob, fever    PAST MEDICAL & SURGICAL HISTORY:  HTN (hypertension)  No significant past surgical history    Allergies:  No Known Allergies    Home Medications Reviewed    SOCIAL HISTORY:  Denies ETOH,Smoking,   FAMILY HISTORY:        REVIEW OF SYSTEMS:  All other review of systems is negative unless indicated above.    PHYSICAL EXAM:  NAD  awake and alert  moist mm  frail  no jvd  cta bl  rrr 2/6 murum[place]  soft, nt  no cvat  no cce                Hospital Medications:   MEDICATIONS  (STANDING):  ascorbic acid 500 milliGRAM(s) Oral two times a day  aspirin enteric coated 325 milliGRAM(s) Oral two times a day  calcium carbonate 1250 mG  + Vitamin D (OsCal 500 + D) 1 Tablet(s) Oral three times a day  docusate sodium 100 milliGRAM(s) Oral three times a day  ferrous    sulfate 325 milliGRAM(s) Oral three times a day with meals  folic acid 1 milliGRAM(s) Oral daily  latanoprost 0.005% Ophthalmic Solution 1 Drop(s) Both EYES at bedtime  multivitamin 1 Tablet(s) Oral daily  polyethylene glycol 3350 17 Gram(s) Oral daily  sodium chloride 0.9%. 1000 milliLiter(s) (40 mL/Hr) IV Continuous <Continuous>        VITALS:  T(F): 96 (01-22-19 @ 16:00), Max: 97.1 (01-22-19 @ 00:00)  HR: 75 (01-22-19 @ 16:00)  BP: 137/62 (01-22-19 @ 16:00)  RR: 18 (01-22-19 @ 16:00)  SpO2: --  Wt(kg): --    01-20 @ 07:01  -  01-21 @ 07:00  --------------------------------------------------------  IN: 960 mL / OUT: 400 mL / NET: 560 mL    01-22 @ 07:01  -  01-22 @ 18:09  --------------------------------------------------------  IN: 480 mL / OUT: 500 mL / NET: -20 mL          LABS:  01-22    127<L>  |  92<L>  |  63<HH>  ----------------------------<  115<H>  5.2<H>   |  23  |  1.4    Ca    10.3<H>      22 Jan 2019 06:17    TPro  6.2  /  Alb  2.2<L>  /  TBili  1.0  /  DBili      /  AST  129<H>  /  ALT  51<H>  /  AlkPhos  146<H>  01-22                          9.0    14.71 )-----------( 239      ( 22 Jan 2019 06:17 )             26.8       Urine Studies:        RADIOLOGY & ADDITIONAL STUDIES:

## 2019-01-22 NOTE — OCCUPATIONAL THERAPY INITIAL EVALUATION ADULT - GENERAL OBSERVATIONS, REHAB EVAL
[id number]; Pt received seated in b/s chair, +IV lock, with daughter in law Rubia present, in NAD, agreeable to OT tx session.

## 2019-01-23 NOTE — PROGRESS NOTE ADULT - SUBJECTIVE AND OBJECTIVE BOX
SUBJECTIVE:    Patient is a 89y old Female who presents with a chief complaint of L HIP FRACTURE S/P FALL;  SCALP LACERATION (23 Jan 2019 08:20)    Currently admitted to medicine with the primary diagnosis of Intertrochanteric fracture of left hip     Today is hospital day 6d.   OVERNIGHT EVENTS no acute events   Today, patient denies any pain, dizziness or sob.    PAST MEDICAL & SURGICAL HISTORY  HTN (hypertension)  No significant past surgical history          ALLERGIES:  No Known Allergies    MEDICATIONS:  STANDING MEDICATIONS  ascorbic acid 500 milliGRAM(s) Oral two times a day  aspirin enteric coated 325 milliGRAM(s) Oral two times a day  calcium carbonate 1250 mG  + Vitamin D (OsCal 500 + D) 1 Tablet(s) Oral three times a day  chlorhexidine 4% Liquid 1 Application(s) Topical <User Schedule>  docusate sodium 100 milliGRAM(s) Oral three times a day  ferrous    sulfate 325 milliGRAM(s) Oral three times a day with meals  folic acid 1 milliGRAM(s) Oral daily  latanoprost 0.005% Ophthalmic Solution 1 Drop(s) Both EYES at bedtime  multivitamin 1 Tablet(s) Oral daily  polyethylene glycol 3350 17 Gram(s) Oral daily  sodium chloride 0.9%. 1000 milliLiter(s) IV Continuous <Continuous>    PRN MEDICATIONS  aluminum hydroxide/magnesium hydroxide/simethicone Suspension 30 milliLiter(s) Oral four times a day PRN  bisacodyl Suppository 10 milliGRAM(s) Rectal daily PRN  magnesium hydroxide Suspension 30 milliLiter(s) Oral daily PRN  morphine  - Injectable 2 milliGRAM(s) IV Push every 3 hours PRN  ondansetron Injectable 4 milliGRAM(s) IV Push every 6 hours PRN  oxyCODONE    IR 5 milliGRAM(s) Oral every 4 hours PRN  senna 2 Tablet(s) Oral at bedtime PRN    VITALS:   T(F): 96.1  HR: 81  BP: 118/56  RR: 18  SpO2: --          LABS:                        8.5    12.80 )-----------( 243      ( 23 Jan 2019 06:31 )             25.6     01-22    130<L>  |  92<L>  |  64<HH>  ----------------------------<  180<H>  5.8<H>   |  25  |  1.4    Ca    10.7<H>      22 Jan 2019 19:39    TPro  6.2  /  Alb  2.2<L>  /  TBili  1.0  /  DBili  x   /  AST  129<H>  /  ALT  51<H>  /  AlkPhos  146<H>  01-22                  RADIOLOGY:    PHYSICAL EXAM:  GEN: NAD  LUNGS: CTAB  HEART: RRR s1s2 present   ABD: soft nontender nondistended +BS  EXT: no edema

## 2019-01-23 NOTE — PROGRESS NOTE ADULT - SUBJECTIVE AND OBJECTIVE BOX
NEPHROLOGY FOLLOW UP NOTE    pt seen and examined  on gentle ivf  no new complaints  not a 4a candidate  no fever / cp / sob    PAST MEDICAL & SURGICAL HISTORY:  HTN (hypertension)  No significant past surgical history    Allergies:  No Known Allergies    Home Medications Reviewed    SOCIAL HISTORY:  Denies ETOH,Smoking,   FAMILY HISTORY:        REVIEW OF SYSTEMS:  All other review of systems is negative unless indicated above.    advance care planning and advance care directives reviewed and discussed in detail    PHYSICAL EXAM:  NAD  awake and alert  moist mm  frail  no jvd  cta bl  rrr 2/6 murumur  soft, nt  no cvat  no cce                Hospital Medications:   MEDICATIONS  (STANDING):  ascorbic acid 500 milliGRAM(s) Oral two times a day  aspirin enteric coated 325 milliGRAM(s) Oral two times a day  calcium carbonate 1250 mG  + Vitamin D (OsCal 500 + D) 1 Tablet(s) Oral three times a day  chlorhexidine 4% Liquid 1 Application(s) Topical <User Schedule>  docusate sodium 100 milliGRAM(s) Oral three times a day  ferrous    sulfate 325 milliGRAM(s) Oral three times a day with meals  folic acid 1 milliGRAM(s) Oral daily  latanoprost 0.005% Ophthalmic Solution 1 Drop(s) Both EYES at bedtime  multivitamin 1 Tablet(s) Oral daily  polyethylene glycol 3350 17 Gram(s) Oral daily  sodium chloride 0.9%. 1000 milliLiter(s) (40 mL/Hr) IV Continuous <Continuous>        VITALS:  T(F): 95.3 (01-23-19 @ 16:00), Max: 98.5 (01-23-19 @ 00:00)  HR: 81 (01-23-19 @ 16:00)  BP: 139/64 (01-23-19 @ 16:00)  RR: 16 (01-23-19 @ 16:00)  SpO2: --  Wt(kg): --    01-22 @ 07:01  -  01-23 @ 07:00  --------------------------------------------------------  IN: 480 mL / OUT: 1051 mL / NET: -571 mL    01-23 @ 07:01  -  01-23 @ 18:07  --------------------------------------------------------  IN: 570 mL / OUT: 0 mL / NET: 570 mL          LABS:  01-23    131<L>  |  96<L>  |  59<H>  ----------------------------<  119<H>  5.3<H>   |  23  |  1.2    Ca    10.7<H>      23 Jan 2019 06:31    TPro  6.2  /  Alb  2.2<L>  /  TBili  1.0  /  DBili      /  AST  129<H>  /  ALT  51<H>  /  AlkPhos  146<H>  01-22                          8.5    12.80 )-----------( 243      ( 23 Jan 2019 06:31 )             25.6       Urine Studies:        RADIOLOGY & ADDITIONAL STUDIES:

## 2019-01-23 NOTE — PROGRESS NOTE ADULT - SUBJECTIVE AND OBJECTIVE BOX
Patient was seen and examined. Spoke with RN. Chart reviewed.  No events overnight.  Vital Signs Last 24 Hrs  T(F): 96.1 (23 Jan 2019 07:32), Max: 98.5 (23 Jan 2019 00:00)  HR: 81 (23 Jan 2019 07:32) (75 - 92)  BP: 118/56 (23 Jan 2019 07:32) (118/56 - 138/66)  SpO2: --  MEDICATIONS  (STANDING):  ascorbic acid 500 milliGRAM(s) Oral two times a day  aspirin enteric coated 325 milliGRAM(s) Oral two times a day  calcium carbonate 1250 mG  + Vitamin D (OsCal 500 + D) 1 Tablet(s) Oral three times a day  chlorhexidine 4% Liquid 1 Application(s) Topical <User Schedule>  docusate sodium 100 milliGRAM(s) Oral three times a day  ferrous    sulfate 325 milliGRAM(s) Oral three times a day with meals  folic acid 1 milliGRAM(s) Oral daily  latanoprost 0.005% Ophthalmic Solution 1 Drop(s) Both EYES at bedtime  multivitamin 1 Tablet(s) Oral daily  polyethylene glycol 3350 17 Gram(s) Oral daily  sodium chloride 0.9%. 1000 milliLiter(s) (40 mL/Hr) IV Continuous <Continuous>    MEDICATIONS  (PRN):  aluminum hydroxide/magnesium hydroxide/simethicone Suspension 30 milliLiter(s) Oral four times a day PRN Indigestion  bisacodyl Suppository 10 milliGRAM(s) Rectal daily PRN If no bowel movement by POD#2  magnesium hydroxide Suspension 30 milliLiter(s) Oral daily PRN Constipation  morphine  - Injectable 2 milliGRAM(s) IV Push every 3 hours PRN Severe Pain (7 - 10)  ondansetron Injectable 4 milliGRAM(s) IV Push every 6 hours PRN Nausea and/or Vomiting  oxyCODONE    IR 5 milliGRAM(s) Oral every 4 hours PRN Mild Pain (1 - 3)  senna 2 Tablet(s) Oral at bedtime PRN Constipation    Labs:                        8.5    12.80 )-----------( 243      ( 23 Jan 2019 06:31 )             25.6                         9.3    13.23 )-----------( 277      ( 22 Jan 2019 19:39 )             27.0     22 Jan 2019 19:39    130    |  92     |  64     ----------------------------<  180    5.8     |  25     |  1.4    22 Jan 2019 06:17    127    |  92     |  63     ----------------------------<  115    5.2     |  23     |  1.4      Ca    10.7       22 Jan 2019 19:39  Ca    10.3       22 Jan 2019 06:17    TPro  6.2    /  Alb  2.2    /  TBili  1.0    /  DBili  x      /  AST  129    /  ALT  51     /  AlkPhos  146    22 Jan 2019 06:17          General: comfortable, NAD  Neurology: nonfocal  Head:  Normocephalic, atraumatic  ENT:  Mucosa moist, no ulcerations  Neck:  Supple, no JVD,   Skin: no breakdowns (as per RN)  Resp: CTA B/L  CV: RRR, S1S2,   GI: Soft, NT, bowel sounds  MS: No edema, + peripheral pulses,       A/P:  89 yo F s/p L hip IMN  - pain control  - WBAT LLE  - PT/OOB  - wound care, dressing changes per ortho  - Ortho following-     ?source of anemia   - repeat CBC ; check heme stool    -Na improved ; renal f/u appreciated  -PT/rehab  - Dispo planning when CBC stable  DVT prophylaxis  Decubitus prevention- all measures as per RN protocol  Please call or text me with any questions or updates

## 2019-01-24 NOTE — DISCHARGE NOTE ADULT - PATIENT PORTAL LINK FT
You can access the [facility] Patient Portal, offered by [facility], by registering with the following website: [email or web address]

## 2019-01-24 NOTE — DISCHARGE NOTE ADULT - PLAN OF CARE
Prevent recurrence Please continue pain control as needed with tylenol, and follow up with your primary care doctor as outpatient in 1-2 weeks.  Please participate in rehab. Maintenance Please check your BP at home, and continue taking amlodipine at home.  Follow up with your PCP as outpatient in 1-2 weeks.

## 2019-01-24 NOTE — PROGRESS NOTE ADULT - SUBJECTIVE AND OBJECTIVE BOX
NEPHROLOGY FOLLOW UP NOTE    pt seen and examined  d/w hospitalist  off ivf  cr better  no new complaints  no fever / cp / sob    PAST MEDICAL & SURGICAL HISTORY:  HTN (hypertension)  No significant past surgical history    Allergies:  No Known Allergies    Home Medications Reviewed    SOCIAL HISTORY:  Denies ETOH,Smoking,   FAMILY HISTORY:        REVIEW OF SYSTEMS:  All other review of systems is negative unless indicated above.      PHYSICAL EXAM:  NAD  awake and alert  moist mm  frail  no jvd  cta bl  rrr 2/6 murumur  soft, nt  no cvat  no cce              Hospital Medications:   MEDICATIONS  (STANDING):  ascorbic acid 500 milliGRAM(s) Oral two times a day  aspirin enteric coated 325 milliGRAM(s) Oral two times a day  calcium carbonate 1250 mG  + Vitamin D (OsCal 500 + D) 1 Tablet(s) Oral three times a day  chlorhexidine 4% Liquid 1 Application(s) Topical <User Schedule>  docusate sodium 100 milliGRAM(s) Oral three times a day  ferrous    sulfate 325 milliGRAM(s) Oral three times a day with meals  folic acid 1 milliGRAM(s) Oral daily  latanoprost 0.005% Ophthalmic Solution 1 Drop(s) Both EYES at bedtime  multivitamin 1 Tablet(s) Oral daily  polyethylene glycol 3350 17 Gram(s) Oral daily        VITALS:  T(F): 98 (01-24-19 @ 09:13), Max: 98 (01-24-19 @ 09:13)  HR: 97 (01-24-19 @ 09:13)  BP: 132/61 (01-24-19 @ 09:13)  RR: 18 (01-24-19 @ 09:13)  SpO2: --  Wt(kg): --    01-22 @ 07:01 - 01-23 @ 07:00  --------------------------------------------------------  IN: 480 mL / OUT: 1051 mL / NET: -571 mL    01-23 @ 07:01  - 01-24 @ 07:00  --------------------------------------------------------  IN: 1050 mL / OUT: 0 mL / NET: 1050 mL    01-24 @ 07:01  -  01-24 @ 15:56  --------------------------------------------------------  IN: 100 mL / OUT: 0 mL / NET: 100 mL      Height (cm): 149.86 (01-24 @ 11:24)    LABS:  01-24    134<L>  |  99  |  63<HH>  ----------------------------<  133<H>  4.8   |  23  |  1.0    Ca    10.0      24 Jan 2019 05:41  Mg     1.9     01-24                            8.3    10.87 )-----------( 242      ( 24 Jan 2019 05:41 )             25.3       Urine Studies:        RADIOLOGY & ADDITIONAL STUDIES:

## 2019-01-24 NOTE — DISCHARGE NOTE ADULT - CARE PROVIDER_API CALL
Johnny Morris (MD), Medicine  [address] [place] [address]  Phone: [phone]  Fax: [phone]

## 2019-01-24 NOTE — DIETITIAN INITIAL EVALUATION ADULT. - OTHER INFO
s/p fall found w/ L hip fx + scalp laceration. PT/rehab. candidate for 4A. d/c when hgb stable. hyper K monitoring. Nephro for lyjillian. Ortho.

## 2019-01-24 NOTE — DIETITIAN INITIAL EVALUATION ADULT. - DIET TYPE
with LOW potassium (however, this diet order is NOT active). pt reported eating only farina for breakfast and eating "fairly" per pt./DASH/TLC (sodium and cholesterol restricted diet)

## 2019-01-24 NOTE — DISCHARGE NOTE ADULT - CARE PLAN
Principal Discharge DX:	Intertrochanteric fracture of left hip  Goal:	Prevent recurrence  Assessment and plan of treatment:	Please continue pain control as needed with tylenol, and follow up with your primary care doctor as outpatient in 1-2 weeks.  Please participate in rehab. Principal Discharge DX:	Intertrochanteric fracture of left hip  Goal:	Prevent recurrence  Assessment and plan of treatment:	Please continue pain control as needed with tylenol, and follow up with your primary care doctor as outpatient in 1-2 weeks.  Please participate in rehab.  Secondary Diagnosis:	HTN (hypertension)  Goal:	Maintenance  Assessment and plan of treatment:	Please check your BP at home, and continue taking amlodipine at home.  Follow up with your PCP as outpatient in 1-2 weeks.

## 2019-01-24 NOTE — DIETITIAN INITIAL EVALUATION ADULT. - PT NOT SOURCE
LOS- pt appears alert and oriented. no edema. LBM 1/24 per EMR. Surgical incision. No oral issue per pt/other (specify)

## 2019-01-24 NOTE — DIETITIAN INITIAL EVALUATION ADULT. - MD RECOMMEND
other/Please order Ensure Enlive q12hr. Consider removing DASH/TLC and LOW K if sodium and K have stabilized. Currently pt's sodium is low.

## 2019-01-24 NOTE — DISCHARGE NOTE ADULT - HOSPITAL COURSE
89yo F with PMHx HTN p/w left hip pain and scalp lacerations after sustaining mechanical fall. Found to have L HIP FRACTURE on xray  s/p L hip IMN by ortho. CTH negative.   Postop course complicated by blood loss anemia (hgb 6.7), s/p 1 U prbc. Hgb have been stable after transfusion.   Pt also developed hyponatremia, hyperkalemia, and BENI and was seen by nephkhoi kirby w/ dehydration with prerenal azotemia. After gentle hydration with IVF, and holding norvasc, creatinine improved and hyponatremia and hyperkalemia improved as well.   PT/ rehab recommended STR at [facility]. Pt will continue rehab at [facility] and f/u as outpatient.

## 2019-01-24 NOTE — PROGRESS NOTE ADULT - SUBJECTIVE AND OBJECTIVE BOX
SUBJECTIVE:    Patient is a 89y old Female who presents with a chief complaint of L HIP FRACTURE S/P FALL;  SCALP LACERATION (23 Jan 2019 18:04)    Currently admitted to medicine with the primary diagnosis of Intertrochanteric fracture of left hip     Today is hospital day 7d.   OVERNIGHT EVENTS no acute events  Today, patient denies any pain, dizziness or sob.    PAST MEDICAL & SURGICAL HISTORY  HTN (hypertension)  No significant past surgical history          ALLERGIES:  No Known Allergies    MEDICATIONS:  STANDING MEDICATIONS  ascorbic acid 500 milliGRAM(s) Oral two times a day  aspirin enteric coated 325 milliGRAM(s) Oral two times a day  calcium carbonate 1250 mG  + Vitamin D (OsCal 500 + D) 1 Tablet(s) Oral three times a day  chlorhexidine 4% Liquid 1 Application(s) Topical <User Schedule>  docusate sodium 100 milliGRAM(s) Oral three times a day  ferrous    sulfate 325 milliGRAM(s) Oral three times a day with meals  folic acid 1 milliGRAM(s) Oral daily  latanoprost 0.005% Ophthalmic Solution 1 Drop(s) Both EYES at bedtime  multivitamin 1 Tablet(s) Oral daily  polyethylene glycol 3350 17 Gram(s) Oral daily    PRN MEDICATIONS  aluminum hydroxide/magnesium hydroxide/simethicone Suspension 30 milliLiter(s) Oral four times a day PRN  bisacodyl Suppository 10 milliGRAM(s) Rectal daily PRN  magnesium hydroxide Suspension 30 milliLiter(s) Oral daily PRN  morphine  - Injectable 2 milliGRAM(s) IV Push every 3 hours PRN  ondansetron Injectable 4 milliGRAM(s) IV Push every 6 hours PRN  oxyCODONE    IR 5 milliGRAM(s) Oral every 4 hours PRN  senna 2 Tablet(s) Oral at bedtime PRN    VITALS:   T(F): 98  HR: 97  BP: 132/61  RR: 18  SpO2: --          LABS:                        8.3    10.87 )-----------( 242      ( 24 Jan 2019 05:41 )             25.3     01-24    134<L>  |  99  |  63<HH>  ----------------------------<  133<H>  4.8   |  23  |  1.0    Ca    10.0      24 Jan 2019 05:41  Mg     1.9     01-24                    RADIOLOGY:    PHYSICAL EXAM:  GEN: NAD  LUNGS: CTAB  HEART: RRR s1s2 present +systolic ejection murmur  ABD: soft nontender nondistended +BS  EXT: no edema   NEURO: aao x3

## 2019-01-24 NOTE — PROGRESS NOTE ADULT - SUBJECTIVE AND OBJECTIVE BOX
Patient was seen and examined. Spoke with RN. Chart reviewed.    No events overnight.  Vital Signs Last 24 Hrs  T(F): 98 (24 Jan 2019 09:13), Max: 98 (24 Jan 2019 09:13)  HR: 97 (24 Jan 2019 09:13) (81 - 97)  BP: 132/61 (24 Jan 2019 09:13) (132/61 - 145/65)  SpO2: --  MEDICATIONS  (STANDING):  ascorbic acid 500 milliGRAM(s) Oral two times a day  aspirin enteric coated 325 milliGRAM(s) Oral two times a day  calcium carbonate 1250 mG  + Vitamin D (OsCal 500 + D) 1 Tablet(s) Oral three times a day  chlorhexidine 4% Liquid 1 Application(s) Topical <User Schedule>  docusate sodium 100 milliGRAM(s) Oral three times a day  ferrous    sulfate 325 milliGRAM(s) Oral three times a day with meals  folic acid 1 milliGRAM(s) Oral daily  latanoprost 0.005% Ophthalmic Solution 1 Drop(s) Both EYES at bedtime  multivitamin 1 Tablet(s) Oral daily  polyethylene glycol 3350 17 Gram(s) Oral daily    MEDICATIONS  (PRN):  aluminum hydroxide/magnesium hydroxide/simethicone Suspension 30 milliLiter(s) Oral four times a day PRN Indigestion  bisacodyl Suppository 10 milliGRAM(s) Rectal daily PRN If no bowel movement by POD#2  magnesium hydroxide Suspension 30 milliLiter(s) Oral daily PRN Constipation  morphine  - Injectable 2 milliGRAM(s) IV Push every 3 hours PRN Severe Pain (7 - 10)  ondansetron Injectable 4 milliGRAM(s) IV Push every 6 hours PRN Nausea and/or Vomiting  oxyCODONE    IR 5 milliGRAM(s) Oral every 4 hours PRN Mild Pain (1 - 3)  senna 2 Tablet(s) Oral at bedtime PRN Constipation    Labs:                        8.3    10.87 )-----------( 242      ( 24 Jan 2019 05:41 )             25.3                         8.5    12.80 )-----------( 243      ( 23 Jan 2019 06:31 )             25.6     24 Jan 2019 05:41    134    |  99     |  63     ----------------------------<  133    4.8     |  23     |  1.0    23 Jan 2019 06:31    131    |  96     |  59     ----------------------------<  119    5.3     |  23     |  1.2      Ca    10.0       24 Jan 2019 05:41  Ca    10.7       23 Jan 2019 06:31  Mg     1.9       24 Jan 2019 05:41              Radiology:    General: comfortable,   Head:  Normocephalic, atraumatic  ENT:  Mucosa moist, no ulcerations  Neck:  Supple, no JVD,   Skin: no breakdown  Resp: CTA B/L  CV: RRR, S1S2,   GI: Soft, NT, bowel sounds  MS: No edema, + peripheral pulses, FROM all 4 extremity

## 2019-01-24 NOTE — DISCHARGE NOTE ADULT - MEDICATION SUMMARY - MEDICATIONS TO TAKE
I will START or STAY ON the medications listed below when I get home from the hospital:    aspirin 325 mg oral delayed release tablet  -- 1 tab(s) by mouth 2 times a day  -- Indication: For prophylaxis    amLODIPine 2.5 mg oral tablet  -- 1 tab(s) by mouth once a day  -- Indication: For HTN (hypertension)    FeroSul 325 mg (65 mg elemental iron) oral tablet  -- 1 tab(s) by mouth once a day  -- Indication: For supplement    docusate sodium 100 mg oral capsule  -- 1 cap(s) by mouth 3 times a day  -- Indication: For constipation    senna oral tablet  -- 2 tab(s) by mouth once a day (at bedtime), As needed, Constipation  -- Indication: For constipation    latanoprost 0.005% ophthalmic solution  -- 1 drop(s) to each affected eye once a day (at bedtime)  -- Indication: For glaucoma    calcium-vitamin D 500 mg-200 intl units oral tablet  -- 1 tab(s) by mouth 3 times a day  -- Indication: For supplement    Multiple Vitamins oral tablet  -- 1 tab(s) by mouth once a day  -- Indication: For supplement    ascorbic acid 500 mg oral tablet  -- 1 tab(s) by mouth 2 times a day  -- Indication: For supplement    folic acid 1 mg oral tablet  -- 1 tab(s) by mouth once a day  -- Indication: For supplement

## 2019-01-25 NOTE — PROGRESS NOTE ADULT - SUBJECTIVE AND OBJECTIVE BOX
NEPHROLOGY FOLLOW UP NOTE    pt seen and examined  no overnight events  tolerating po  off ivf  no new complaints    PAST MEDICAL & SURGICAL HISTORY:  HTN (hypertension)  No significant past surgical history    Allergies:  No Known Allergies    Home Medications Reviewed    SOCIAL HISTORY:  Denies ETOH,Smoking,   FAMILY HISTORY:        REVIEW OF SYSTEMS:  All other review of systems is negative unless indicated above.      PHYSICAL EXAM:  NAD  awake and alert  moist mm  frail  no jvd  cta bl  rrr 2/6 murumur  soft, nt  no cvat  no cce    Hospital Medications:   MEDICATIONS  (STANDING):  ascorbic acid 500 milliGRAM(s) Oral two times a day  aspirin enteric coated 325 milliGRAM(s) Oral two times a day  calcium carbonate 1250 mG  + Vitamin D (OsCal 500 + D) 1 Tablet(s) Oral three times a day  chlorhexidine 4% Liquid 1 Application(s) Topical <User Schedule>  docusate sodium 100 milliGRAM(s) Oral three times a day  ferrous    sulfate 325 milliGRAM(s) Oral three times a day with meals  folic acid 1 milliGRAM(s) Oral daily  latanoprost 0.005% Ophthalmic Solution 1 Drop(s) Both EYES at bedtime  multivitamin 1 Tablet(s) Oral daily  polyethylene glycol 3350 17 Gram(s) Oral daily        VITALS:  T(F): 96.6 (01-25-19 @ 07:49), Max: 97.7 (01-25-19 @ 00:00)  HR: 77 (01-25-19 @ 07:49)  BP: 119/57 (01-25-19 @ 07:49)  RR: 18 (01-25-19 @ 07:49)  SpO2: --  Wt(kg): --    01-23 @ 07:01  -  01-24 @ 07:00  --------------------------------------------------------  IN: 1050 mL / OUT: 0 mL / NET: 1050 mL    01-24 @ 07:01  -  01-25 @ 07:00  --------------------------------------------------------  IN: 100 mL / OUT: 0 mL / NET: 100 mL    01-25 @ 07:01  -  01-25 @ 12:55  --------------------------------------------------------  IN: 120 mL / OUT: 0 mL / NET: 120 mL      Height (cm): 149.86 (01-25 @ 09:14)    LABS:  01-25    132<L>  |  97<L>  |  59<H>  ----------------------------<  108<H>  5.0   |  23  |  1.0    Ca    10.6<H>      25 Jan 2019 06:42  Mg     1.9     01-24                            8.8    11.21 )-----------( 250      ( 25 Jan 2019 06:42 )             27.0       Urine Studies:        RADIOLOGY & ADDITIONAL STUDIES:

## 2019-01-25 NOTE — PROGRESS NOTE ADULT - SUBJECTIVE AND OBJECTIVE BOX
SUBJECTIVE:    Patient is a 89y old Female who presents with a chief complaint of L HIP FRACTURE S/P FALL;  SCALP LACERATION (25 Jan 2019 09:14)    Currently admitted to medicine with the primary diagnosis of Intertrochanteric fracture of left hip     Today is hospital day 8d.   OVERNIGHT EVENTS no acute events   Today, patient denies any pain, sob, or dizziness.     PAST MEDICAL & SURGICAL HISTORY  HTN (hypertension)  No significant past surgical history          ALLERGIES:  No Known Allergies    MEDICATIONS:  STANDING MEDICATIONS  ascorbic acid 500 milliGRAM(s) Oral two times a day  aspirin enteric coated 325 milliGRAM(s) Oral two times a day  calcium carbonate 1250 mG  + Vitamin D (OsCal 500 + D) 1 Tablet(s) Oral three times a day  chlorhexidine 4% Liquid 1 Application(s) Topical <User Schedule>  docusate sodium 100 milliGRAM(s) Oral three times a day  ferrous    sulfate 325 milliGRAM(s) Oral three times a day with meals  folic acid 1 milliGRAM(s) Oral daily  latanoprost 0.005% Ophthalmic Solution 1 Drop(s) Both EYES at bedtime  multivitamin 1 Tablet(s) Oral daily  polyethylene glycol 3350 17 Gram(s) Oral daily    PRN MEDICATIONS  aluminum hydroxide/magnesium hydroxide/simethicone Suspension 30 milliLiter(s) Oral four times a day PRN  bisacodyl Suppository 10 milliGRAM(s) Rectal daily PRN  magnesium hydroxide Suspension 30 milliLiter(s) Oral daily PRN  morphine  - Injectable 2 milliGRAM(s) IV Push every 3 hours PRN  ondansetron Injectable 4 milliGRAM(s) IV Push every 6 hours PRN  oxyCODONE    IR 5 milliGRAM(s) Oral every 4 hours PRN  senna 2 Tablet(s) Oral at bedtime PRN    VITALS:   T(F): 96.6  HR: 77  BP: 119/57  RR: 18  SpO2: --          LABS:                        8.8    11.21 )-----------( 250      ( 25 Jan 2019 06:42 )             27.0     01-25    132<L>  |  97<L>  |  59<H>  ----------------------------<  108<H>  5.0   |  23  |  1.0    Ca    10.6<H>      25 Jan 2019 06:42  Mg     1.9     01-24                    RADIOLOGY:    PHYSICAL EXAM:  GEN: NAD  LUNGS: CTAB  HEART: RRR s1s2 present +systolic ejection murmur  ABD: soft nontender nondistended +BS  EXT: no edema   NEURO: aao x3

## 2019-01-25 NOTE — PROGRESS NOTE ADULT - SUBJECTIVE AND OBJECTIVE BOX
SUBJECTIVE:    Patient is a 89y old Female who presents with a chief complaint of L HIP FRACTURE S/P FALL;  SCALP LACERATION (24 Jan 2019 15:55)    Currently admitted to medicine with the primary diagnosis of Intertrochanteric fracture of left hip     Today is hospital day 8d. This morning she is resting comfortably in bed and reports no new issues or overnight events.     PAST MEDICAL & SURGICAL HISTORY  HTN (hypertension)  No significant past surgical history    SOCIAL HISTORY:  Negative for smoking/alcohol/drug use.     ALLERGIES:  No Known Allergies    MEDICATIONS:  STANDING MEDICATIONS  ascorbic acid 500 milliGRAM(s) Oral two times a day  aspirin enteric coated 325 milliGRAM(s) Oral two times a day  calcium carbonate 1250 mG  + Vitamin D (OsCal 500 + D) 1 Tablet(s) Oral three times a day  chlorhexidine 4% Liquid 1 Application(s) Topical <User Schedule>  docusate sodium 100 milliGRAM(s) Oral three times a day  ferrous    sulfate 325 milliGRAM(s) Oral three times a day with meals  folic acid 1 milliGRAM(s) Oral daily  latanoprost 0.005% Ophthalmic Solution 1 Drop(s) Both EYES at bedtime  multivitamin 1 Tablet(s) Oral daily  polyethylene glycol 3350 17 Gram(s) Oral daily    PRN MEDICATIONS  aluminum hydroxide/magnesium hydroxide/simethicone Suspension 30 milliLiter(s) Oral four times a day PRN  bisacodyl Suppository 10 milliGRAM(s) Rectal daily PRN  magnesium hydroxide Suspension 30 milliLiter(s) Oral daily PRN  morphine  - Injectable 2 milliGRAM(s) IV Push every 3 hours PRN  ondansetron Injectable 4 milliGRAM(s) IV Push every 6 hours PRN  oxyCODONE    IR 5 milliGRAM(s) Oral every 4 hours PRN  senna 2 Tablet(s) Oral at bedtime PRN    VITALS:   T(F): 96.6  HR: 77  BP: 119/57  RR: 18  SpO2: --    LABS:                        8.8    11.21 )-----------( 250      ( 25 Jan 2019 06:42 )             27.0     01-25    132<L>  |  97<L>  |  59<H>  ----------------------------<  108<H>  5.0   |  23  |  1.0    Ca    10.6<H>      25 Jan 2019 06:42  Mg     1.9     01-24                    RADIOLOGY:    PHYSICAL EXAM:  GEN: No acute distress  LUNGS: Clear to auscultation bilaterally   HEART: Regular  ABD: Soft, non-tender, non-distended.  EXT: NC/NC/NE/2+PP/HEAD/Skin Intact.   NEURO: AAOX3    Intravenous access:   NG tube:   Short Catheter:

## 2019-01-26 NOTE — PROGRESS NOTE ADULT - SUBJECTIVE AND OBJECTIVE BOX
NEPHROLOGY FOLLOW UP NOTE    pt seen and examined  no overnight events  tolerating po  off ivf  no new complaints  d/w team    PAST MEDICAL & SURGICAL HISTORY:  HTN (hypertension)  No significant past surgical history    Allergies:  No Known Allergies    Home Medications Reviewed    SOCIAL HISTORY:  Denies ETOH,Smoking,   FAMILY HISTORY:        REVIEW OF SYSTEMS:  All other review of systems is negative unless indicated above.      PHYSICAL EXAM:  NAD  awake and alert  moist mm  frail  no jvd  cta bl  rrr 2/6 mur[place]  soft, nt  no cvat  no cce    Hospital Medications:   MEDICATIONS  (STANDING):  ascorbic acid 500 milliGRAM(s) Oral two times a day  aspirin enteric coated 325 milliGRAM(s) Oral two times a day  calcium carbonate 1250 mG  + Vitamin D (OsCal 500 + D) 1 Tablet(s) Oral three times a day  chlorhexidine 4% Liquid 1 Application(s) Topical <User Schedule>  docusate sodium 100 milliGRAM(s) Oral three times a day  ferrous    sulfate 325 milliGRAM(s) Oral three times a day with meals  folic acid 1 milliGRAM(s) Oral daily  latanoprost 0.005% Ophthalmic Solution 1 Drop(s) Both EYES at bedtime  multivitamin 1 Tablet(s) Oral daily  polyethylene glycol 3350 17 Gram(s) Oral daily        VITALS:  T(F): 96 (01-26-19 @ 07:20), Max: 97 (01-26-19 @ 00:00)  HR: 72 (01-26-19 @ 07:20)  BP: 139/65 (01-26-19 @ 07:20)  RR: 18 (01-26-19 @ 07:20)  SpO2: --  Wt(kg): --    01-24 @ 07:01  -  01-25 @ 07:00  --------------------------------------------------------  IN: 100 mL / OUT: 0 mL / NET: 100 mL    01-25 @ 07:01  -  01-26 @ 07:00  --------------------------------------------------------  IN: 840 mL / OUT: 550 mL / NET: 290 mL      Height (cm): 149.86 (01-26 @ 11:19)    LABS:  01-26    132<L>  |  97<L>  |  59<H>  ----------------------------<  124<H>  4.9   |  25  |  1.0    Ca    10.7<H>      26 Jan 2019 08:00                            8.7    10.87 )-----------( 259      ( 26 Jan 2019 08:00 )             27.3       Urine Studies:        RADIOLOGY & ADDITIONAL STUDIES:

## 2019-01-26 NOTE — PROGRESS NOTE ADULT - PROVIDER SPECIALTY LIST ADULT
Internal Medicine
Nephrology
Orthopedics
Internal Medicine
Orthopedics
Internal Medicine

## 2019-01-26 NOTE — PROGRESS NOTE ADULT - REASON FOR ADMISSION
L HIP FRACTURE S/P FALL;  SCALP LACERATION

## 2019-01-26 NOTE — PROGRESS NOTE ADULT - SUBJECTIVE AND OBJECTIVE BOX
SUBJECTIVE:    Patient is a 89y old Female who presents with a chief complaint of L HIP FRACTURE S/P FALL;  SCALP LACERATION (25 Jan 2019 12:54)    Currently admitted to medicine with the primary diagnosis of Intertrochanteric fracture of left hip     Today is hospital day 9d. This morning she is resting comfortably in bed and reports no new issues or overnight events.     PAST MEDICAL & SURGICAL HISTORY  HTN (hypertension)  No significant past surgical history    SOCIAL HISTORY:  Negative for smoking/alcohol/drug use.     ALLERGIES:  No Known Allergies    MEDICATIONS:  STANDING MEDICATIONS  ascorbic acid 500 milliGRAM(s) Oral two times a day  aspirin enteric coated 325 milliGRAM(s) Oral two times a day  calcium carbonate 1250 mG  + Vitamin D (OsCal 500 + D) 1 Tablet(s) Oral three times a day  chlorhexidine 4% Liquid 1 Application(s) Topical <User Schedule>  docusate sodium 100 milliGRAM(s) Oral three times a day  ferrous    sulfate 325 milliGRAM(s) Oral three times a day with meals  folic acid 1 milliGRAM(s) Oral daily  latanoprost 0.005% Ophthalmic Solution 1 Drop(s) Both EYES at bedtime  multivitamin 1 Tablet(s) Oral daily  polyethylene glycol 3350 17 Gram(s) Oral daily    PRN MEDICATIONS  aluminum hydroxide/magnesium hydroxide/simethicone Suspension 30 milliLiter(s) Oral four times a day PRN  bisacodyl Suppository 10 milliGRAM(s) Rectal daily PRN  magnesium hydroxide Suspension 30 milliLiter(s) Oral daily PRN  morphine  - Injectable 2 milliGRAM(s) IV Push every 3 hours PRN  ondansetron Injectable 4 milliGRAM(s) IV Push every 6 hours PRN  oxyCODONE    IR 5 milliGRAM(s) Oral every 4 hours PRN  senna 2 Tablet(s) Oral at bedtime PRN    VITALS:   T(F): 96  HR: 72  BP: 139/65  RR: 18  SpO2: --    LABS:                        8.7    10.87 )-----------( 259      ( 26 Jan 2019 08:00 )             27.3     01-26    132<L>  |  97<L>  |  59<H>  ----------------------------<  124<H>  4.9   |  25  |  1.0    Ca    10.7<H>      26 Jan 2019 08:00                    RADIOLOGY:    PHYSICAL EXAM:  GEN: No acute distress  LUNGS: Clear to auscultation bilaterally   HEART: Regular  ABD: Soft, non-tender, non-distended.  EXT: NC/NC/NE/2+PP/HEAD/Skin Intact.   NEURO: AAOX3    Intravenous access:   NG tube:   Short Catheter:

## 2019-01-26 NOTE — PROGRESS NOTE ADULT - ASSESSMENT
Patient seen and examined at bedside this AM. Doing well with pain controlled. Denies cp/sob.    Vital Signs Last 24 Hrs  T(C): 36.9 (23 Jan 2019 00:00), Max: 36.9 (23 Jan 2019 00:00)  T(F): 98.5 (23 Jan 2019 00:00), Max: 98.5 (23 Jan 2019 00:00)  HR: 92 (23 Jan 2019 00:00) (75 - 92)  BP: 138/66 (23 Jan 2019 00:00) (137/62 - 143/66)  BP(mean): 89 (22 Jan 2019 16:00) (89 - 89)  RR: 18 (23 Jan 2019 00:00) (18 - 18)  SpO2: --    PE:     NAD  unlabored resp  LLE:  left hip Dressing C/D/I   Compartments soft  NVID  SILT distally  wwp, cr <2s                          9.3    13.23 )-----------( 277      ( 22 Jan 2019 19:39 )             27.0       A/P: 89yFemale POD #4  S/P orif left hip             OOB to Chair            Physical Therapy           Pain control            Incentive Spirometry            DVT Prophylaxis            Discharge planning
#L HIP FRACTURE  s/p L hip IMN  - pain control  - dvt ppx  - OOBC  - PT/ rehab  - d/c planning- snf     #Anemia- most likely due to postop blood loss  - hgb today 8.3  - s/p 1U prbc  - keep active type and screen  - monitor cbc    #Hyperkalemia- resolved  - 4.8 K today  - monitor bmp    #Systolic murmur  - asymptomatic  - echo- EF 60%, moderate MVR, Severe mitral annular calcification. Mild tricuspid regurgitation. Mild aortic regurgitation.      #HTN  - On amlodipine 2.5 mg daily at home  - On hold currently
Patient seen and examined at bedside this AM. Doing well with pain controlled. Denies f/c. NNC    Vital Signs Last 24 Hrs  T(C): 36.4 (20 Jan 2019 07:20), Max: 37.2 (19 Jan 2019 11:24)  T(F): 97.5 (20 Jan 2019 07:20), Max: 98.9 (19 Jan 2019 11:24)  HR: 71 (20 Jan 2019 07:20) (62 - 90)  BP: 138/64 (20 Jan 2019 07:20) (115/56 - 172/74)  BP(mean): --  RR: 18 (20 Jan 2019 07:20) (15 - 22)  SpO2: 97% (19 Jan 2019 13:09) (97% - 100%)    PE:  NAD  unlabored resp  LLE:  dressing c/d/i  thigh soft, compressible  SILT distally  Motor intact ehl/fhl/ta/gs  wwp, cr <2s    h/h 9.3/28.6  am cbc pending    A/P: 89 yo F s/p L hip IMN  - pain control  - DVT ppx: Chem ppx, scd's  - f/u am labs  - WBAT LLE  - PT/OOB  - IS  - Care per primary  - Ortho following  - Dispo planning /SW
#L HIP FRACTURE  s/p L hip IMN  - pain control  - dvt ppx  - OOBC  - PT/ rehab  - d/c planning- snf     #Anemia-2/2 postop blood loss- improved  - hgb today 8.8  - s/p 1U prbc  - keep active type and screen  - monitor cbc    #Hyponatremia  #Hyperkalemia- resolved  - monitor bmp    #Systolic murmur  - asymptomatic  - echo- EF 60%, moderate MVR, Severe mitral annular calcification. Mild tricuspid regurgitation. Mild aortic regurgitation.    #HTN  - On amlodipine 2.5 mg daily at home  - On hold currently
#L HIP FRACTURE  s/p L hip IMN  - pain control  - dvt ppx  - OOBC  - PT/ rehab- candidate for inpatient rehab 4A    #Anemia- most likely due to postop blood loss  - hgb today 6.7  - will transfuse 1U prbc  - repeat cbc at 4pm  - keep active type and screen    #Hyperkalemia  - check EKG  - monitor bmp, repeat at 4 pm    #Systolic murmur  - asymptomatic  -  check tte- ordered    #HTN  -On amlodipine 2.5 mg daily at home  - On hold currently
#L HIP FRACTURE  s/p L hip IMN  - pain control  - dvt ppx  - OOBC  - PT/ rehab- candidate for inpatient rehab 4A    #Anemia- most likely due to postop blood loss  - hgb today 9  - s/p 1U prbc  - keep active type and screen    #Hyperkalemia  - monitor bmp    #Systolic murmur  - asymptomatic  - check tte- ordered    #HTN  - On amlodipine 2.5 mg daily at home  - On hold currently
#L HIP FRACTURE  s/p L hip IMN  - pain control  - dvt ppx  - OOBC  - PT/ rehab- candidate for inpatient rehab 4A  - d/c planning when hgb stable     #Anemia- most likely due to postop blood loss  - hgb today 8.3  - stool heme   - s/p 1U prbc  - keep active type and screen  - monitor cbc    #Hyperkalemia  - 5.8 K today  - monitor bmp    #Systolic murmur  - asymptomatic  - check tte- ordered    #HTN  - On amlodipine 2.5 mg daily at home  - On hold currently
BENI  hyperkalemia  hyponatremia  hypercalcemia  s/p mech fall  left hip it fx s/p orif  scalp lac  HTN  anemia    plan:    NS @ 40 cc/hr - dc tomorrow  holding norvasc  may need to dc oscal-d if ca worsens  low K+ diet  f/u am labs, recheck h/h  po iron  pt / rehab  f/u ortho  full code
BENI  hyperkalemia  hyponatremia  s/p mech fall  left hip it fx s/p orif  scalp lac  HTN  anemia    plan:    NS @ 40 cc/hr x 24 hours  holding norvasc  low K+ diet  f/u am labs  pt / rehab  f/u ortho
BENI  hyperkalemia  hyponatremia  s/p mech fall  left hip it fx s/p orif  scalp lac  HTN  anemia    plan:    off ivf  PRBC transfusion - expect Cr to improve with blood transfusion  hold oupt antihypertensives  low K+ diet  check urine studies and renal sono if cr worsens  will follow
BENI - better  electrolyte imbalance  s/p fall  left hip it fx s/p orif  scalp laceration  HTN  anemia    plan:    keep off bp meds  no need for daily labs  extra salt with diet  po iron / vit c  pt / rehab  f/u ortho  dc planning to SNF
BENI - better  hyperkalemia   hyponatremia   hypercalcemia  s/p mech fall  left hip it fx s/p orif  scalp lac  HTN  anemia    plan:    no need for antihypertensives  extra salt with diet  po iron / vit c  dc oscal d  pt / rehab  f/u ortho  dc planning
BENI - better  hyperkalemia   hyponatremia   hypercalcemia  s/p mech fall  left hip it fx s/p orif  scalp lac  HTN  anemia    plan:  ------------------------------------------------------------------------------------------------------------    no need for antihypertensives  extra salt with diet  po iron / vit c  dc oscal d  pt / rehab    discharge planning to rehab  f/u ortho  dc planning
BENI - better  hyperkalemia - resolved  hyponatremia - better  hypercalcemia - better  s/p mech fall  left hip it fx s/p orif  scalp lac  HTN  anemia    plan:    off ivf  holding norvasc  po iron / vit c  consider stool guaiac  pt / rehab  f/u ortho  dc planning
BENI - better  hyperkalemia - resolved  hyponatremia - better  hypercalcemia - better  s/p mech fall  left hip it fx s/p orif  scalp lac  HTN  anemia    plan:    off ivf  holding norvasc  po iron / vit c  consider stool guaiac  pt / rehab  f/u ortho  dc planning
· Assessment		  #L HIP FRACTURE  s/p L hip IMN  - pain control  - dvt ppx  - OOBC  - PT/ rehab  - d/c planning- snf     #Anemia- most likely due to postop blood loss  - hgb today 8.3  - s/p 1U prbc  - keep active type and screen  - monitor cbc    #Hyperkalemia- resolved  - 4.8 K today  - monitor bmp    #Systolic murmur  - asymptomatic  - echo- EF 60%, moderate MVR, Severe mitral annular calcification. Mild tricuspid regurgitation. Mild aortic regurgitation.      #HTN  - On amlodipine 2.5 mg daily at home  - On hold currently

## 2019-01-28 PROBLEM — I10 ESSENTIAL (PRIMARY) HYPERTENSION: Chronic | Status: ACTIVE | Noted: 2019-01-01

## 2019-02-25 NOTE — ED PROVIDER NOTE - PHYSICAL EXAMINATION
VITALS:  I have reviewed the initial vital signs.  GENERAL: Well-developed, well-nourished elderly female, in no acute distress.  HEENT: Sclera clear. EOMI, PERRLA. Mucous m  CARDIO: irregular rhythm, nl S1 and S2. No murmurs, rubs, or gallops. 2+ radial pulses bilaterally.  PULM: Normal effort. Rales in the bases b/l. No accessory muscle use. No wheezing.   EXTREMITIES: b/l lower extremities with 2+ pitting edema up to mid-calf. no erythema, warmth, or ttp.  GI: Abdomen soft and non-distended. Nontender in all four quadrants without rebound or guarding. No pulsatile masses.  : No CVA tenderness b/l.  SKIN: Warm, dry. No pallor or rashes. Capillary refill <2 seconds.  NEURO: Alert and oriented to person and place, at baseline per son at bedside. Speech clear. No gross motor or sensory deficits.

## 2019-02-25 NOTE — ED ADULT NURSE NOTE - NSIMPLEMENTINTERV_GEN_ALL_ED
Implemented All Fall with Harm Risk Interventions:  [place] to call system. Call bell, personal items and telephone within reach. Instruct patient to call for assistance. Room bathroom lighting operational. Non-slip footwear when patient is off stretcher. Physically safe environment: no spills, clutter or unnecessary equipment. Stretcher in lowest position, wheels locked, appropriate side rails in place. Provide visual cue, wrist band, yellow gown, etc. Monitor gait and stability. Monitor for mental status changes and reorient to person, place, and time. Review medications for side effects contributing to fall risk. Reinforce activity limits and safety measures with patient and family. Provide visual clues: red socks.

## 2019-02-25 NOTE — ED PROVIDER NOTE - CLINICAL SUMMARY MEDICAL DECISION MAKING FREE TEXT BOX
s/o to me. 89 y/o female presented for dyspnea. Found to be in new onset atrial fibrillation. Labs with hypokalemia, BENI. Tn 0.04. BNP 6000. D-dimer elevated.  Imp: New onset a-fib. R/O PE.  A/P: Rate is controlled. Requires anticoagulation. Will start heparin.  Concerned about PE. Given BENI would like to avoid IV contrast. Risks>benefits. Already receiving heparin for treatment of PE. Will order V/Q scan to assess for PE. Will admit to monitored setting. Will give gentle IV fluids and replace K. s/o to me. 89 y/o female presented for dyspnea. Found to be in new onset atrial fibrillation. Labs with hypokalemia, BENI. Rectal with brown stool, no gross blood. Tn 0.04. BNP 6000. D-dimer elevated.  Imp: New onset a-fib. R/O PE.  A/P: Rate is controlled. Requires anticoagulation. Will start heparin.  Concerned about PE. Given BENI would like to avoid IV contrast. Risks>benefits. Already receiving heparin for treatment of PE. Will order V/Q scan to assess for PE. Will admit to monitored setting. Will give gentle IV fluids and replace K.

## 2019-02-25 NOTE — ED ADULT NURSE NOTE - CHIEF COMPLAINT QUOTE
KAYLA from [facility] for difficulty breathing and bilateral lower extremity swelling since last night

## 2019-02-25 NOTE — ED PROVIDER NOTE - CARE PLAN
Principal Discharge DX:	Atrial fibrillation  Secondary Diagnosis:	Dyspnea  Secondary Diagnosis:	Elevated troponin  Secondary Diagnosis:	Hypokalemia Principal Discharge DX:	Atrial fibrillation  Secondary Diagnosis:	Dyspnea  Secondary Diagnosis:	Elevated troponin  Secondary Diagnosis:	Hypokalemia  Secondary Diagnosis:	BENI (acute kidney injury)

## 2019-02-25 NOTE — ED PROVIDER NOTE - PROGRESS NOTE DETAILS
Moved patient to critical care for continued care and evaluation and treatment.  dr. Miller aware and will continue plan of care. brown stool per rectum upon obtaining rectal temp (97.9), endorsed case to MAR And hospitalist EMIR Tipton

## 2019-02-25 NOTE — H&P ADULT - NSHPPHYSICALEXAM_GEN_ALL_CORE
GENERAL: NAD, well-groomed, well-developed  HEAD:  NCAT  EYES: EOMI, PERRL, conjunctiva clear  ENMT: No tonsillar erythema, exudates, or enlargement; Moist mucous membranes,  NECK: Supple, No JVD, Normal thyroid  NERVOUS SYSTEM: AAOX3  CHEST/LUNG: CTA b/l no w/r/r  HEART: +s1s2 RRR no m/g/r  ABDOMEN: soft, NT/ND (+) bs, no HSM  EXTREMITIES:  2+ Peripheral Pulses, No c/c. 2+ CYNDIE b/l  LYMPH: No lymphadenopathy noted  SKIN: No rashes or lesions GENERAL: NAD, well-groomed, well-developed  HEAD:  NCAT  EYES: EOMI, PERRL, conjunctiva clear  ENMT: No tonsillar erythema, exudates, or enlargement; Moist mucous membranes,  NECK: Supple, No JVD, Normal thyroid  NERVOUS SYSTEM: AAOX3  CHEST/LUNG: bibasilar rales  HEART: +s1s2 RRR no m/g/r  ABDOMEN: soft, NT/ND (+) bs, no HSM  EXTREMITIES:  2+ Peripheral Pulses, No c/c. 2+ CYNDIE b/l  LYMPH: No lymphadenopathy noted  SKIN: No rashes or lesions

## 2019-02-25 NOTE — ED PROVIDER NOTE - OBJECTIVE STATEMENT
89 year old female w hx of HTN, resident of [facility] s/p surgery for left hip fx in January presents to the ED with difficulty breathing. Patient has been in the nursing home for 1 month, over which time she has developed progressively worsening b/l lower extremity edema. Today patient seemed short of breath per nursing home staff and was hypotensive on her vitals, thus prompting them to send her in for evaluation. Pt denies any symptoms currently including chest pain, palpitations, shortness of breath, back pain, leg pain. No recent fevers/chills.

## 2019-02-25 NOTE — H&P ADULT - ATTENDING COMMENTS
89 year old lady with a PMHx of HTN presenting to the ED with difficulty breathing, found to be in new onset Afib in ED, with possible sepsis, possible CHf    Pt seen and examined in ED3-5    appears very comfortable , no complaints    [facility]    Chart reviewed- agree with above and initial plan    ID Dr Greene    cardiology  eval    ICU eval if change in status    decubitus prevention    DVT proph

## 2019-02-25 NOTE — H&P ADULT - NSHPREVIEWOFSYSTEMS_GEN_ALL_CORE
CONSTITUTIONAL: No fever, weight loss, or fatigue  EYES: No eye pain, visual disturbances, or discharge  ENMT:  No difficulty hearing, tinnitus, vertigo; No sinus or throat pain  NECK: No pain or stiffness  RESPIRATORY: No cough, wheezing, chills or hemoptysis; (-) shortness of breath  CARDIOVASCULAR: No chest pain, palpitations, dizziness. (+) leg swelling  GASTROINTESTINAL: No abdominal or epigastric pain. No nausea, vomiting, or hematemesis; No diarrhea or constipation. No melena or hematochezia.  GENITOURINARY: No dysuria, frequency, hematuria, or incontinence  NEUROLOGICAL: No headaches, memory loss, loss of strength, numbness, or tremors  SKIN: No itching, burning, rashes, or lesions   LYMPH NODES: No enlarged glands  ENDOCRINE: No heat or cold intolerance; No hair loss  MUSCULOSKELETAL: No joint pain or swelling; No muscle, back, or extremity pain  PSYCHIATRIC: No depression, anxiety, mood swings, or difficulty sleeping  HEME/LYMPH: No easy bruising, or bleeding gums  ALLERY AND IMMUNOLOGIC: No hives or eczema

## 2019-02-25 NOTE — H&P ADULT - ASSESSMENT
89 year old lady with a PMHx of HTN presenting to the ED with difficulty breathing, found to be in new onset Afib in ED    Sepsis 2/2 unknown source at this point.   -admit to medicine  -f/u cultures  -on cefepime for now  -follow CBC    New onset atrial fibrillation  -c/w heparin for now. Can ultimately switch to NoAC upon discharge  -f/u PTT  -currently rate controlled  -consider cardiology consult     HTN  -monitor blood pressures  -hold bp meds as pt has been hypotensive    Dispo  -from [facility]  -ambulates independtly with cane/walker  -anticipated discharge when medically stable    DVT ppx  CHG 4% daily and PRN  OOBTC  DASH/TLC   FULL CODE 89 year old lady with a PMHx of HTN presenting to the ED with difficulty breathing, found to be in new onset Afib in ED    Sepsis 2/2 unknown source at this point.   -admit to medicine  -f/u cultures  -on cefepime for now  -follow CBC    New onset atrial fibrillation  -CHADSVASC 4  -c/w heparin for now. Can ultimately switch to NoAC upon discharge  -f/u PTT  -currently rate controlled  -consider cardiology consult     HTN  -monitor blood pressures  -hold bp meds as pt has been hypotensive    Dispo  -from [facility]  -ambulates independently with cane/walker  -anticipated discharge when medically stable    DVT ppx  CHG 4% daily and PRN  OOBTC  DASH/TLC   FULL CODE 89 year old lady with a PMHx of HTN presenting to the ED with difficulty breathing, found to be in new onset Afib in ED    Sepsis 2/2 unknown source at this point.   -admit to medicine  -f/u cultures  -on cefepime for now  -CXR(-)  -follow CBC    CYNDIE 2/2 new onset heart failure?  -BNP 6349  -trop 0.04. f/u repeat and trend  -TTE(1/2019): EF 60%, moderate AS  -consider repeat TTE    New onset atrial fibrillation  -CHADSVASC 4  -c/w heparin for now. Can ultimately switch to NoAC upon discharge  -f/u PTT  -currently rate controlled  -consider cardiology consult     BENI on CKD  -likely prerenal due to hypotension  -baseline Cr 1.0 1/2019  -current Cr 1.6  -. no bleed per ED rectal exam. CBC stable, no s/s bleed  -monitor BMP    Hypokalemia  -K 2.5 on admission  -repleated  -f/u BMP 8pm    HTN  -monitor blood pressures  -hold bp meds as pt has been hypotensive    Dispo  -from [facility]  -ambulates independently with cane/walker  -anticipated discharge when medically stable    DVT ppx  CHG 4% daily and PRN  OOBTC  DASH/TLC   FULL CODE 89 year old lady with a PMHx of HTN presenting to the ED with difficulty breathing, found to be in new onset Afib in ED    Sepsis 2/2 unknown source at this point.   -admit to medicine  -f/u cultures  -on cefepime for now  -CXR(-)  -follow CBC    CYNDIE 2/2 new onset heart failure?  -BNP 6349  -trop 0.04. f/u repeat and trend  -TTE(1/2019): EF 60%, moderate AS  -consider repeat TTE    New onset atrial fibrillation  -CHADSVASC 4  -c/w heparin for now. Can ultimately switch to NoAC upon discharge  -f/u PTT  -currently rate controlled  -consider cardiology consult     r/o PE  -f/u V/Q scan     BENI on CKD  -likely prerenal due to hypotension  -baseline Cr 1.0 1/2019  -current Cr 1.6  -. no bleed per ED rectal exam. CBC stable, no s/s bleed  -monitor BMP    Hypokalemia  -K 2.5 on admission  -repleated  -f/u BMP 8pm    HTN  -monitor blood pressures  -hold bp meds as pt has been hypotensive    Dispo  -from [facility]  -ambulates independently with cane/walker  -anticipated discharge when medically stable    DVT ppx  CHG 4% daily and PRN  OOBTC  DASH/TLC   FULL CODE 89 year old lady with a PMHx of HTN presenting to the ED with difficulty breathing, found to be in new onset Afib in ED    Sepsis 2/2 unknown source at this point.   -admit to medicine  -f/u cultures  -on cefepime for now  -CXR(-)  -follow CBC    CYNDIE 2/2 new onset heart failure?  -BNP 6349  -trop 0.04. f/u repeat and trend  -TTE(1/2019): EF 60%, moderate AS  -consider repeat TTE    New onset atrial fibrillation  -CHADSVASC 4  -c/w heparin for now. Can ultimately switch to NoAC upon discharge  -f/u PTT  -currently rate controlled  -consider cardiology consult     r/o PE  -f/u V/Q scan   -f/u LE duplex    BENI on CKD  -likely prerenal due to hypotension  -baseline Cr 1.0 1/2019  -current Cr 1.6  -. no bleed per ED rectal exam. CBC stable, no s/s bleed  -monitor BMP  -f/u urine lytes  -consider nephrology consult    Hypokalemia  -K 2.5 on admission  -repleated  -f/u BMP 8pm    HTN  -monitor blood pressures  -hold bp meds as pt has been hypotensive    Dispo  -from [facility]  -ambulates independently with cane/walker  -anticipated discharge when medically stable    DVT ppx  CHG 4% daily and PRN  OOBTC  DASH/TLC   FULL CODE

## 2019-02-25 NOTE — ED ADULT TRIAGE NOTE - CHIEF COMPLAINT QUOTE
KAYLA from [facility] for difficulty breathing and bilateral lower extremity swelling since last night

## 2019-02-25 NOTE — H&P ADULT - HISTORY OF PRESENT ILLNESS
89 year old lady with a PMHx of HTN presenting to the ED with difficulty breathing. She was discharged to [facility] 1/2019 after a L hip fracture. As per the records from the nursing home she has become increasingly short of breath and her legs got more and more swollen.  Vitals at the nursing home showed that the patient was hypotensive in the 80s so they sent her to ED fo further evaluation. Denied HA, dizziness, cp, palpitations, back pain, rash, numbness, tingling, weakness. She does note that her legs are swollen but doesn't endorse any SOB herself.    In ED, bp 97/86  HR 74  RR 20 T 96.5F 100% on RA.  She was found to be hypokalemic 2.5 and was found to be in new onset atrial fibrillation and was started on heparin gtt. Lactate 3.8, trop 0.04, BNP 6349. PE study was not done due to an BENI.

## 2019-02-25 NOTE — ED PROVIDER NOTE - ATTENDING CONTRIBUTION TO CARE
I personally evaluated the patient. I reviewed the Resident’s or Physician Assistant’s note (as assigned above), and agree with the findings and plan except as documented in my note.  89 yo woman with SOB from SNF s/p recent ORIF of left hip;  Bilateral edema noted.  Patient with tachycardia and hypotension today at SNF;  New onset Afib noted.  Evaluated and patient with bilateral rhonchi.  Some lethargy.  Tachy with hypotension.  Moved up to critical care for continued evaluation and treatment.  At this time, patient full code but explained to the family the extent of illness and they are discussing advanced directive plan.  IV antibiotics ordered and bedside cardiac sono.  ? large PE vs. sepsis.

## 2019-02-25 NOTE — ED ADULT NURSE NOTE - OBJECTIVE STATEMENT
pt 89 y/o female c/o SOB , pt has recent h[facility] on left side [facility] . pt was found to have +2 pitting edema .

## 2019-02-26 NOTE — CONSULT NOTE ADULT - SUBJECTIVE AND OBJECTIVE BOX
NEPHROLOGY CONSULTATION NOTE    89 year old lady with a PMHx of HTN presenting to the ED with difficulty breathing. She was discharged to [facility] 1/2019 after a L hip fracture. As per the records from the nursing home she has become increasingly short of breath and her legs got more and more swollen.  Vitals at the nursing home showed that the patient was hypotensive in the 80s so they sent her to ED fo further evaluation. Denied HA, dizziness, cp, palpitations, back pain, rash, numbness, tingling, weakness. She does note that her legs are swollen but doesn't endorse any SOB herself.    In ED, bp 97/86  HR 74  RR 20 T 96.5F 100% on RA.  She was found to be hypokalemic 2.5 and was found to be in new onset atrial fibrillation and was started on heparin gtt. Lactate 3.8, trop 0.04, BNP 6349. PE study was not done due to an BENI.       PAST MEDICAL & SURGICAL HISTORY:  HTN (hypertension)  No significant past surgical history    Allergies:  No Known Allergies    Home Medications Reviewed    SOCIAL HISTORY:  Denies ETOH,Smoking,   FAMILY HISTORY:  No pertinent family history in first degree relatives        REVIEW OF SYSTEMS:  poor historian  All other review of systems is negative unless indicated above.    PHYSICAL EXAM:  NAD  awake and alert  frail  [place]  dry mm  b/l rhonchi  rrr  soft, nt  3+ edema    Hospital Medications:   MEDICATIONS  (STANDING):  ascorbic acid 500 milliGRAM(s) Oral daily  aspirin enteric coated 325 milliGRAM(s) Oral daily  calcium carbonate 1250 mG  + Vitamin D (OsCal 500 + D) 1 Tablet(s) Oral three times a day  cefepime   IVPB 1000 milliGRAM(s) IV Intermittent every 12 hours  chlorhexidine 4% Liquid 1 Application(s) Topical <User Schedule>  diltiazem    Tablet 30 milliGRAM(s) Oral every 8 hours  docusate sodium 100 milliGRAM(s) Oral three times a day  ferrous    sulfate 325 milliGRAM(s) Oral daily  folic acid 1 milliGRAM(s) Oral daily  furosemide   Injectable 40 milliGRAM(s) IV Push daily  heparin  Infusion. 800 Unit(s)/Hr (8 mL/Hr) IV Continuous <Continuous>  lactated ringers. 500 milliLiter(s) (500 mL/Hr) IV Continuous <Continuous>  latanoprost 0.005% Ophthalmic Solution 1 Drop(s) Both EYES at bedtime  multivitamin 1 Tablet(s) Oral daily        VITALS:  T(F): 97.3 (02-26-19 @ 15:52), Max: 98 (02-26-19 @ 05:06)  HR: 66 (02-26-19 @ 15:52)  BP: 110/67 (02-26-19 @ 15:52)  RR: 19 (02-26-19 @ 15:52)  SpO2: 99% (02-26-19 @ 15:52)  Wt(kg): --    02-25 @ 07:01  -  02-26 @ 07:00  --------------------------------------------------------  IN: 105 mL / OUT: 0 mL / NET: 105 mL    02-26 @ 07:01  -  02-26 @ 17:57  --------------------------------------------------------  IN: 45 mL / OUT: 0 mL / NET: 45 mL        Weight (kg): 54 (02-26 @ 12:17)    LABS:  02-26    138  |  96<L>  |  109<HH>  ----------------------------<  177<H>  3.2<L>   |  27  |  1.4    Ca    8.9      26 Feb 2019 09:26  Mg     1.8     02-26    TPro  7.2  /  Alb  2.3<L>  /  TBili  0.5  /  DBili      /  AST  100<H>  /  ALT  50<H>  /  AlkPhos  261<H>  02-25                          7.9    15.11 )-----------( 287      ( 26 Feb 2019 09:26 )             24.7       Urine Studies:        RADIOLOGY & ADDITIONAL STUDIES: NEPHROLOGY CONSULTATION NOTE    89 year old lady with a PMHx of HTN presenting to the ED with difficulty breathing. She was discharged to [facility] 1/2019 after a L hip fracture. As per the records from the nursing home she has become increasingly short of breath and her legs got more and more swollen.  Vitals at the nursing home showed that the patient was hypotensive in the 80s so they sent her to ED fo further evaluation. Denied HA, dizziness, cp, palpitations, back pain, rash, numbness, tingling, weakness. She does note that her legs are swollen but doesn't endorse any SOB herself.    In ED, bp 97/86  HR 74  RR 20 T 96.5F 100% on RA.  She was found to be hypokalemic 2.5 and was found to be in new onset atrial fibrillation and was started on heparin gtt. Lactate 3.8, trop 0.04, BNP 6349. PE study was not done due to an BENI.       PAST MEDICAL & SURGICAL HISTORY:  HTN (hypertension)  No significant past surgical history    Allergies:  No Known Allergies    Home Medications Reviewed    SOCIAL HISTORY:  Denies ETOH,Smoking,   FAMILY HISTORY:  No pertinent family history in first degree relatives        REVIEW OF SYSTEMS:  poor historian  All other review of systems is negative unless indicated above.    PHYSICAL EXAM:  NAD  awake and alert  frail  [place]  dry mm  b/l rhonchi  rrr  soft, nt  3+ edema    Hospital Medications:   MEDICATIONS  (STANDING):  ascorbic acid 500 milliGRAM(s) Oral daily  aspirin enteric coated 325 milliGRAM(s) Oral daily  calcium carbonate 1250 mG  + Vitamin D (OsCal 500 + D) 1 Tablet(s) Oral three times a day  cefepime   IVPB 1000 milliGRAM(s) IV Intermittent every 12 hours  chlorhexidine 4% Liquid 1 Application(s) Topical <User Schedule>  diltiazem    Tablet 30 milliGRAM(s) Oral every 8 hours  docusate sodium 100 milliGRAM(s) Oral three times a day  ferrous    sulfate 325 milliGRAM(s) Oral daily  folic acid 1 milliGRAM(s) Oral daily  furosemide   Injectable 40 milliGRAM(s) IV Push daily  heparin  Infusion. 800 Unit(s)/Hr (8 mL/Hr) IV Continuous <Continuous>  lactated ringers. 500 milliLiter(s) (500 mL/Hr) IV Continuous <Continuous>  latanoprost 0.005% Ophthalmic Solution 1 Drop(s) Both EYES at bedtime  multivitamin 1 Tablet(s) Oral daily        VITALS:  T(F): 97.3 (02-26-19 @ 15:52), Max: 98 (02-26-19 @ 05:06)  HR: 66 (02-26-19 @ 15:52)  BP: 110/67 (02-26-19 @ 15:52)  RR: 19 (02-26-19 @ 15:52)  SpO2: 99% (02-26-19 @ 15:52)  Wt(kg): --    02-25 @ 07:01  -  02-26 @ 07:00  --------------------------------------------------------  IN: 105 mL / OUT: 0 mL / NET: 105 mL    02-26 @ 07:01  -  02-26 @ 17:57  --------------------------------------------------------  IN: 45 mL / OUT: 0 mL / NET: 45 mL        Weight (kg): 54 (02-26 @ 12:17)    LABS:  02-26    138  |  96<L>  |  109<HH>  ----------------------------<  177<H>  3.2<L>   |  27  |  1.4    Ca    8.9      26 Feb 2019 09:26  Mg     1.8     02-26    TPro  7.2  /  Alb  2.3<L>  /  TBili  0.5  /  DBili      /  AST  100<H>  /  ALT  50<H>  /  AlkPhos  261<H>  02-25                          7.9    15.11 )-----------( 287      ( 26 Feb 2019 09:26 )             24.7       Urine Studies:        RADIOLOGY & ADDITIONAL STUDIES:    echo - ef 55% / mod mr/ mod as / mild lvh

## 2019-02-26 NOTE — PROGRESS NOTE ADULT - SUBJECTIVE AND OBJECTIVE BOX
STARLA SPEAR 89y Female  MRN#: [id number]   CODE STATUS: FULL      SUBJECTIVE  Patient is a 89y old Female who presents with a chief complaint of hypotension and SOB (26 Feb 2019 11:01)    Currently admitted to medicine with the primary diagnosis of Atrial fibrillation    Today is hospital day 1d, and this morning she is laying in bed comfortably and reports no overnight events.     OBJECTIVE  PAST MEDICAL & SURGICAL HISTORY  HTN (hypertension)  No significant past surgical history    ALLERGIES:  No Known Allergies    MEDICATIONS:  STANDING MEDICATIONS  ascorbic acid 500 milliGRAM(s) Oral daily  aspirin enteric coated 325 milliGRAM(s) Oral daily  calcium carbonate 1250 mG  + Vitamin D (OsCal 500 + D) 1 Tablet(s) Oral three times a day  cefepime   IVPB 1000 milliGRAM(s) IV Intermittent every 12 hours  chlorhexidine 4% Liquid 1 Application(s) Topical <User Schedule>  diltiazem    Tablet 30 milliGRAM(s) Oral every 8 hours  docusate sodium 100 milliGRAM(s) Oral three times a day  ferrous    sulfate 325 milliGRAM(s) Oral daily  folic acid 1 milliGRAM(s) Oral daily  furosemide   Injectable 40 milliGRAM(s) IV Push daily  heparin  Infusion 900 Unit(s)/Hr (9 mL/Hr) IV Continuous <Continuous>  lactated ringers. 500 milliLiter(s) (500 mL/Hr) IV Continuous <Continuous>  latanoprost 0.005% Ophthalmic Solution 1 Drop(s) Both EYES at bedtime  multivitamin 1 Tablet(s) Oral daily    PRN MEDICATIONS  senna 2 Tablet(s) Oral at bedtime PRN      VITAL SIGNS: Last 24 Hours  T(C): 36.7 (26 Feb 2019 07:20), Max: 36.7 (26 Feb 2019 05:06)  T(F): 98 (26 Feb 2019 07:20), Max: 98 (26 Feb 2019 05:06)  HR: 115 (26 Feb 2019 07:20) (83 - 130)  BP: 133/58 (26 Feb 2019 07:20) (97/51 - 133/58)  BP(mean): --  RR: 19 (26 Feb 2019 07:20) (18 - 19)  SpO2: 100% (26 Feb 2019 07:20) (96% - 100%)    LABS:                        7.9    15.11 )-----------( 287      ( 26 Feb 2019 09:26 )             24.7     02-26    138  |  96<L>  |  109<HH>  ----------------------------<  177<H>  3.2<L>   |  27  |  1.4    Ca    8.9      26 Feb 2019 09:26  Mg     1.8     02-26    TPro  7.2  /  Alb  2.3<L>  /  TBili  0.5  /  DBili  x   /  AST  100<H>  /  ALT  50<H>  /  AlkPhos  261<H>  02-25    PTT - ( 26 Feb 2019 09:26 )  PTT:>200.0 sec    RADIOLOGY:    Creatine Kinase, Serum: 76 U/L (02-26-19 @ 01:12)  Creatine Kinase, Serum: 75 U/L (02-26-19 @ 01:12)  Troponin T, Serum: 0.03 ng/mL <HH> (02-26-19 @ 01:12)  Troponin T, Serum: 0.04 ng/mL <HH> (02-25-19 @ 12:26)  Lactate, Blood: 3.8 mmol/L <H> (02-25-19 @ 12:26)      CARDIAC MARKERS ( 26 Feb 2019 01:12 )  x     / 0.03 ng/mL / 76 U/L / x     / 2.8 ng/mL  CARDIAC MARKERS ( 25 Feb 2019 12:26 )  x     / 0.04 ng/mL / x     / x     / x            PHYSICAL EXAM:    GENERAL: NAD, well-developed,   HEENT:  Atraumatic, Normocephalic. EOMI, PERRLA, conjunctiva and sclera clear, No JVD  PULMONARY: Clear to auscultation bilaterally; No wheeze  CARDIOVASCULAR: Atrial Fibrillation   GASTROINTESTINAL: Soft, Nontender, Nondistended; Bowel sounds present  MUSCULOSKELETAL:  2+ Peripheral Pulses, No clubbing, cyanosis, or edema  NEUROLOGY: non-focal      ADMISSION SUMMARY  HPI:  89 year old lady with a PMHx of HTN presenting to the ED with difficulty breathing. She was discharged to [facility] 1/2019 after a L hip fracture. As per the records from the nursing home she has become increasingly short of breath and her legs got more and more swollen.  Vitals at the nursing home showed that the patient was hypotensive in the 80s so they sent her to ED fo further evaluation. Denied HA, dizziness, cp, palpitations, back pain, rash, numbness, tingling, weakness. She does note that her legs are swollen but doesn't endorse any SOB herself.    In ED, bp 97/86  HR 74  RR 20 T 96.5F 100% on RA.  She was found to be hypokalemic 2.5 and was found to be in new onset atrial fibrillation and was started on heparin gtt. Lactate 3.8, trop 0.04, BNP 6349. PE study was not done due to an BENI. (25 Feb 2019 16:32)

## 2019-02-26 NOTE — CONSULT NOTE ADULT - ASSESSMENT
BENI   - significant prerenal azotemia with elevated bun/cr ratio   - likely due to hypotension, chf, infection, anemia.  Also r/o UGIB   - cr improving since admission  hypotension on admission now improved  fluid overload  HFpEF / mod mr and mod as with 55% EF on 1/19 echo  hypokalemia  anemia   -brown stool in ED  new afib  leukocytosis  recent left hip fx, s/p orif complicated by post-op BENI    plan:    change to lasix 20mg ivp q12h (hold for sbp < 90).  Will try to keep mild negative balance as bp and renal function allows, though will be difficult to monitor urine ouput without wayne  check renal / bladder sonogram  check UA, FeNa%, Uprot/cr and UC&S - may need to straight cath to obtain urine  norvasc changed to po cardizem for rate control  monitor h/h, check ferritin, check stool for occult blood  consider PRBC transfusion if h/h worsens, especially with heparin on board  replete K+  no iv dye, no RAAS-Inh, no nsaids  will follow

## 2019-02-26 NOTE — PROGRESS NOTE ADULT - ASSESSMENT
89 year old lady with a PMHx of HTN presenting to the ED with difficulty breathing, found to be in new onset Afib in ED    1) New Onset Atrial Fibrillation   -CHADSVASC 4  -c/w heparin for now. Can ultimately switch to NoAC upon discharge  -f/u PTT  -currently rate controlled  -consider cardiology consult   Follow with Cardiology   Consider switching to eliquis     2) Lower Extremity edema   -BNP 6349  -trop 0.04. f/u repeat and trend  -TTE(1/2019): EF 60%, moderate AS  -repeat TTE    3) BENI on CKD  -likely prerenal due to hypotension  -baseline Cr 1.0 1/2019  -current Cr 1.6  -.  -monitor BMP  -f/u urine lytes    4) Hypokalemia  -K 3.2  Repleted will follow with BMP at 4pm.     5) Folic Acid Deficiency   Continue with Folic acid.     Dispo  -from [facility]  -ambulates independently with cane/walker  -anticipated discharge when medically stable    DVT ppx   OOBTC  DASH/TLC   FULL CODE

## 2019-02-26 NOTE — CONSULT NOTE ADULT - SUBJECTIVE AND OBJECTIVE BOX
STARLA SPEAR  89y, Female  Allergy: No Known Allergies      HPI:  89 year old lady with a PMHx of HTN presenting to the ED with difficulty breathing. She was discharged to [facility] 1/2019 after a L hip fracture. As per the records from the nursing home she has become increasingly short of breath and her legs got more and more swollen.  Vitals at the nursing home showed that the patient was hypotensive in the 80s so they sent her to ED fo further evaluation. Denied HA, dizziness, cp, palpitations, back pain, rash, numbness, tingling, weakness. She does note that her legs are swollen but doesn't endorse any SOB herself.    In ED, bp 97/86  HR 74  RR 20 T 96.5F 100% on RA.  She was found to be hypokalemic 2.5 and was found to be in new onset atrial fibrillation and was started on heparin gtt. Lactate 3.8, trop 0.04, BNP 6349. PE study was not done due to an BENI. (25 Feb 2019 16:32)    FAMILY HISTORY:  No pertinent family history in first degree relatives    PAST MEDICAL & SURGICAL HISTORY:  HTN (hypertension)  No significant past surgical history        VITALS:  T(F): 98, Max: 98 (02-26-19 @ 05:06)  HR: 115  BP: 133/58  RR: 19Vital Signs Last 24 Hrs  T(C): 36.7 (26 Feb 2019 07:20), Max: 36.7 (26 Feb 2019 05:06)  T(F): 98 (26 Feb 2019 07:20), Max: 98 (26 Feb 2019 05:06)  HR: 115 (26 Feb 2019 07:20) (83 - 130)  BP: 133/58 (26 Feb 2019 07:20) (97/51 - 133/58)  BP(mean): --  RR: 19 (26 Feb 2019 07:20) (18 - 19)  SpO2: 100% (26 Feb 2019 07:20) (96% - 100%)    TESTS & MEASUREMENTS:                        7.9    15.11 )-----------( 287      ( 26 Feb 2019 09:26 )             24.7     02-26    134<L>  |  92<L>  |  105<HH>  ----------------------------<  169<H>  3.5   |  28  |  1.5    Ca    8.4<L>      26 Feb 2019 01:12  Mg     1.8     02-26    TPro  7.2  /  Alb  2.3<L>  /  TBili  0.5  /  DBili  x   /  AST  100<H>  /  ALT  50<H>  /  AlkPhos  261<H>  02-25    LIVER FUNCTIONS - ( 25 Feb 2019 12:26 )  Alb: 2.3 g/dL / Pro: 7.2 g/dL / ALK PHOS: 261 U/L / ALT: 50 U/L / AST: 100 U/L / GGT: x                   RADIOLOGY & ADDITIONAL TESTS:    ANTIBIOTICS:  cefepime   IVPB 1000 milliGRAM(s) IV Intermittent every 12 hours

## 2019-02-26 NOTE — CONSULT NOTE ADULT - ASSESSMENT
89 year old lady with a PMHx of HTN presenting to the ED with difficulty breathing. She was discharged to [facility] 1/2019 after a L hip fracture. As per the records from the nursing home she has become increasingly short of breath and her legs got more and more swollen.  Vitals at the nursing home showed that the patient was hypotensive in the 80s so they sent her to ED fo further evaluation. Denied HA, dizziness, cp, palpitations, back pain, rash, numbness, tingling, weakness. She does note that her legs are swollen but doesn't endorse any SOB herself.    In ED, bp 97/86  HR 74  RR 20 T 96.5F 100% on RA.  She was found to be hypokalemic 2.5 and was found to be in new onset atrial fibrillation and was started on heparin gtt. Lactate 3.8, trop 0.04, BNP 6349.  PE study with low probability of PE    IMPRESSION:  SIRS with lactate acidemia  No obvious focus of infection  UA pending but clinically no pyelonephritis  No PNA  Clinically no intraabdominal focus. Doubt mesenteric ischemia    RECOMMENDATIONS  UA, UCx  BCx  Hold ABx for now

## 2019-02-27 NOTE — CONSULT NOTE ADULT - SUBJECTIVE AND OBJECTIVE BOX
Patient is a 89y old  Female who presents with a chief complaint of hypotension and SOB (26 Feb 2019 17:56)      HPI: Afib, tachycardia, severe anemia, unknown etiology, possibility of related to liver massess and possible malignancy?    89 year old lady with a PMHx of HTN presenting to the ED with difficulty breathing. She was discharged to [facility] 1/2019 after a L hip fracture. As per the records from the nursing home she has become increasingly short of breath and her legs got more and more swollen.  Vitals at the nursing home showed that the patient was hypotensive in the 80s so they sent her to ED fo further evaluation. Denied HA, dizziness, cp, palpitations, back pain, rash, numbness, tingling, weakness. She does note that her legs are swollen but doesn't endorse any SOB herself.    In ED, bp 97/86  HR 74  RR 20 T 96.5F 100% on RA.  She was found to be hypokalemic 2.5 and was found to be in new onset atrial fibrillation and was started on heparin gtt. Lactate 3.8, trop 0.04, BNP 6349. PE study was not done due to an BENI. (25 Feb 2019 16:32)      PAST MEDICAL & SURGICAL HISTORY:  HTN (hypertension)  No significant past surgical history      PREVIOUS DIAGNOSTIC TESTING:      ECHO  FINDINGS: < from: Transthoracic Echocardiogram (01.22.19 @ 10:02) >   1. Normal global left ventricular systolic function.   2. LV Ejection Fraction by Cho's Method with a biplane EF of 60 %.   3. Mildly increased LV wall thickness.   4. There is mild concentric left ventricular hypertrophy.   5. The mean global longitudinal strain by speckle tracking is -18.4%   which iw normal.   6. Moderate mitral valve regurgitation.   7. Severe mitral annular calcification.   8. Mild tricuspid regurgitation.   9. Mild aortic regurgitation.  10. Peak transaortic gradient is 43.6 mmHg, mean transaortic gradient   equals 27.6 mmHg, the calculated aortic valve area equals 0.67 cm² by the   continuity equation consistent with moderate aortic stenosis.    < end of copied text >      MEDICATIONS  (STANDING):  ascorbic acid 500 milliGRAM(s) Oral daily  aspirin enteric coated 325 milliGRAM(s) Oral daily  calcium carbonate 1250 mG  + Vitamin D (OsCal 500 + D) 1 Tablet(s) Oral three times a day  cefepime   IVPB 1000 milliGRAM(s) IV Intermittent every 12 hours  chlorhexidine 4% Liquid 1 Application(s) Topical <User Schedule>  diltiazem    Tablet 30 milliGRAM(s) Oral every 8 hours  docusate sodium 100 milliGRAM(s) Oral three times a day  ferrous    sulfate 325 milliGRAM(s) Oral daily  folic acid 1 milliGRAM(s) Oral daily  furosemide   Injectable 20 milliGRAM(s) IV Push two times a day  latanoprost 0.005% Ophthalmic Solution 1 Drop(s) Both EYES at bedtime  metoprolol tartrate 25 milliGRAM(s) Oral every 8 hours  metoprolol tartrate Injectable 2.5 milliGRAM(s) IV Push once  multivitamin 1 Tablet(s) Oral daily    MEDICATIONS  (PRN):  senna 2 Tablet(s) Oral at bedtime PRN Constipation      FAMILY HISTORY:  No pertinent family history in first degree relatives      SOCIAL HISTORY:  CIGARETTES: no  ALCOHOL: no  DRUGS: no                      REVIEW OF SYSTEMS:  CONSTITUTIONAL: No distress, Looks stable  not able to answer any question, 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 the lips, but looks comfortable          Vital Signs Last 24 Hrs  T(C): 36.1 (27 Feb 2019 08:00), Max: 36.3 (26 Feb 2019 15:52)  T(F): 97 (27 Feb 2019 08:00), Max: 97.4 (27 Feb 2019 04:30)  HR: 93 (27 Feb 2019 08:00) (66 - 129)  BP: 104/54 (27 Feb 2019 08:00) (80/50 - 110/67)  BP(mean): --  RR: 17 (27 Feb 2019 08:00) (17 - 19)  SpO2: 99% (26 Feb 2019 15:52) (99% - 99%)                      PHYSICAL EXAM:  GENERAL: No distress, slender petite female  HEAD:  Atraumatic, Normocephalic  NECK: Supple, No JVD, No Bruit   NERVOUS SYSTEM:  Alert, Awake,   CHEST/LUNG: Normal air entry to lung base bilaterally; No wheeze, crackle, rales, rhonchi  HEART: S1, not audible A2, P2, No S3, No gallop, 2/6 systolic aortic murmur  ABDOMEN: Soft, Non tender, Non distended; Bowel sounds present  EXTREMITIES:  2+ Peripheral Pulses, No clubbing, mild 1+edema  SKIN: No rashes or lesions    TELEMETRY: afib, rate 110-120    ECG: < from: 12 Lead ECG (02.26.19 @ 01:51) >  Atrial fibrillation with rapid ventricular response  ST & T wave abnormality, consider lateral ischemia  Abnormal ECG    < end of copied text >      I&O's Detail    26 Feb 2019 07:01  -  27 Feb 2019 07:00  --------------------------------------------------------  IN:    heparin  Infusion.: 45 mL  Total IN: 45 mL    OUT:  Total OUT: 0 mL    Total NET: 45 mL          LABS:                        6.4    11.02 )-----------( 197      ( 27 Feb 2019 05:47 )             19.7     02-26    138  |  96<L>  |  109<HH>  ----------------------------<  177<H>  3.2<L>   |  27  |  1.4    Ca    9.4      27 Feb 2019 05:47  Mg     1.8     02-26    TPro  x   /  Alb  x   /  TBili  0.6  /  DBili  x   /  AST  x   /  ALT  x   /  AlkPhos  x   02-27    CARDIAC MARKERS ( 26 Feb 2019 01:12 )  x     / 0.03 ng/mL / 76 U/L / x     / 2.8 ng/mL  CARDIAC MARKERS ( 25 Feb 2019 12:26 )  x     / 0.04 ng/mL / x     / x     / x          PTT - ( 27 Feb 2019 05:47 )  PTT:80.0 sec    I&O's Summary    26 Feb 2019 07:01  -  27 Feb 2019 07:00  --------------------------------------------------------  IN: 45 mL / OUT: 0 mL / NET: 45 mL        RADIOLOGY & ADDITIONAL STUDIES: < from: Xray Chest 1 View- PORTABLE-Urgent (02.25.19 @ 14:47) >    No consolidation effusion or pneumothorax.    < end of copied text >

## 2019-02-27 NOTE — PROGRESS NOTE ADULT - SUBJECTIVE AND OBJECTIVE BOX
NEPHROLOGY FOLLOW UP NOTE    pt seen and examined  less swollen  tolerating some po with assistance  no new complaints, though poor historian      PAST MEDICAL & SURGICAL HISTORY:  HTN (hypertension)  No significant past surgical history    Allergies:  No Known Allergies    Home Medications Reviewed    SOCIAL HISTORY:  Denies ETOH,Smoking,   FAMILY HISTORY:  No pertinent family history in first degree relatives        REVIEW OF SYSTEMS:  poor historian  All other review of systems is negative unless indicated above.    PHYSICAL EXAM:  NAD  awake and alert  frail  [place]  dry mm  b/l rhonchi  rrr  soft, nt  2+ edema    Hospital Medications:   MEDICATIONS  (STANDING):  ascorbic acid 500 milliGRAM(s) Oral daily  aspirin enteric coated 325 milliGRAM(s) Oral daily  calcium carbonate 1250 mG  + Vitamin D (OsCal 500 + D) 1 Tablet(s) Oral three times a day  cefepime   IVPB 1000 milliGRAM(s) IV Intermittent every 12 hours  chlorhexidine 4% Liquid 1 Application(s) Topical <User Schedule>  diltiazem    Tablet 30 milliGRAM(s) Oral every 8 hours  docusate sodium 100 milliGRAM(s) Oral three times a day  ferrous    sulfate 325 milliGRAM(s) Oral daily  folic acid 1 milliGRAM(s) Oral daily  furosemide   Injectable 20 milliGRAM(s) IV Push two times a day  latanoprost 0.005% Ophthalmic Solution 1 Drop(s) Both EYES at bedtime  metoprolol tartrate 25 milliGRAM(s) Oral every 8 hours  multivitamin 1 Tablet(s) Oral daily        VITALS:  T(F): 96.8 (02-27-19 @ 13:29), Max: 97.4 (02-27-19 @ 04:30)  HR: 99 (02-27-19 @ 13:29)  BP: 88/51 (02-27-19 @ 13:29)  RR: 18 (02-27-19 @ 13:29)  SpO2: 99% (02-26-19 @ 15:52)  Wt(kg): --    02-25 @ 07:01  -  02-26 @ 07:00  --------------------------------------------------------  IN: 105 mL / OUT: 0 mL / NET: 105 mL    02-26 @ 07:01  -  02-27 @ 07:00  --------------------------------------------------------  IN: 45 mL / OUT: 0 mL / NET: 45 mL    02-27 @ 07:01  -  02-27 @ 14:53  --------------------------------------------------------  IN: 150 mL / OUT: 0 mL / NET: 150 mL      Height (cm): 157.48 (02-26 @ 22:08)  Weight (kg): 57.1 (02-27 @ 10:59)  BMI (kg/m2): 23 (02-27 @ 10:59)  BSA (m2): 1.57 (02-27 @ 10:59)    LABS:  02-27    140  |  95<L>  |  115<HH>  ----------------------------<  147<H>  3.1<L>   |  29  |  1.5    Ca    9.4      27 Feb 2019 05:47  Phos  3.0     02-27  Mg     1.8     02-26    TPro  5.9<L>  /  Alb  1.8<L>  /  TBili  0.6  /  DBili      /  AST  79<H>  /  ALT  39  /  AlkPhos  167<H>  02-27                          6.4    11.02 )-----------( 197      ( 27 Feb 2019 05:47 )             19.7       Urine Studies:        RADIOLOGY & ADDITIONAL STUDIES:      echo - ef 55% / mod mr/ mod as / mild lvh

## 2019-02-27 NOTE — PROGRESS NOTE ADULT - ASSESSMENT
BENI   - prerenal azotemia  hypotension   - better  peripheral edema   - better, but bp's low  severe hypoalbuminemia  cirrhosis  liver masses on sonogram  HFpEF / mod mr and mod as with 55% EF on 1/19 echo  hypokalemia  anemia   -brown stool in ED  new afib  leukocytosis   - better  recent left hip fx, s/p orif complicated by post-op BENI    plan:    dc lasix  prbc transfusion  KCl repletion  would d/w family re further w/u of liver masses and goals of care, as not pursuing lesions may be best approach due to age, poor functional status and comorbidities  iv lopressor per cardio  iv dye and mri with marcel both contraindicated due to renal dysfunction

## 2019-02-27 NOTE — PROGRESS NOTE ADULT - SUBJECTIVE AND OBJECTIVE BOX
RAINER, STARLA  89y, Female      OVERNIGHT EVENTS:  none      VITALS:  T(F): 97, Max: 97.4 (02-27-19 @ 04:30)  HR: 93  BP: 104/54  RR: 17Vital Signs Last 24 Hrs  T(C): 36.1 (27 Feb 2019 08:00), Max: 36.3 (26 Feb 2019 15:52)  T(F): 97 (27 Feb 2019 08:00), Max: 97.4 (27 Feb 2019 04:30)  HR: 93 (27 Feb 2019 08:00) (66 - 129)  BP: 104/54 (27 Feb 2019 08:00) (80/50 - 110/67)  BP(mean): --  RR: 17 (27 Feb 2019 08:00) (17 - 19)  SpO2: 99% (26 Feb 2019 15:52) (99% - 99%)    TESTS & MEASUREMENTS:                        6.4    11.02 )-----------( 197      ( 27 Feb 2019 05:47 )             19.7     02-27    140  |  95<L>  |  115<HH>  ----------------------------<  147<H>  3.1<L>   |  29  |  1.5    Ca    9.4      27 Feb 2019 05:47  Phos  3.0     02-27  Mg     1.8     02-26    TPro  5.9<L>  /  Alb  1.8<L>  /  TBili  0.6  /  DBili  x   /  AST  79<H>  /  ALT  39  /  AlkPhos  167<H>  02-27    LIVER FUNCTIONS - ( 27 Feb 2019 05:47 )  Alb: 1.8 g/dL / Pro: 5.9 g/dL / ALK PHOS: 167 U/L / ALT: 39 U/L / AST: 79 U/L / GGT: x             Culture - Blood (collected 02-25-19 @ 12:54)  Source: .Blood Blood  Preliminary Report (02-26-19 @ 23:01):    No growth to date.            RADIOLOGY & ADDITIONAL TESTS:    ANTIBIOTICS:  cefepime   IVPB 1000 milliGRAM(s) IV Intermittent every 12 hours

## 2019-02-27 NOTE — CONSULT NOTE ADULT - ASSESSMENT
stage 2 pressure sore left hip--> rec allevyne pad    stage 4 pressure sacrum--> rec soap and water qd, santyl oinmtment an ddakins wet to dry dressing change bid    no further surgery needed at this time

## 2019-02-27 NOTE — BRIEF OPERATIVE NOTE - PROCEDURE
Debridement  02/27/2019  bedside sharp excisional debridement [id number]  Active  [id number] <<-----Click on this checkbox to enter Procedure

## 2019-02-27 NOTE — PROGRESS NOTE ADULT - ASSESSMENT
STARLA SPEAR   89y   Female    MRN#: [id number]         SUBJECTIVE  Patient is a 89y old Female who presents with a chief complaint of hypotension and SOB (27 Feb 2019 18:37)  Currently admitted to medicine with the primary diagnosis of Atrial fibrillation  Hospital course : Patient was admitted for hypotension - A VQ scan was performed and showed low probability for PE. A venous duplex of the lower extremities showed no evidence of DVT - Patient found to be in Afib not controlled on Cardizem - Transferred to Tele for monitoring   Today is hospital day 2d, and this morning she is not in distress, active complaints cannot be assessed as patient is deaf - Overnight patient was in Afib and hypotensive - tachycardic despite PO Cardizem and transferred to Tele this morning     Present Today:           Short Catheter X          Central Line X          IV Fluids X          Drains X      OBJECTIVE  ------------------------------------    PAST MEDICAL & SURGICAL HISTORY  HTN (hypertension)  No significant past surgical history      ALLERGIES:  No Known Allergies      MEDICATIONS:  STANDING MEDICATIONS  ascorbic acid 500 milliGRAM(s) Oral daily  aspirin enteric coated 325 milliGRAM(s) Oral daily  calcium carbonate 1250 mG  + Vitamin D (OsCal 500 + D) 1 Tablet(s) Oral three times a day  cefepime   IVPB 1000 milliGRAM(s) IV Intermittent every 12 hours  chlorhexidine 4% Liquid 1 Application(s) Topical <User Schedule>  diltiazem    Tablet 30 milliGRAM(s) Oral every 8 hours  docusate sodium 100 milliGRAM(s) Oral three times a day  ferrous    sulfate 325 milliGRAM(s) Oral daily  folic acid 1 milliGRAM(s) Oral daily  latanoprost 0.005% Ophthalmic Solution 1 Drop(s) Both EYES at bedtime  metoprolol tartrate 25 milliGRAM(s) Oral every 8 hours  multivitamin 1 Tablet(s) Oral daily    PRN MEDICATIONS  oxyCODONE    IR 5 milliGRAM(s) Oral every 8 hours PRN  senna 2 Tablet(s) Oral at bedtime PRN        LABS:                        6.4    11.02 )-----------( 197      ( 27 Feb 2019 05:47 )             19.7     02-27    140  |  95<L>  |  115<HH>  ----------------------------<  147<H>  3.1<L>   |  29  |  1.5    Ca    9.4      27 Feb 2019 05:47  Phos  3.0     02-27  Mg     1.8     02-26    TPro  5.9<L>  /  Alb  1.8<L>  /  TBili  0.6  /  DBili  x   /  AST  79<H>  /  ALT  39  /  AlkPhos  167<H>  02-27    PT/INR - ( 27 Feb 2019 09:40 )   PT: 15.30 sec;   INR: 1.33 ratio         PTT - ( 27 Feb 2019 05:47 )  PTT:80.0 sec      Troponin T, Serum: 0.03 ng/mL <HH> (02-27-19 @ 11:27)  Sedimentation Rate, Erythrocyte: 109 mm/hr <H> (02-27-19 @ 05:47)      Culture - Blood (collected 25 Feb 2019 12:54)  Source: .Blood Blood  Preliminary Report (26 Feb 2019 23:01):    No growth to date.      CARDIAC MARKERS ( 27 Feb 2019 11:27 )  x     / 0.03 ng/mL / x     / x     / 1.9 ng/mL  CARDIAC MARKERS ( 26 Feb 2019 01:12 )  x     / 0.03 ng/mL / 76 U/L / x     / 2.8 ng/mL        RADIOLOGY:    < from: [facility] Duplex Lower Ext Vein Scan, Bilat (02.26.19 @ 15:13) >  No evidence of deep venous thrombosis or superficial thrombophlebitis in   the bilateral lower extremities.    < from: [facility] Pulmonary Ventilation/Perfusion Scan (02.25.19 @ 22:42) >  LOW PROBABILITY FOR PULMONARY EMBOLISM.  No evidence of unmatched perfusion defect. Nonsegmental on right lower   lobe superior and medial segment defects; and segmental right lower lobe   anterior, lateral and posterior basal defects, completely matched with   ventilatory defects.      < from: US Retroperitoneal Complete (02.26.19 @ 21:00) >  1 . Incidental cirrhotic appearing liver with multiple indeterminate   masses. Findings suspicious for liver cirrhosis with primary or   metastatic tumors and further evaluation recommended with MRI abdomen   with and without IV contrast.  2. Atrophic kidneys with increased parenchymal echogenicity, suggestive   of medical renal disease.  3. No hydronephrosis.  4. Urinary bladder volume 311 cc. Patient did not void for examination.  5. Mild perihepatic ascites.      < from: CT Abdomen and Pelvis No Cont (02.27.19 @ 17:38) >  1.Status post left gamma nail placement for intertrochanteric fracture   with adjacent 5.8 x 2.8 x 4.4 cm subcutaneous collection consistent with   hematoma/seroma   2. Small-volume abdominal pelvic ascites.          PHYSICAL EXAM:  VITAL SIGNS: Last 24 Hours  T(C): 36 (27 Feb 2019 13:29), Max: 36.3 (27 Feb 2019 04:30)  T(F): 96.8 (27 Feb 2019 13:29), Max: 97.4 (27 Feb 2019 04:30)  HR: 99 (27 Feb 2019 13:29) (93 - 129)  BP: 88/51 (27 Feb 2019 13:29) (77/40 - 104/54)  BP(mean): --  RR: 18 (27 Feb 2019 13:29) (17 - 18)  SpO2: --    GENERAL: No acute distress - Alert   PULMONARY: decreased breath sounds at the bases - no respiratory distress   CARDIOVASCULAR: Irregular S1S2 - Systolic ejection murmur   GASTROINTESTINAL: Soft, Nontender, Nondistended; Bowel sounds present  MUSCULOSKELETAL:  + Peripheral Pulses, Lower extremity edema       ASSESSMENT & PLAN    89 year old lady with a PMHx of HTN presenting to the ED with difficulty breathing, found to be in new onset Afib in ED    #) New Onset Atrial Fibrillation   -CHADSVASC 4  -Heparin drip stopped as Drop in Hb to 6.4 this morning   -S/P Metoprolol 2.5 IV STAT followed by 25 q8 - rate fluctuating between 100 and 120  -cardio : Metoprolol as above - Heparin 5000 BID - No long term anticoagulation until liver pathology worked up     #) Hypotension, Anemia Hb 6.4 : Active bleeding ?  - Bowel movements normal - no signs of active bleed  - Transfused 2 units so far - 3rd one will be given - Repeat CBC at 11.30 PM post 3rd unit   - CT scan abdomen and pelvis no contrast showed collection next to left gamma nail hematoma vs seroma -> spoke with orthopedics regarding results - per ortho normal finding post-op / chronic / not concerning / not exacerbated by heparin and wouldn't explain drop in Hb       #) Lower Extremity edema   -BNP 6349  -trop 0.04 -> 0.03 -> 0.03  -TTE (1/2019): EF 60%, moderate AS    #) BENI on CKD  -likely prerenal due to hypotension  -baseline Cr 1.0 1/2019  -current Cr 1.5 -   -Hypokalemia 3.1 noted -> KCL 40 mEq oral x 2 + repeat BMP at 11.30 PM     #) Findings consistent with liver cirrhosis and liver masses on US and CT scan - discussed results with son Inocencio, per son patient has no history of chronic liver disease and malignancy - at this time doesn't want workup prefers to wait until cardiac issue resolved before making final decision - wants palliative on board   - Elevated ALP and AST - INR 1.33 - Bilirubin normal - Albumin low 1.8   - Hepatitis panel sent     #) Folic Acid Deficiency   Continue with Folic acid.     # DVT prophylaxis : No AC for now   # GI prophylaxis : X  # Activity : Increase as tolerated   # Diet : Nectar thick   # Code : FULL for now - palliative consult placed   # Disposition : Acute

## 2019-02-27 NOTE — CONSULT NOTE ADULT - SUBJECTIVE AND OBJECTIVE BOX
bedridden women with sacral and lef hip pressure sores    PE: sacrum--> [id number] stage 4 pressure sore with necrotic tissue-> culture sent  left hip/ trochanter--> 1x1 cm stage 2 pressure sore--> no infection    PROC: sharp excisional debridement to and including fascia sacrum

## 2019-02-27 NOTE — CONSULT NOTE ADULT - ASSESSMENT
Afib first time detected  severe anemia  liver mass, possible cirrhosis  slender, frail female with left forearm skin laceration and ecchymosis    metoprolol 2.5 mg iv an 25 mg po q 8 h  heparin sq 5000 u bid  2 units of PRBC, 20 mg iv Lasix after the second unit, unless gets SOB then give 20 mg iv Lasix after each unit  no long term anti coagulation until malignancy cirrhosis liver mass is assessed properly

## 2019-02-27 NOTE — PROGRESS NOTE ADULT - ASSESSMENT
IMPRESSION:  SIRS with lactate acidemia  No obvious focus of infection  Clinically no pyelonephritis  No PNA  Clinically no intraabdominal focus. Doubt mesenteric ischemia  BCx NTD  WBC 11    RECOMMENDATIONS  No ABx for now

## 2019-02-28 NOTE — CONSULT NOTE ADULT - CONSULT REASON
PNA
afib, tachycardia
for bright red bleeding per rectum
reji
goals of care and symptom management
sacral pressure sore

## 2019-02-28 NOTE — DIETITIAN INITIAL EVALUATION ADULT. - DIET TYPE
~50-75% PO at meals per staff, observed pt. during breakfast <50% consumed/1500ml/DASH/TLC (sodium and cholesterol restricted diet)

## 2019-02-28 NOTE — DIETITIAN INITIAL EVALUATION ADULT. - SOURCE
staff/other (specify) staff, pt. is [place], but able to provide minimum assessment information. Pt. had previous visit earlier this year- some info obtained from previous assessment/other (specify)

## 2019-02-28 NOTE — DIETITIAN INITIAL EVALUATION ADULT. - NS FNS WEIGHT CHANGE REASON
Pt. appears to have gained weight comparing to previous admit wt, will continue to monitor wt trends

## 2019-02-28 NOTE — PROGRESS NOTE ADULT - ASSESSMENT
STARLA SPEAR   89y   Female    MRN#: [id number]         SUBJECTIVE  Patient is a 89y old Female who presents with a chief complaint of hypotension and SOB (28 Feb 2019 12:46)  Currently admitted to medicine with the primary diagnosis of Atrial fibrillation  Hospital course : Patient was admitted for hypotension - A VQ scan was performed and showed low probability for PE. A venous duplex of the lower extremities showed no evidence of DVT - Patient found to be in Afib not controlled on Cardizem - Transferred to [facility] for monitoring, Metoprolol added - 02/27 drop in Hb to 6.4, 3 units RBC given with improvement of Hb, CT scan non contrast performed and showed a collection (hematoma vs seroma) next to the left gamma nail -> ortho normal post-op finding not acute bleeding 2/2 heparin -Palliative care discussion between palliative car team and family on 02/28   Today is hospital day 3d, and this morning she is stable appears uncomfortable in bed as she is constantly leaning to her right side. Unable to assess active complaints. Yesterday evening bedside debridement performed by Dr. Meléndez    Present Today:           Short Catheter X          Central Line X          IV Fluids X          Drains X      OBJECTIVE  ------------------------------------    PAST MEDICAL & SURGICAL HISTORY  HTN (hypertension)  No significant past surgical history      ALLERGIES:  No Known Allergies      MEDICATIONS:  STANDING MEDICATIONS  ascorbic acid 500 milliGRAM(s) Oral daily  aspirin enteric coated 325 milliGRAM(s) Oral daily  calcium carbonate 1250 mG  + Vitamin D (OsCal 500 + D) 1 Tablet(s) Oral three times a day  cefepime   IVPB 1000 milliGRAM(s) IV Intermittent every 12 hours  chlorhexidine 4% Liquid 1 Application(s) Topical <User Schedule>  diltiazem    Tablet 30 milliGRAM(s) Oral every 8 hours  docusate sodium 100 milliGRAM(s) Oral three times a day  ferrous    sulfate 325 milliGRAM(s) Oral daily  folic acid 1 milliGRAM(s) Oral daily  latanoprost 0.005% Ophthalmic Solution 1 Drop(s) Both EYES at bedtime  metoprolol tartrate 25 milliGRAM(s) Oral every 8 hours  multivitamin 1 Tablet(s) Oral daily    PRN MEDICATIONS  morphine Concentrate 5 milliGRAM(s) Oral every 4 hours PRN  senna 2 Tablet(s) Oral at bedtime PRN        LABS:                        11.7   13.81 )-----------( 161      ( 28 Feb 2019 06:07 )             34.4     02-28    136  |  96<L>  |  111<HH>  ----------------------------<  142<H>  4.6   |  28  |  1.6<H>    Ca    9.2      28 Feb 2019 06:07  Phos  3.0     02-27  Mg     1.9     02-28    TPro  6.3  /  Alb  1.9<L>  /  TBili  0.8  /  DBili  x   /  AST  95<H>  /  ALT  45<H>  /  AlkPhos  170<H>  02-28    PT/INR - ( 28 Feb 2019 06:07 )   PT: 14.30 sec;   INR: 1.25 ratio         PTT - ( 28 Feb 2019 06:07 )  PTT:31.6 sec          CARDIAC MARKERS ( 27 Feb 2019 11:27 )  x     / 0.03 ng/mL / x     / x     / 1.9 ng/mL        RADIOLOGY:    < from: [facility] Duplex Lower Ext Vein Scan, Bilat (02.26.19 @ 15:13) >  No evidence of deep venous thrombosis or superficial thrombophlebitis in   the bilateral lower extremities.    < from: NM Pulmonary Ventilation/Perfusion Scan (02.25.19 @ 22:42) >  LOW PROBABILITY FOR PULMONARY EMBOLISM.  No evidence of unmatched perfusion defect. Nonsegmental on right lower   lobe superior and medial segment defects; and segmental right lower lobe   anterior, lateral and posterior basal defects, completely matched with   ventilatory defects.      < from: US Retroperitoneal Complete (02.26.19 @ 21:00) >  1 . Incidental cirrhotic appearing liver with multiple indeterminate   masses. Findings suspicious for liver cirrhosis with primary or   metastatic tumors and further evaluation recommended with MRI abdomen   with and without IV contrast.  2. Atrophic kidneys with increased parenchymal echogenicity, suggestive   of medical renal disease.  3. No hydronephrosis.  4. Urinary bladder volume 311 cc. Patient did not void for examination.  5. Mild perihepatic ascites.      < from: CT Abdomen and Pelvis No Cont (02.27.19 @ 17:38) >  1.Status post left gamma nail placement for intertrochanteric fracture   with adjacent 5.8 x 2.8 x 4.4 cm subcutaneous collection consistent with   hematoma/seroma   2. Small-volume abdominal pelvic ascites.        PHYSICAL EXAM:  VITAL SIGNS: Last 24 Hours  T(C): 36.2 (28 Feb 2019 12:50), Max: 36.7 (27 Feb 2019 21:45)  T(F): 97.2 (28 Feb 2019 12:50), Max: 98 (27 Feb 2019 21:45)  HR: 79 (28 Feb 2019 12:50) (72 - 83)  BP: 110/53 (28 Feb 2019 12:50) (80/40 - 133/58)  BP(mean): --  RR: 18 (28 Feb 2019 12:50) (18 - 18)  SpO2: 99% (28 Feb 2019 06:01) (99% - 99%)      GENERAL: patient appears uncomfortable in bed    PULMONARY: decreased breath sounds at the bases - no respiratory distress   CARDIOVASCULAR: Irregular S1S2 - Systolic ejection murmur   GASTROINTESTINAL: Soft, Nontender, Nondistended  MUSCULOSKELETAL:  + Peripheral Pulses, Lower extremity edema       ASSESSMENT & PLAN    89 year old lady with a PMHx of HTN presenting to the ED with difficulty breathing, found to be in new onset Afib in ED    #) New Onset Atrial Fibrillation   -CHADSVASC 4  -Heparin drip stopped as Drop in Hb to 6.4 yesterday - Hb 11 s/p 3 units stable since   -Metoprolol 25 q8 + Cardizem 30 q8  -cardio : Metoprolol as above - Heparin 5000 BID - No long term anticoagulation until liver pathology worked up     #) Hypotension, Anemia Hb 6.4 : Active bleeding ? vs infectious etiology  - Bowel movements normal - no signs of active bleed  - Transfused 3 units so far - Hb stable since transfusions   - CT scan abdomen and pelvis no contrast showed collection next to left gamma nail hematoma vs seroma -> spoke with orthopedics regarding results - per ortho normal finding post-op / chronic / not concerning / not exacerbated by heparin and wouldn't explain drop in Hb   - ID : Nares for ORSA - Cefepime 1 g IV q12 as bibasilar opacities on CT      #) Lower Extremity edema   -BNP 6349  -trop 0.04 -> 0.03 -> 0.03  -TTE (1/2019): EF 60%, moderate AS - Repeat TTE pending read     #) BENI on CKD  -likely prerenal due to hypotension  -baseline Cr 1.0 1/2019 - creatinine 1.6 vs 1.5 yesterday  -Nephro : Off Lasix - Antibiotics per ID - palliative care evaluation    #) Findings consistent with liver cirrhosis and liver masses on US and CT scan - discussed results with son Inocencio, per son patient has no history of chronic liver disease and malignancy - at this time doesn't want workup   - Elevated ALP and AST - INR 1.33 - Bilirubin normal - Albumin low 1.8   - Hepatitis panel : Reactive for hepatitis C     #) Folic Acid Deficiency   Continue with Folic acid.     # DVT prophylaxis : No AC for now   # GI prophylaxis : X  # Activity : Increase as tolerated   # Diet : Nectar thick   # Code : FULL for now - palliative consult placed   # Disposition : Acute STARLA SPEAR   89y   Female    MRN#: [id number]         SUBJECTIVE  Patient is a 89y old Female who presents with a chief complaint of hypotension and SOB (28 Feb 2019 12:46)  Currently admitted to medicine with the primary diagnosis of Atrial fibrillation  Hospital course : Patient was admitted for hypotension - A VQ scan was performed and showed low probability for PE. A venous duplex of the lower extremities showed no evidence of DVT - Patient found to be in Afib not controlled on Cardizem - Transferred to [facility] for monitoring, Metoprolol added - 02/27 drop in Hb to 6.4, 3 units RBC given with improvement of Hb, CT scan non contrast performed and showed a collection (hematoma vs seroma) next to the left gamma nail -> ortho normal post-op finding not acute bleeding 2/2 heparin -Palliative care discussion between palliative car team and family on 02/28   Today is hospital day 3d, and this morning she is stable appears uncomfortable in bed as she is constantly leaning to her right side. Unable to assess active complaints. Yesterday evening bedside debridement performed by Dr. Meléndez    Present Today:           Short Catheter X          Central Line X          IV Fluids X          Drains X      OBJECTIVE  ------------------------------------    PAST MEDICAL & SURGICAL HISTORY  HTN (hypertension)  No significant past surgical history      ALLERGIES:  No Known Allergies      MEDICATIONS:  STANDING MEDICATIONS  ascorbic acid 500 milliGRAM(s) Oral daily  aspirin enteric coated 325 milliGRAM(s) Oral daily  calcium carbonate 1250 mG  + Vitamin D (OsCal 500 + D) 1 Tablet(s) Oral three times a day  cefepime   IVPB 1000 milliGRAM(s) IV Intermittent every 12 hours  chlorhexidine 4% Liquid 1 Application(s) Topical <User Schedule>  diltiazem    Tablet 30 milliGRAM(s) Oral every 8 hours  docusate sodium 100 milliGRAM(s) Oral three times a day  ferrous    sulfate 325 milliGRAM(s) Oral daily  folic acid 1 milliGRAM(s) Oral daily  latanoprost 0.005% Ophthalmic Solution 1 Drop(s) Both EYES at bedtime  metoprolol tartrate 25 milliGRAM(s) Oral every 8 hours  multivitamin 1 Tablet(s) Oral daily    PRN MEDICATIONS  morphine Concentrate 5 milliGRAM(s) Oral every 4 hours PRN  senna 2 Tablet(s) Oral at bedtime PRN        LABS:                        11.7   13.81 )-----------( 161      ( 28 Feb 2019 06:07 )             34.4     02-28    136  |  96<L>  |  111<HH>  ----------------------------<  142<H>  4.6   |  28  |  1.6<H>    Ca    9.2      28 Feb 2019 06:07  Phos  3.0     02-27  Mg     1.9     02-28    TPro  6.3  /  Alb  1.9<L>  /  TBili  0.8  /  DBili  x   /  AST  95<H>  /  ALT  45<H>  /  AlkPhos  170<H>  02-28    PT/INR - ( 28 Feb 2019 06:07 )   PT: 14.30 sec;   INR: 1.25 ratio         PTT - ( 28 Feb 2019 06:07 )  PTT:31.6 sec          CARDIAC MARKERS ( 27 Feb 2019 11:27 )  x     / 0.03 ng/mL / x     / x     / 1.9 ng/mL        RADIOLOGY:    < from: [facility] Duplex Lower Ext Vein Scan, Bilat (02.26.19 @ 15:13) >  No evidence of deep venous thrombosis or superficial thrombophlebitis in   the bilateral lower extremities.    < from: NM Pulmonary Ventilation/Perfusion Scan (02.25.19 @ 22:42) >  LOW PROBABILITY FOR PULMONARY EMBOLISM.  No evidence of unmatched perfusion defect. Nonsegmental on right lower   lobe superior and medial segment defects; and segmental right lower lobe   anterior, lateral and posterior basal defects, completely matched with   ventilatory defects.      < from: US Retroperitoneal Complete (02.26.19 @ 21:00) >  1 . Incidental cirrhotic appearing liver with multiple indeterminate   masses. Findings suspicious for liver cirrhosis with primary or   metastatic tumors and further evaluation recommended with MRI abdomen   with and without IV contrast.  2. Atrophic kidneys with increased parenchymal echogenicity, suggestive   of medical renal disease.  3. No hydronephrosis.  4. Urinary bladder volume 311 cc. Patient did not void for examination.  5. Mild perihepatic ascites.      < from: CT Abdomen and Pelvis No Cont (02.27.19 @ 17:38) >  1.Status post left gamma nail placement for intertrochanteric fracture   with adjacent 5.8 x 2.8 x 4.4 cm subcutaneous collection consistent with   hematoma/seroma   2. Small-volume abdominal pelvic ascites.        PHYSICAL EXAM:  VITAL SIGNS: Last 24 Hours  T(C): 36.2 (28 Feb 2019 12:50), Max: 36.7 (27 Feb 2019 21:45)  T(F): 97.2 (28 Feb 2019 12:50), Max: 98 (27 Feb 2019 21:45)  HR: 79 (28 Feb 2019 12:50) (72 - 83)  BP: 110/53 (28 Feb 2019 12:50) (80/40 - 133/58)  BP(mean): --  RR: 18 (28 Feb 2019 12:50) (18 - 18)  SpO2: 99% (28 Feb 2019 06:01) (99% - 99%)      GENERAL: patient appears uncomfortable in bed    PULMONARY: decreased breath sounds at the bases - no respiratory distress   CARDIOVASCULAR: Irregular S1S2 - Systolic ejection murmur   GASTROINTESTINAL: Soft, Nontender, Nondistended  MUSCULOSKELETAL:  + Peripheral Pulses, Lower extremity edema       ASSESSMENT & PLAN    89 year old lady with a PMHx of HTN presenting to the ED with difficulty breathing, found to be in new onset Afib in ED    #) New Onset Atrial Fibrillation   -CHADSVASC 4  -Heparin drip stopped as Drop in Hb to 6.4 yesterday - Hb 11 s/p 3 units stable since   -Metoprolol 25 q8 + Cardizem 30 q8  -cardio : Metoprolol as above - Heparin 5000 BID - No long term anticoagulation until liver pathology worked up     #) Hypotension, Anemia Hb 6.4 : Active bleeding ? vs infectious etiology  - Bowel movements normal - no signs of active bleed  - Transfused 3 units so far - Hb stable since transfusions   - CT scan abdomen and pelvis no contrast showed collection next to left gamma nail hematoma vs seroma -> spoke with orthopedics regarding results - per ortho normal finding post-op / chronic / not concerning / not exacerbated by heparin and wouldn't explain drop in Hb   - ID : Nares for ORSA - Cefepime 1 g IV q12 as bibasilar opacities on CT      #) Lower Extremity edema   -BNP 6349  -trop 0.04 -> 0.03 -> 0.03  -TTE (1/2019): EF 60%, moderate AS - Repeat TTE pending read     #) BENI on CKD  -likely prerenal due to hypotension  -baseline Cr 1.0 1/2019 - creatinine 1.6 vs 1.5 yesterday  -Nephro : Off Lasix - Antibiotics per ID - palliative care evaluation    #) Findings consistent with liver cirrhosis and liver masses on US and CT scan - discussed results with son Inocencio, per son patient has no history of chronic liver disease and malignancy - at this time doesn't want workup   - Elevated ALP and AST - INR 1.33 - Bilirubin normal - Albumin low 1.8   - Hepatitis panel : Reactive for hepatitis C     #) Folic Acid Deficiency   Continue with Folic acid.     #) Goals of care : discussion between palliative care team and family today - Family would like cardiac status to be optimized - no workup for liver masses - Family agrees that priority is for patient to be comfortable but would like to continue with current treatment and blood draws / transfusions as needed - DNR and DNI - Disposition : Home vs hospice facility     # DVT prophylaxis : No AC for now   # GI prophylaxis : X  # Activity : Increase as tolerated   # Diet : Nectar thick   # Code : DNR / DNI - MOLST form signed and in chart  # Disposition : Home with hospice vs hospice facility pending cardiac optimization

## 2019-02-28 NOTE — CONSULT NOTE ADULT - ASSESSMENT
89 year old female with a PMHx of HTN presenting to the ED with difficulty breathing. She was discharged to [facility] 1/2019 after a L hip fracture. As per the records from the nursing home she has become increasingly short of breath and her legs got more and more swollen.  Vitals at the nursing home showed that the patient was hypotensive in the 80s so they sent her to ED fo further evaluation. In the ER patient was found  to be in new onset atrial fibrillation and was started on heparin gtt. Yesterday patient had a drop in H&H and received 3 units of PRBCs, there was no evidence of GI bleed at this point. patient also underwent debridement of sacral decubitus ulcer. Later on today patient had an episode of bright red bleeding per rectum, moderate in amount with blood clots.  On rectal exam there was evidence of large thrombosed hemorrhoids and fecal impaction.  patient had CT abdomen yesterday that showed left thigh subcutaneous collection suggestive of seroma / hematoma about 5.8 cm. patient was also found to have multiple liver lesions which could be multifocal HCC vs liver mets.  - Lower GI bleed   could be due to hemorrhoidal bleed vs stercoral ulcer secondary to constipation and fecal impaction  Plan:  monitor H&H  transfuse appropriately  Anusol HC supp at bedtime  tap water enema q 6 hrs  give half a galloon of golytely X 1 dose  then patient should be started on Miralax 3 times a day and senna 2 tabs at bedtime.  - HCV Ab + 89 year old female with a PMHx of HTN presenting to the ED with difficulty breathing. She was discharged to [facility] 1/2019 after a L hip fracture. As per the records from the nursing home she has become increasingly short of breath and her legs got more and more swollen.  Vitals at the nursing home showed that the patient was hypotensive in the 80s so they sent her to ED fo further evaluation. In the ER patient was found  to be in new onset atrial fibrillation and was started on heparin gtt. Yesterday patient had a drop in H&H and received 3 units of PRBCs, there was no evidence of GI bleed at this point. patient also underwent debridement of sacral decubitus ulcer. Later on today patient had an episode of bright red bleeding per rectum, moderate in amount with blood clots.  On rectal exam there was evidence of large thrombosed hemorrhoids and fecal impaction.  patient had CT abdomen yesterday that showed left thigh subcutaneous collection suggestive of seroma / hematoma about 5.8 cm. patient was also found to have multiple liver lesions which could be multifocal HCC vs liver mets.  - Lower GI bleed   could be due to hemorrhoidal bleed vs stercoral ulcer secondary to constipation and fecal impaction  Plan:  monitor H&H  transfuse appropriately  Anusol HC supp at bedtime  tap water enema q 6 hrs  give half a galloon of golytely X 1 dose  then patient should be started on Miralax 3 times a day and senna 2 tabs at bedtime.  - patient was found to have + HCV Ab  which could be due to chronic infection vs prior infection that was treated vs false + ve result  check HCV RNA  - Hepatic lesions could be due to HCC vs mets  can't obtain imaging with contrast at this point due to BENI  consider contrast enhanced US  check AFP  - Nodular liver seen on imaging  check fibrosure  check ANABELLA, ASMA, AMA, immunoglobulin, iron study, ceruloplasmin, alpha1 antitrypsin 89 year old female with a PMHx of HTN presenting to the ED with difficulty breathing. She was discharged to [facility] 1/2019 after a L hip fracture. As per the records from the nursing home she has become increasingly short of breath and her legs got more and more swollen.  Vitals at the nursing home showed that the patient was hypotensive in the 80s so they sent her to ED fo further evaluation. In the ER patient was found  to be in new onset atrial fibrillation and was started on heparin gtt. Yesterday patient had a drop in H&H and received 3 units of PRBCs, there was no evidence of GI bleed at this point. patient also underwent debridement of sacral decubitus ulcer. Later on today patient had an episode of bright red bleeding per rectum, moderate in amount with blood clots.  On rectal exam there was evidence of large thrombosed hemorrhoids and fecal impaction.  patient had CT abdomen yesterday that showed left thigh subcutaneous collection suggestive of seroma / hematoma about 5.8 cm. patient was also found to have multiple liver lesions which could be multifocal HCC vs liver mets.  - Lower GI bleed   could be due to hemorrhoidal bleed vs stercoral ulcer secondary to constipation and fecal impaction  Plan:  monitor H&H  transfuse appropriately  Anusol HC supp at bedtime  recommend manual disimpaction  tap water enema q 6 hrs  give half a gallon of golytely X 1 dose  then patient should be started on Miralax 3 times a day and senna 2 tabs at bedtime.  We would only consider doing a flexible sigmoidoscopy if bleeding is clinically significant (drop in hgb, hemodynamic instability) OR if disimpaction has been resolved    - patient was found to have + HCV Ab  could be due to chronic infection vs prior infection that was treated vs false + ve result  check HCV RNA    - Hepatic lesions could be due to HCC vs mets  can't obtain imaging with contrast at this point due to BENI   Would recommend triple phase ct OR MRI with gadolinium once BENI resolves  check AFP, CEA, CA 19-9,   Patient will likely ultimately require a biopsy    - Nodular liver seen on imaging  check fibrosure  check ANABELLA, ASMA, AMA, immunoglobulin, iron study, ceruloplasmin, alpha1 antitrypsin

## 2019-02-28 NOTE — DIETITIAN INITIAL EVALUATION ADULT. - ENERGY NEEDS
calorie  [id number] (MSJ x 1.2-1.5 AF) for pressure ulcer, advanced age  protein 68-74g (1.2-1.3g/kg CBW) for pressure ulcer/ BENI on CKD will monitor renal profile/adjust PRN   fluid 1ml/kcal or per LIP

## 2019-02-28 NOTE — PROGRESS NOTE ADULT - ASSESSMENT
BENI   - prerenal azotemia  hypotension   - better  peripheral edema   - better, but bp's low  severe hypoalbuminemia  cirrhosis  liver masses on sonogram  HFpEF / mod mr and mod as with 55% EF on 1/19 echo  anemia   -brown stool in ED  new afib  PNA  recent left hip fx, s/p orif complicated by post-op BENI    plan:    off lasix  s/p prbc tx  abx per ID  palliative care eval  d/w resident

## 2019-02-28 NOTE — CONSULT NOTE ADULT - SUBJECTIVE AND OBJECTIVE BOX
Chief Complaint:  Patient is a 89y old  Female who presents with a chief complaint of hypotension and SOB (28 Feb 2019 12:46)      HPI:  89 year old female with a PMHx of HTN presenting to the ED with difficulty breathing. She was discharged to [facility] 1/2019 after a L hip fracture. As per the records from the nursing home she has become increasingly short of breath and her legs got more and more swollen.  Vitals at the nursing home showed that the patient was hypotensive in the 80s so they sent her to ED fo further evaluation. In the ER patient was found  to be in new onset atrial fibrillation and was started on heparin gtt. Yesterday patient had a drop in H&H and received 3 units of PRBCs, there was no evidence of GI bleed at this point. Later on today patient had an episode of bright red bleeding per rectum, moderate in amount with blood clots.  On rectal exam there was evidence of large thrombosed hemorrhoids and fecal impaction.      Allergies:  No Known Allergies    Hospital Medications:  ascorbic acid 500 milliGRAM(s) Oral daily  aspirin enteric coated 325 milliGRAM(s) Oral daily  calcium carbonate 1250 mG  + Vitamin D (OsCal 500 + D) 1 Tablet(s) Oral three times a day  cefepime   IVPB 1000 milliGRAM(s) IV Intermittent every 12 hours  chlorhexidine 4% Liquid 1 Application(s) Topical <User Schedule>  diltiazem    Tablet 30 milliGRAM(s) Oral every 8 hours  docusate sodium 100 milliGRAM(s) Oral three times a day  ferrous    sulfate 325 milliGRAM(s) Oral daily  folic acid 1 milliGRAM(s) Oral daily  latanoprost 0.005% Ophthalmic Solution 1 Drop(s) Both EYES at bedtime  metoprolol tartrate 25 milliGRAM(s) Oral every 8 hours  morphine Concentrate 5 milliGRAM(s) Oral every 4 hours PRN  multivitamin 1 Tablet(s) Oral daily  senna 2 Tablet(s) Oral at bedtime PRN      PMHX/PSHX:  HTN (hypertension)  No pertinent past medical history  No significant past surgical history      Family history:  No pertinent family history in first degree relatives    ROS:   Unable to obtain , patient is hard of hearing and poor historian.      PHYSICAL EXAM:   Vital Signs:  Vital Signs Last 24 Hrs  T(C): 35.7 (28 Feb 2019 16:50), Max: 36.7 (27 Feb 2019 21:45)  T(F): 96.2 (28 Feb 2019 16:50), Max: 98 (27 Feb 2019 21:45)  HR: 79 (28 Feb 2019 16:50) (72 - 83)  BP: 101/52 (28 Feb 2019 18:36) (80/40 - 133/58)  BP(mean): --  RR: 18 (28 Feb 2019 16:50) (18 - 18)  SpO2: 99% (28 Feb 2019 06:01) (99% - 99%)  Daily Height in cm: 157.4 (28 Feb 2019 09:00)    Daily     GENERAL:  Appears stated age, well-groomed, well-nourished, no distress  HEENT:  NC/AT,  conjunctivae clear and pink, no thyromegaly, nodules, adenopathy, no JVD, sclera -anicteric  CHEST:  Full & symmetric excursion, no increased effort, breath sounds clear  HEART:  Regular rhythm, S1, S2, no murmur/rub/S3/S4, no abdominal bruit, no edema  ABDOMEN:  Soft, non-tender, non-distended, normoactive bowel sounds,  no masses ,no hepato-splenomegaly,   Rectal exam showed large thrombosed hemorrhoids with brown stool impacted in the rectum.  EXTEREMITIES:  no cyanosis,clubbing or edema  SKIN:  No rash/erythema/ecchymoses/petechiae/wounds/abscess/warm/dry  NEURO:  Alert, oriented, no asterixis, no tremor, no encephalopathy    LABS:                        11.7   12.64 )-----------( 180      ( 28 Feb 2019 17:05 )             34.0     02-28    136  |  96<L>  |  111<HH>  ----------------------------<  142<H>  4.6   |  28  |  1.6<H>    Ca    9.2      28 Feb 2019 06:07  Phos  3.0     02-27  Mg     1.9     02-28    TPro  6.3  /  Alb  1.9<L>  /  TBili  0.8  /  DBili  x   /  AST  95<H>  /  ALT  45<H>  /  AlkPhos  170<H>  02-28    LIVER FUNCTIONS - ( 28 Feb 2019 06:07 )  Alb: 1.9 g/dL / Pro: 6.3 g/dL / ALK PHOS: 170 U/L / ALT: 45 U/L / AST: 95 U/L / GGT: x           PT/INR - ( 28 Feb 2019 17:05 )   PT: 14.40 sec;   INR: 1.25 ratio         PTT - ( 28 Feb 2019 17:05 )  PTT:30.5 sec        Imaging:

## 2019-02-28 NOTE — CONSULT NOTE ADULT - SUBJECTIVE AND OBJECTIVE BOX
REQUESTED OF: DR Medellin    CLINICAL ISSUE TO BE EVALUATED BY CONSULTANT: goals of care and symptom management        STARLA SPEAR 89yFemale  HPI:  89 year old lady with a PMHx of HTN presenting to the ED with difficulty breathing. She was discharged to [facility] 1/2019 after a L hip fracture. As per the records from the nursing home she has become increasingly short of breath and her legs got more and more swollen.  Vitals at the nursing home showed that the patient was hypotensive in the 80s so they sent her to ED fo further evaluation. Denied HA, dizziness, cp, palpitations, back pain, rash, numbness, tingling, weakness. She does note that her legs are swollen but doesn't endorse any SOB herself.    In ED, bp 97/86  HR 74  RR 20 T 96.5F 100% on RA.  She was found to be hypokalemic 2.5 and was found to be in new onset atrial fibrillation and was started on heparin gtt. Lactate 3.8, trop 0.04, BNP 6349. PE study was not done due to an BENI. (25 Feb 2019 16:32)        PAST MEDICAL & SURGICAL HISTORY:  HTN (hypertension)  No significant past surgical history        PHYSICAL EXAM:      Constitutional: pt is debilitated and elderly    Respiratory: on nasal cannula O2, diminished B/L BS    Cardiovascular: (-) JVD    Gastrointestinal: soft NT (+) BS    Genitourinary: incontinent of urine    Extremities: noted with edema and contractures    Neurological: responsive to verbal and tactile stimuli    Skin: stage 4 sacral pressure ulcer      T(C): 36.7, Max: 36.7 (21:45)  HR: 83 (72 - 99)  BP: 133/58 (80/40 - 133/58)  RR: 18 (18 - 18)  SpO2: 99% (99% - 99%)      LABS/STUDIES:  02-28    136  |  96<L>  |  111<HH>  ----------------------------<  142<H>  4.6   |  28  |  1.6<H>    Ca    9.2      28 Feb 2019 06:07  Phos  3.0     02-27  Mg     1.9     02-28    TPro  6.3  /  Alb  1.9<L>  /  TBili  0.8  /  DBili  x   /  AST  95<H>  /  ALT  45<H>  /  AlkPhos  170<H>  02-28                            11.7   13.81 )-----------( 161      ( 28 Feb 2019 06:07 )             34.4       MEDICATIONS  (STANDING):  ascorbic acid 500 milliGRAM(s) Oral daily  aspirin enteric coated 325 milliGRAM(s) Oral daily  calcium carbonate 1250 mG  + Vitamin D (OsCal 500 + D) 1 Tablet(s) Oral three times a day  cefepime   IVPB 1000 milliGRAM(s) IV Intermittent every 12 hours  chlorhexidine 4% Liquid 1 Application(s) Topical <User Schedule>  diltiazem    Tablet 30 milliGRAM(s) Oral every 8 hours  docusate sodium 100 milliGRAM(s) Oral three times a day  ferrous    sulfate 325 milliGRAM(s) Oral daily  folic acid 1 milliGRAM(s) Oral daily  latanoprost 0.005% Ophthalmic Solution 1 Drop(s) Both EYES at bedtime  metoprolol tartrate 25 milliGRAM(s) Oral every 8 hours  multivitamin 1 Tablet(s) Oral daily    MEDICATIONS  (PRN):  oxyCODONE    IR 5 milliGRAM(s) Oral every 8 hours PRN Severe Pain (7 - 10)  senna 2 Tablet(s) Oral at bedtime PRN Constipation              PPS (Palliative Performance Scale): 20 REQUESTED OF: DR Medellin    CLINICAL ISSUE TO BE EVALUATED BY CONSULTANT: goals of care and symptom management        STARLA SPEAR 89yFemale  HPI:  89 year old lady with a PMHx of HTN presenting to the ED with difficulty breathing. She was discharged to [facility] 1/2019 after a L hip fracture. As per the records from the nursing home she has become increasingly short of breath and her legs got more and more swollen.  Vitals at the nursing home showed that the patient was hypotensive in the 80s so they sent her to ED fo further evaluation. Denied HA, dizziness, cp, palpitations, back pain, rash, numbness, tingling, weakness. She does note that her legs are swollen but doesn't endorse any SOB herself.    In ED, bp 97/86  HR 74  RR 20 T 96.5F 100% on RA.  She was found to be hypokalemic 2.5 and was found to be in new onset atrial fibrillation and was started on heparin gtt. Lactate 3.8, trop 0.04, BNP 6349. PE study was not done due to an BENI. (25 Feb 2019 16:32)        PAST MEDICAL & SURGICAL HISTORY:  HTN (hypertension)  No significant past surgical history        PHYSICAL EXAM:      Constitutional: pt is debilitated and elderly    Respiratory: on nasal cannula O2, diminished B/L BS    Cardiovascular: (-) JVD    Gastrointestinal: soft NT (+) BS    Genitourinary: incontinent of urine    Extremities: noted with edema and contractures    Neurological: responsive to verbal and tactile stimuli    Skin: stage 4 sacral pressure ulcer areas of breakdown on LLE below ORIF site       T(C): 36.7, Max: 36.7 (21:45)  HR: 83 (72 - 99)  BP: 133/58 (80/40 - 133/58)  RR: 18 (18 - 18)  SpO2: 99% (99% - 99%)      LABS/STUDIES:  02-28    136  |  96<L>  |  111<HH>  ----------------------------<  142<H>  4.6   |  28  |  1.6<H>    Ca    9.2      28 Feb 2019 06:07  Phos  3.0     02-27  Mg     1.9     02-28    TPro  6.3  /  Alb  1.9<L>  /  TBili  0.8  /  DBili  x   /  AST  95<H>  /  ALT  45<H>  /  AlkPhos  170<H>  02-28                            11.7   13.81 )-----------( 161      ( 28 Feb 2019 06:07 )             34.4       MEDICATIONS  (STANDING):  ascorbic acid 500 milliGRAM(s) Oral daily  aspirin enteric coated 325 milliGRAM(s) Oral daily  calcium carbonate 1250 mG  + Vitamin D (OsCal 500 + D) 1 Tablet(s) Oral three times a day  cefepime   IVPB 1000 milliGRAM(s) IV Intermittent every 12 hours  chlorhexidine 4% Liquid 1 Application(s) Topical <User Schedule>  diltiazem    Tablet 30 milliGRAM(s) Oral every 8 hours  docusate sodium 100 milliGRAM(s) Oral three times a day  ferrous    sulfate 325 milliGRAM(s) Oral daily  folic acid 1 milliGRAM(s) Oral daily  latanoprost 0.005% Ophthalmic Solution 1 Drop(s) Both EYES at bedtime  metoprolol tartrate 25 milliGRAM(s) Oral every 8 hours  multivitamin 1 Tablet(s) Oral daily    MEDICATIONS  (PRN):  oxyCODONE    IR 5 milliGRAM(s) Oral every 8 hours PRN Severe Pain (7 - 10)  senna 2 Tablet(s) Oral at bedtime PRN Constipation              PPS (Palliative Performance Scale): 20

## 2019-02-28 NOTE — GOALS OF CARE CONVERSATION - PERSONAL ADVANCE DIRECTIVE - CONVERSATION/DISCUSSION
Diagnosis/Hospice Referral/Chaplaincy Referral/Prognosis/MOLST Discussed/Treatment Options/Palliative Care Referral

## 2019-02-28 NOTE — CONSULT NOTE ADULT - ASSESSMENT
89yFemale being evaluated for goals of care and symptom management. Pt sent from SNF for dyspnea. (+) NO AFIB,PE ruled out. Also Dx with liver masses and cirrhosis. BENI as well. Son Inocencio wanted to speak to palliative care.           Recommendations: 89yFemale being evaluated for goals of care and symptom management. Pt sent from SNF for dyspnea. (+) NO AFIB,PE ruled out. Also Dx with liver masses and cirrhosis. BENI as well. Son Inocencio wanted to speak to palliative care. Palliative team met with Inocencio (son) his wife and daughter (pts grandaughter). Palliative attending, medical resident, SW, NP and student present.    Reviewed overall prognosis, discussed hepatic mass/lesions. And her cardiac condition along with overall decline since hip fx. Family understood and did not want any work up of liver mass. Also discussed DNR/DNI. And we discussed hospice care, home vs SNF. They want her to be comfortable as main goal. They will consider starting comfort measures only in hospital.          Recommendations:  DNR/DNI  hospice c/s  Roxanol 5mg SL Q 4 HRS PRN for pain  family wants to purse rate control for heart if possible  considering CMO 89yFemale being evaluated for goals of care and symptom management. Pt sent from SNF for dyspnea. (+) NO AFIB,PE ruled out. Also Dx with liver masses and cirrhosis. BENI as well. Son Inocencio wanted to speak to palliative care. Palliative team met with Inocencio (son) his wife and daughter (pts grandaughter). Palliative attending, medical resident, SW, NP and student present.    Reviewed overall prognosis, discussed hepatic mass/lesions. And her cardiac condition along with overall decline since hip fx. Family understood and did not want any work up of liver mass. Also discussed DNR/DNI. And we discussed hospice care, home vs SNF. They want her to be comfortable as main goal. They will consider starting comfort measures only in hospital.          Recommendations:  DNR/DNI  hospice c/s  Roxanol 5mg SL Q 4 HRS PRN for pain  family wants to pursue rate control for heart if possible  considering CMO    We will follow  [phone]

## 2019-02-28 NOTE — CONSULT NOTE ADULT - REASON FOR ADMISSION
hypotension and SOB

## 2019-02-28 NOTE — PROGRESS NOTE ADULT - SUBJECTIVE AND OBJECTIVE BOX
NEPHROLOGY FOLLOW UP NOTE    pt seen and examined  d/w resident  son requesting less agressive care per Dr. chow note  no new events  bp's better off lasix      PAST MEDICAL & SURGICAL HISTORY:  HTN (hypertension)  No significant past surgical history    Allergies:  No Known Allergies    Home Medications Reviewed    SOCIAL HISTORY:  Denies ETOH,Smoking,   FAMILY HISTORY:  No pertinent family history in first degree relatives        REVIEW OF SYSTEMS:  poor historian  All other review of systems is negative unless indicated above.    PHYSICAL EXAM:  NAD  awake and alert  frail  [place]  dry mm  b/l rhonchi  rrr  soft, nt  2+ edema    Hospital Medications:   MEDICATIONS  (STANDING):  ascorbic acid 500 milliGRAM(s) Oral daily  aspirin enteric coated 325 milliGRAM(s) Oral daily  calcium carbonate 1250 mG  + Vitamin D (OsCal 500 + D) 1 Tablet(s) Oral three times a day  cefepime   IVPB 1000 milliGRAM(s) IV Intermittent every 12 hours  chlorhexidine 4% Liquid 1 Application(s) Topical <User Schedule>  diltiazem    Tablet 30 milliGRAM(s) Oral every 8 hours  docusate sodium 100 milliGRAM(s) Oral three times a day  ferrous    sulfate 325 milliGRAM(s) Oral daily  folic acid 1 milliGRAM(s) Oral daily  latanoprost 0.005% Ophthalmic Solution 1 Drop(s) Both EYES at bedtime  metoprolol tartrate 25 milliGRAM(s) Oral every 8 hours  multivitamin 1 Tablet(s) Oral daily        VITALS:  T(F): 98 (02-28-19 @ 06:01), Max: 98 (02-27-19 @ 21:45)  HR: 83 (02-28-19 @ 06:01)  BP: 133/58 (02-28-19 @ 06:01)  RR: 18 (02-28-19 @ 06:01)  SpO2: 99% (02-28-19 @ 06:01)  Wt(kg): --    02-26 @ 07:01  -  02-27 @ 07:00  --------------------------------------------------------  IN: 45 mL / OUT: 0 mL / NET: 45 mL    02-27 @ 07:01  -  02-28 @ 07:00  --------------------------------------------------------  IN: 492 mL / OUT: 0 mL / NET: 492 mL    02-28 @ 07:01  -  02-28 @ 10:09  --------------------------------------------------------  IN: 360 mL / OUT: 0 mL / NET: 360 mL      Height (cm): 157.4 (02-28 @ 09:00)  Weight (kg): 57.1 (02-27 @ 18:37)  BMI (kg/m2): 23 (02-28 @ 09:00)  BSA (m2): 1.57 (02-28 @ 09:00)    LABS:  02-28    136  |  96<L>  |  111<HH>  ----------------------------<  142<H>  4.6   |  28  |  1.6<H>    Ca    9.2      28 Feb 2019 06:07  Phos  3.0     02-27  Mg     1.9     02-28    TPro  6.3  /  Alb  1.9<L>  /  TBili  0.8  /  DBili      /  AST  95<H>  /  ALT  45<H>  /  AlkPhos  170<H>  02-28                          11.0   12.85 )-----------( 164      ( 28 Feb 2019 02:08 )             32.6       Urine Studies:        RADIOLOGY & ADDITIONAL STUDIES:

## 2019-02-28 NOTE — PROGRESS NOTE ADULT - SUBJECTIVE AND OBJECTIVE BOX
ARTECA, STARLA  89y, Female      OVERNIGHT EVENTS:    no fevers, alert, NAD    VITALS:  T(F): 98, Max: 98 (02-27-19 @ 21:45)  HR: 83  BP: 133/58  RR: 18Vital Signs Last 24 Hrs  T(C): 36.7 (28 Feb 2019 06:01), Max: 36.7 (27 Feb 2019 21:45)  T(F): 98 (28 Feb 2019 06:01), Max: 98 (27 Feb 2019 21:45)  HR: 83 (28 Feb 2019 06:01) (72 - 113)  BP: 133/58 (28 Feb 2019 06:01) (77/40 - 133/58)  BP(mean): --  RR: 18 (28 Feb 2019 06:01) (17 - 18)  SpO2: 99% (28 Feb 2019 06:01) (99% - 99%)    TESTS & MEASUREMENTS:                        11.0   12.85 )-----------( 164      ( 28 Feb 2019 02:08 )             32.6     02-28    136  |  94<L>  |  110<HH>  ----------------------------<  157<H>  4.5   |  29  |  1.5    Ca    9.2      28 Feb 2019 02:08  Phos  3.0     02-27  Mg     1.8     02-26    TPro  5.9<L>  /  Alb  1.8<L>  /  TBili  0.6  /  DBili  x   /  AST  79<H>  /  ALT  39  /  AlkPhos  167<H>  02-27    LIVER FUNCTIONS - ( 27 Feb 2019 05:47 )  Alb: 1.8 g/dL / Pro: 5.9 g/dL / ALK PHOS: 167 U/L / ALT: 39 U/L / AST: 79 U/L / GGT: x             Culture - Blood (collected 02-25-19 @ 12:54)  Source: .Blood Blood  Preliminary Report (02-26-19 @ 23:01):    No growth to date.            RADIOLOGY & ADDITIONAL TESTS:    ANTIBIOTICS:  cefepime   IVPB 1000 milliGRAM(s) IV Intermittent every 12 hours

## 2019-02-28 NOTE — GOALS OF CARE CONVERSATION - PERSONAL ADVANCE DIRECTIVE - CONVERSATION DETAILS
Palliative Care discussed pt.'s overall medical condition and treatment options.  All questions answered in detail.  Family has a good understanding of pt.'s condition, and given pt.'s comorbidities, age and frail state, family is clear that they would not want to pursue work-up for liver mass and do not want any invasive or aggressive measures.  Options of continued medical management vs. comfort care addressed.  Family agrees to DNR/DNI, but will continue whether to begin comfort measures in hospital once heart rate is controlled.  Family agree to hospice consult.  Will follow for symptom management and ongoing goals of care conversations.

## 2019-02-28 NOTE — PROGRESS NOTE ADULT - ASSESSMENT
moderate to severe AS with ai and mild moderate MR,. TR  mild pulmonary HTN  A.fib, BP borderline low at 100-110 mmHg, tachycardia  CKD  Anemia, bleeding, no A.C    in the morning I advised to start cardizem 30 q 8 h and continue with metoprolol 25 q8 h  at night at the time of the writing the note HR has been well controlled and BP has been better responded.  current Rx, no anticoagulation

## 2019-02-28 NOTE — PROGRESS NOTE ADULT - ASSESSMENT
IMPRESSION:  CT with bibasilar opacities  PE with diffuse wheezes  Will initiate treatment as GNR PNA  BCx NTD  WBC 12.8    RECOMMENDATIONS  Nares for ORSA  Cefepime 1 gm iv q12h

## 2019-02-28 NOTE — PROGRESS NOTE ADULT - SUBJECTIVE AND OBJECTIVE BOX
Patient was seen and examined. Spoke with RN. Chart reviewed.  No events overnight.  Vital Signs Last 24 Hrs  T(F): 98 (28 Feb 2019 06:01), Max: 98 (27 Feb 2019 21:45)  HR: 83 (28 Feb 2019 06:01) (72 - 113)  BP: 133/58 (28 Feb 2019 06:01) (77/40 - 133/58)  SpO2: 99% (28 Feb 2019 06:01) (99% - 99%)  MEDICATIONS  (STANDING):  ascorbic acid 500 milliGRAM(s) Oral daily  aspirin enteric coated 325 milliGRAM(s) Oral daily  calcium carbonate 1250 mG  + Vitamin D (OsCal 500 + D) 1 Tablet(s) Oral three times a day  cefepime   IVPB 1000 milliGRAM(s) IV Intermittent every 12 hours  chlorhexidine 4% Liquid 1 Application(s) Topical <User Schedule>  diltiazem    Tablet 30 milliGRAM(s) Oral every 8 hours  docusate sodium 100 milliGRAM(s) Oral three times a day  ferrous    sulfate 325 milliGRAM(s) Oral daily  folic acid 1 milliGRAM(s) Oral daily  latanoprost 0.005% Ophthalmic Solution 1 Drop(s) Both EYES at bedtime  metoprolol tartrate 25 milliGRAM(s) Oral every 8 hours  multivitamin 1 Tablet(s) Oral daily    MEDICATIONS  (PRN):  oxyCODONE    IR 5 milliGRAM(s) Oral every 8 hours PRN Severe Pain (7 - 10)  senna 2 Tablet(s) Oral at bedtime PRN Constipation    Labs:                        11.0   12.85 )-----------( 164      ( 28 Feb 2019 02:08 )             32.6                         6.4    11.02 )-----------( 197      ( 27 Feb 2019 05:47 )             19.7     28 Feb 2019 06:07    136    |  96     |  x      ----------------------------<  142    4.6     |  28     |  1.6    28 Feb 2019 02:08    136    |  94     |  110    ----------------------------<  157    4.5     |  29     |  1.5      Ca    9.2        28 Feb 2019 06:07  Ca    9.2        28 Feb 2019 02:08  Phos  3.0       27 Feb 2019 05:47  Mg     1.9       28 Feb 2019 06:07  Mg     1.8       26 Feb 2019 09:26    TPro  6.3    /  Alb  1.9    /  TBili  0.8    /  DBili  x      /  AST  95     /  ALT  45     /  AlkPhos  170    28 Feb 2019 06:07  TPro  5.9    /  Alb  1.8    /  TBili  0.6    /  DBili  x      /  AST  79     /  ALT  39     /  AlkPhos  167    27 Feb 2019 05:47    PT/INR - ( 28 Feb 2019 06:07 )   PT: 14.30 sec;   INR: 1.25 ratio         PTT - ( 28 Feb 2019 06:07 )  PTT:31.6 sec      Culture - Blood (collected 25 Feb 2019 12:54)  Source: .Blood Blood  Preliminary Report (26 Feb 2019 23:01):    No growth to date.      General: comfortable, NAD  Neurology:  nonfocal  Head:  Normocephalic, atraumatic  ENT:  Mucosa moist, no ulcerations  Neck:  Supple, no JVD,   Skin: stage 4 as per RN  Resp: CTA B/L  CV: RRR, S1S2,   GI: Soft, NT, bowel sounds  MS: No edema, + peripheral pulses, FROM all 4 extremity      A/P:  89 year old lady with a PMHx of HTN presenting to the ED with difficulty breathing, found to be in new onset Afib in ED, also moderate AS    #) New Onset Atrial Fibrillation   -CHADSVASC 4  -Heparin drip stopped as Drop in Hb   - rate fluctuating between 100 and 120  -cardio : - Heparin 5000 BID - No long term anticoagulation until liver pathology worked up   - cardiology f/u daily    - foloow CBC closely    GI f/u    As per ID- tx with IV abx for possible GNR PNA      Findings consistent with liver cirrhosis and liver masses on US and CT scan - discussed results with son Inocencio, per son patient has no history of chronic liver disease and malignancy - at this time doesn't want workup prefers to wait until cardiac issue resolved before making final decision - wants palliative on board   - Hepatitis panel sent     Palliative care eval    Burn eval for decubitus      DVT prophylaxis  Decubitus prevention and treatment - all measures as per RN protocol  Please call or text me with any questions or updates Patient was seen and examined. Spoke with RN. Chart reviewed.  No events overnight.  Vital Signs Last 24 Hrs  T(F): 98 (28 Feb 2019 06:01), Max: 98 (27 Feb 2019 21:45)  HR: 83 (28 Feb 2019 06:01) (72 - 113)  BP: 133/58 (28 Feb 2019 06:01) (77/40 - 133/58)  SpO2: 99% (28 Feb 2019 06:01) (99% - 99%)  MEDICATIONS  (STANDING):  ascorbic acid 500 milliGRAM(s) Oral daily  aspirin enteric coated 325 milliGRAM(s) Oral daily  calcium carbonate 1250 mG  + Vitamin D (OsCal 500 + D) 1 Tablet(s) Oral three times a day  cefepime   IVPB 1000 milliGRAM(s) IV Intermittent every 12 hours  chlorhexidine 4% Liquid 1 Application(s) Topical <User Schedule>  diltiazem    Tablet 30 milliGRAM(s) Oral every 8 hours  docusate sodium 100 milliGRAM(s) Oral three times a day  ferrous    sulfate 325 milliGRAM(s) Oral daily  folic acid 1 milliGRAM(s) Oral daily  latanoprost 0.005% Ophthalmic Solution 1 Drop(s) Both EYES at bedtime  metoprolol tartrate 25 milliGRAM(s) Oral every 8 hours  multivitamin 1 Tablet(s) Oral daily    MEDICATIONS  (PRN):  oxyCODONE    IR 5 milliGRAM(s) Oral every 8 hours PRN Severe Pain (7 - 10)  senna 2 Tablet(s) Oral at bedtime PRN Constipation    Labs:                        11.0   12.85 )-----------( 164      ( 28 Feb 2019 02:08 )             32.6                         6.4    11.02 )-----------( 197      ( 27 Feb 2019 05:47 )             19.7     28 Feb 2019 06:07    136    |  96     |  x      ----------------------------<  142    4.6     |  28     |  1.6    28 Feb 2019 02:08    136    |  94     |  110    ----------------------------<  157    4.5     |  29     |  1.5      Ca    9.2        28 Feb 2019 06:07  Ca    9.2        28 Feb 2019 02:08  Phos  3.0       27 Feb 2019 05:47  Mg     1.9       28 Feb 2019 06:07  Mg     1.8       26 Feb 2019 09:26    TPro  6.3    /  Alb  1.9    /  TBili  0.8    /  DBili  x      /  AST  95     /  ALT  45     /  AlkPhos  170    28 Feb 2019 06:07  TPro  5.9    /  Alb  1.8    /  TBili  0.6    /  DBili  x      /  AST  79     /  ALT  39     /  AlkPhos  167    27 Feb 2019 05:47    PT/INR - ( 28 Feb 2019 06:07 )   PT: 14.30 sec;   INR: 1.25 ratio         PTT - ( 28 Feb 2019 06:07 )  PTT:31.6 sec      Culture - Blood (collected 25 Feb 2019 12:54)  Source: .Blood Blood  Preliminary Report (26 Feb 2019 23:01):    No growth to date.      General: comfortable, NAD  Neurology:  nonfocal  Head:  Normocephalic, atraumatic  ENT:  Mucosa moist, no ulcerations  Neck:  Supple, no JVD,   Skin: stage 4 as per RN  Resp: CTA B/L  CV: RRR, S1S2,   GI: Soft, NT, bowel sounds  MS: No edema, + peripheral pulses, FROM all 4 extremity      A/P:  89 year old lady with a PMHx of HTN presenting to the ED with difficulty breathing, found to be in new onset Afib in ED, also moderate AS    #) New Onset Atrial Fibrillation   -CHADSVASC 4  -Heparin drip stopped as Drop in Hb   - rate fluctuating between 100 and 120  -cardio : - Heparin 5000 BID - No long term anticoagulation until liver pathology worked up   - cardiology f/u daily    - foloow CBC closely    GI f/u    As per ID- tx with IV abx for possible GNR PNA      Findings consistent with liver cirrhosis and liver masses on US and CT scan - discussed results with son Inocencio, per son patient has no history of chronic liver disease and malignancy - at this time doesn't want workup prefers to wait until cardiac issue resolved before making final decision - wants palliative on board   - Hepatitis panel sent     Palliative care eval    Burn f/u for decubitus      DVT prophylaxis  Decubitus prevention and treatment - all measures as per RN protocol  Please call or text me with any questions or updates

## 2019-02-28 NOTE — DIETITIAN INITIAL EVALUATION ADULT. - OTHER INFO
initial assessment for pressure ulcer. Pt. arrived on unit 2/27, screened 2/28. P/w: hypotension, SOB.  New Onset Atrial Fibrillation- CHADSVASC 4. Hypotension, Anemia Hb 6.4 : Active bleeding ? s/p blood transfusion. BENI on CKD- likely pre renal. Findings consistent with liver cirrhosis and liver masses on US and CT scan- pending decision regarding further work up. Palliative consult pending.

## 2019-02-28 NOTE — CHART NOTE - NSCHARTNOTEFT_GEN_A_CORE
Palliative Care Biopsychosocial Assessment: Pt. is a 89 year old,  female who presents with liver cirrhosis w/ masses, a-fib, and pressure ulcer.  Pt. has significant medical hx including recent left hip fx s/p ORIF after mechanical fall, HTN and pressure sores; family describes a steady decline for past month after hip fx.  Prior to this incident pt. was mostly independent and functional at home.  After ORIF, pt. went to rehab at [facility]; son states he has begun medicaid application process.  Pt.'s surrogate is son, Inocencio, and has extended family of in-laws and grandchildren involved in pt.'s care.  Please see [facility] note regarding discussion with family about advanced directives and goals of care.  Family appears to have a good understanding of pt.'s medical condition and are coping adaptively.  Hospice consult.  Family will consider chaplaincy referral.  Support provided; will follow.  DNR/I status; MOLST completed.       Patient Coping Status:       [   ]   coping well        [   ]    coping with  some difficulty       [   ]   difficulty coping     [  x ]   other - pt. lethargic, unable to assess                                                   Patient Emotional Status:     [   ]   anxious         [   ]   depressed           [   ]  overwhelmed          [   ]   angry         [   ]accepting       [   ]   not accepting           [  x ]   other - pt. lethargic, unable to assess                                         Patient Mental Status:      [   ]   alert              [   ]   oriented         [    ]   confused         [ x  ] lethargic         [   ]   non-responsive   [   ]   other     Advance Directives:     [ x  ]    Health Care Surrogate: Name: Inocencio Naranjo                            [   ]    Health Care Proxy: Name:   [ x  ]    MOLST  [   ]    Living Will  [ x  ]    DNR  [  x ]    DNI    Patient Needs:     [   ]   Supportive Counseling                  [   ]   Family Meeting            [   ]    Education                           [  x ]   Advance Care Planning         Caregiver Name: Inocencio Naranjo  Caregiver needs:     [   ]   Supportive Counseling      [   ]   Family Conference      [ x  ]   Education    [   ]   Other     Referral:      [   ]   Community Resources         [   ]   Cancer Support Group     [  x ]    Hospice       [   ]   Bereavement support     [   ]   Pastoral Care      [   ]  [facility] [place]       [   ]  Child Life Services     [   ]   Other                    Spectra #: [phone]

## 2019-03-01 NOTE — PROGRESS NOTE ADULT - SUBJECTIVE AND OBJECTIVE BOX
NEPHROLOGY FOLLOW UP NOTE    pt seen and examined  d/w resident  + bloody bm  bp's low at times  son receptive to hospice      PAST MEDICAL & SURGICAL HISTORY:  HTN (hypertension)  No significant past surgical history    Allergies:  No Known Allergies    Home Medications Reviewed    SOCIAL HISTORY:  Denies ETOH,Smoking,   FAMILY HISTORY:  No pertinent family history in first degree relatives        REVIEW OF SYSTEMS:  poor historian  All other review of systems is negative unless indicated above.    advance care planning and advance care directives reviewed adn discussed    PHYSICAL EXAM:  NAD  awake and alert  frail  [place]  dry mm  b/l rhonchi  rrr  soft, nt  2+ edema    Hospital Medications:   MEDICATIONS  (STANDING):  ascorbic acid 500 milliGRAM(s) Oral daily  aspirin enteric coated 325 milliGRAM(s) Oral daily  calcium carbonate 1250 mG  + Vitamin D (OsCal 500 + D) 1 Tablet(s) Oral three times a day  cefepime   IVPB 1000 milliGRAM(s) IV Intermittent every 12 hours  chlorhexidine 4% Liquid 1 Application(s) Topical <User Schedule>  diltiazem    Tablet 30 milliGRAM(s) Oral every 8 hours  docusate sodium 100 milliGRAM(s) Oral three times a day  ferrous    sulfate 325 milliGRAM(s) Oral daily  folic acid 1 milliGRAM(s) Oral daily  hydrocortisone hemorrhoidal Suppository 1 Suppository(s) Rectal at bedtime  latanoprost 0.005% Ophthalmic Solution 1 Drop(s) Both EYES at bedtime  metoprolol tartrate 25 milliGRAM(s) Oral every 8 hours  multivitamin 1 Tablet(s) Oral daily  polyethylene glycol 3350 17 Gram(s) Oral <User Schedule>        VITALS:  T(F): 97 (03-01-19 @ 05:39), Max: 97 (03-01-19 @ 05:39)  HR: 57 (03-01-19 @ 13:14)  BP: 95/52 (03-01-19 @ 13:14)  RR: 18 (03-01-19 @ 13:14)  SpO2: --  Wt(kg): --    02-27 @ 07:01  -  02-28 @ 07:00  --------------------------------------------------------  IN: 492 mL / OUT: 0 mL / NET: 492 mL    02-28 @ 07:01  -  03-01 @ 07:00  --------------------------------------------------------  IN: 360 mL / OUT: 0 mL / NET: 360 mL          LABS:  03-01    137  |  97<L>  |  106<HH>  ----------------------------<  122<H>  4.3   |  30  |  1.5    Ca    9.6      01 Mar 2019 07:04  Mg     2.0     03-01    TPro  6.5  /  Alb  1.9<L>  /  TBili  0.8  /  DBili      /  AST  108<H>  /  ALT  49<H>  /  AlkPhos  189<H>  03-01                          11.4   14.19 )-----------( 168      ( 01 Mar 2019 07:04 )             34.6       Urine Studies:        RADIOLOGY & ADDITIONAL STUDIES:

## 2019-03-01 NOTE — PROGRESS NOTE ADULT - SUBJECTIVE AND OBJECTIVE BOX
RAINER, STARLA  89y, Female      OVERNIGHT EVENTS:    no fevers, her usual self    VITALS:  T(F): 97, Max: 97.2 (02-28-19 @ 12:50)  HR: 72  BP: 92/59  RR: 18Vital Signs Last 24 Hrs  T(C): 36.1 (01 Mar 2019 05:39), Max: 36.2 (28 Feb 2019 12:50)  T(F): 97 (01 Mar 2019 05:39), Max: 97.2 (28 Feb 2019 12:50)  HR: 72 (01 Mar 2019 05:39) (72 - 79)  BP: 92/59 (01 Mar 2019 05:39) (92/59 - 122/58)  BP(mean): --  RR: 18 (01 Mar 2019 05:39) (18 - 20)  SpO2: --    TESTS & MEASUREMENTS:                        11.4   14.19 )-----------( 168      ( 01 Mar 2019 07:04 )             34.6     03-01    137  |  97<L>  |  106<HH>  ----------------------------<  122<H>  4.3   |  30  |  1.5    Ca    9.6      01 Mar 2019 07:04  Mg     2.0     03-01    TPro  6.5  /  Alb  1.9<L>  /  TBili  0.8  /  DBili  x   /  AST  108<H>  /  ALT  49<H>  /  AlkPhos  189<H>  03-01    LIVER FUNCTIONS - ( 01 Mar 2019 07:04 )  Alb: 1.9 g/dL / Pro: 6.5 g/dL / ALK PHOS: 189 U/L / ALT: 49 U/L / AST: 108 U/L / GGT: x             Culture - Blood (collected 02-27-19 @ 11:27)  Source: .Blood None  Preliminary Report (02-28-19 @ 17:02):    No growth to date.    Culture - Blood (collected 02-27-19 @ 09:45)  Source: .Blood Blood  Preliminary Report (02-28-19 @ 17:01):    No growth to date.    Culture - Blood (collected 02-25-19 @ 12:54)  Source: .Blood Blood  Preliminary Report (02-26-19 @ 23:01):    No growth to date.            RADIOLOGY & ADDITIONAL TESTS:    ANTIBIOTICS:  cefepime   IVPB 1000 milliGRAM(s) IV Intermittent every 12 hours

## 2019-03-01 NOTE — PROGRESS NOTE ADULT - ASSESSMENT
BENI  hypotension  peripheral edema  cirrhosis  liver masses on sonogram  HFpEF / mod mr and mod as with 55% EF on 1/19 echo  anemia  bloody bm  new afib  PNA  recent left hip fx, s/p orif complicated by post-op BENI    plan:    off lasix  complete abx  AC per cardio  palliative care f/u  dnr / dni

## 2019-03-01 NOTE — PROGRESS NOTE ADULT - ASSESSMENT
· Assessment		  IMPRESSION:  CT with bibasilar opacities  PE with diffuse wheezes  GNR PNA  BCx NTD  WBC 14.1  Low grade aspiration with pneumonitis with leucocytosis        RECOMMENDATIONS  Nares for ORSA  Cefepime 1 gm iv q12h

## 2019-03-01 NOTE — PROGRESS NOTE ADULT - SUBJECTIVE AND OBJECTIVE BOX
Patient was seen and examined. Spoke with RN. Chart reviewed.  No events overnight.  Vital Signs Last 24 Hrs  T(F): 97 (01 Mar 2019 05:39), Max: 97.2 (28 Feb 2019 12:50)  HR: 72 (01 Mar 2019 05:39) (72 - 79)  BP: 92/59 (01 Mar 2019 05:39) (92/59 - 122/58)  SpO2: --  MEDICATIONS  (STANDING):  ascorbic acid 500 milliGRAM(s) Oral daily  aspirin enteric coated 325 milliGRAM(s) Oral daily  calcium carbonate 1250 mG  + Vitamin D (OsCal 500 + D) 1 Tablet(s) Oral three times a day  cefepime   IVPB 1000 milliGRAM(s) IV Intermittent every 12 hours  chlorhexidine 4% Liquid 1 Application(s) Topical <User Schedule>  diltiazem    Tablet 30 milliGRAM(s) Oral every 8 hours  docusate sodium 100 milliGRAM(s) Oral three times a day  ferrous    sulfate 325 milliGRAM(s) Oral daily  folic acid 1 milliGRAM(s) Oral daily  hydrocortisone hemorrhoidal Suppository 1 Suppository(s) Rectal at bedtime  latanoprost 0.005% Ophthalmic Solution 1 Drop(s) Both EYES at bedtime  metoprolol tartrate 25 milliGRAM(s) Oral every 8 hours  multivitamin 1 Tablet(s) Oral daily  polyethylene glycol 3350 17 Gram(s) Oral <User Schedule>    MEDICATIONS  (PRN):  morphine Concentrate 5 milliGRAM(s) Oral every 4 hours PRN pain or dyspnea  senna 2 Tablet(s) Oral at bedtime PRN Constipation    Labs:                        10.6   13.04 )-----------( 171      ( 01 Mar 2019 00:14 )             31.5                         11.7   12.64 )-----------( 180      ( 28 Feb 2019 17:05 )             34.0     28 Feb 2019 06:07    136    |  96     |  111    ----------------------------<  142    4.6     |  28     |  1.6    28 Feb 2019 02:08    136    |  94     |  110    ----------------------------<  157    4.5     |  29     |  1.5      Ca    9.2        28 Feb 2019 06:07  Ca    9.2        28 Feb 2019 02:08  Mg     1.9       28 Feb 2019 06:07    TPro  6.3    /  Alb  1.9    /  TBili  0.8    /  DBili  x      /  AST  95     /  ALT  45     /  AlkPhos  170    28 Feb 2019 06:07    PT/INR - ( 28 Feb 2019 17:05 )   PT: 14.40 sec;   INR: 1.25 ratio         PTT - ( 28 Feb 2019 17:05 )  PTT:30.5 sec      Culture - Blood (collected 27 Feb 2019 11:27)  Source: .Blood None  Preliminary Report (28 Feb 2019 17:02):    No growth to date.    Culture - Blood (collected 27 Feb 2019 09:45)  Source: .Blood Blood  Preliminary Report (28 Feb 2019 17:01):    No growth to date.      General: comfortable, NAD  Neurology: nonfocal  Head:  Normocephalic, atraumatic  ENT:  Mucosa moist, no ulcerations  Neck:  Supple, no JVD,   Skin: decubitus- as per RN  Resp: CTA B/L  CV: RRR, S1S2,   GI: Soft, NT, bowel sounds  MS: No edema, + peripheral pulses, FROM all 4 extremity      A/P:  89 year old lady with a PMHx of HTN presenting to the ED with difficulty breathing, found to be in new onset Afib in ED, also moderate AS    #) New Onset Atrial Fibrillation   -CHADSVASC 4  -Heparin drip stopped as Drop in Hb   - rate fluctuating between 100 and 120  -cardio : - Heparin 5000 BID - No long term anticoagulation until liver pathology worked up   - cardiology f/u daily    - foloow CBC closely    GI f/u    As per ID- tx with IV abx for possible GNR PNA      Findings consistent with liver cirrhosis and liver masses on US and CT scan - atient has no history of chronic liver disease and malignancy - at this time son doesn't want workup prefers to wait until cardiac issue resolved before making final decision - wants palliative on board   - Hepatitis panel noted    GI eval- please call if son wants to pursue    Palliative care eval    Burn f/u for decubitus      DVT prophylaxis  Decubitus prevention- all measures as per RN protocol  Please call or text me with any questions or updates

## 2019-03-01 NOTE — PROGRESS NOTE ADULT - ASSESSMENT
89yFemale being evaluated for goals of care and symptom management. Pt is comfortable today. Got one dose of Roxanol 5mg last evening. Pt seen by hospice today. Pt more stable rate control. No family at bedside.           Recommendations:  DNR/DNI  D/C to [facility] on hospice care  Roxanol 5mg SL Q 4 HRS PRN  Palliative following  [phone] for any questions 24/7

## 2019-03-01 NOTE — PROGRESS NOTE ADULT - ASSESSMENT
STARLA SPEAR   89y   Female    MRN#: [id number]         SUBJECTIVE  Patient is a 89y old Female who presents with a chief complaint of hypotension and SOB (01 Mar 2019 10:48)  Currently admitted to medicine with the primary diagnosis of Atrial fibrillation  Hospital course : Patient was admitted for hypotension - A VQ scan was performed and showed low probability for PE. A venous duplex of the lower extremities showed no evidence of DVT - Patient found to be in Afib not controlled on Cardizem - Transferred to [facility] for monitoring, Metoprolol added - 02/27 drop in Hb to 6.4, 3 units RBC given with improvement of Hb, CT scan non contrast performed and showed a collection (hematoma vs seroma) next to the left gamma nail -> ortho normal post-op finding not acute bleeding 2/2 heparin -Palliative care discussion between palliative car team and family on 02/28 02/28 : patient had brown movements mixed with blood - hemodynamically stable - STAT CBC showed no drop in Hb - patient was seen by GI MALCOLM performed and showed thrombosed hemorrhoids as well as rectal prolapse - patient started on bowel regimen to resolve disimpaction (stercoral colitis?) - patient has been sleeping more frequently    Today is hospital day 4d, and this morning she is _________ and reports ________ overnight events.     Present Today:           Short Catheter           Central Line          IV Fluids           Drains      OBJECTIVE  ------------------------------------    PAST MEDICAL & SURGICAL HISTORY  HTN (hypertension)  No significant past surgical history      ALLERGIES:  No Known Allergies      MEDICATIONS:  STANDING MEDICATIONS  ascorbic acid 500 milliGRAM(s) Oral daily  aspirin enteric coated 325 milliGRAM(s) Oral daily  calcium carbonate 1250 mG  + Vitamin D (OsCal 500 + D) 1 Tablet(s) Oral three times a day  cefepime   IVPB 1000 milliGRAM(s) IV Intermittent every 12 hours  chlorhexidine 4% Liquid 1 Application(s) Topical <User Schedule>  diltiazem    Tablet 30 milliGRAM(s) Oral every 8 hours  docusate sodium 100 milliGRAM(s) Oral three times a day  ferrous    sulfate 325 milliGRAM(s) Oral daily  folic acid 1 milliGRAM(s) Oral daily  hydrocortisone hemorrhoidal Suppository 1 Suppository(s) Rectal at bedtime  latanoprost 0.005% Ophthalmic Solution 1 Drop(s) Both EYES at bedtime  metoprolol tartrate 25 milliGRAM(s) Oral every 8 hours  multivitamin 1 Tablet(s) Oral daily  polyethylene glycol 3350 17 Gram(s) Oral <User Schedule>    PRN MEDICATIONS  morphine Concentrate 5 milliGRAM(s) Oral every 4 hours PRN  senna 2 Tablet(s) Oral at bedtime PRN        LABS:                        11.4   14.19 )-----------( 168      ( 01 Mar 2019 07:04 )             34.6     03-01    137  |  97<L>  |  106<HH>  ----------------------------<  122<H>  4.3   |  30  |  1.5    Ca    9.6      01 Mar 2019 07:04  Mg     2.0     03-01    TPro  6.5  /  Alb  1.9<L>  /  TBili  0.8  /  DBili  x   /  AST  108<H>  /  ALT  49<H>  /  AlkPhos  189<H>  03-01    PT/INR - ( 01 Mar 2019 07:04 )   PT: 13.20 sec;   INR: 1.15 ratio         PTT - ( 01 Mar 2019 07:04 )  PTT:31.2 sec          Culture - Other (collected 27 Feb 2019 16:08)  Source: .Other wound  Preliminary Report (01 Mar 2019 11:46):    Moderate Enterococcus species    Moderate Coag Negative Staphylococcus "Susceptibilities not performed"    Few Alpha hemolytic strep "Susceptibilities not performed"    Numerous Corynebacterium species "Susceptibilities not performed"    Culture - Blood (collected 27 Feb 2019 11:27)  Source: .Blood None  Preliminary Report (28 Feb 2019 17:02):    No growth to date.    Culture - Blood (collected 27 Feb 2019 09:45)  Source: .Blood Blood  Preliminary Report (28 Feb 2019 17:01):    No growth to date.            RADIOLOGY:      PHYSICAL EXAM:  VITAL SIGNS: Last 24 Hours  T(C): 36.1 (01 Mar 2019 05:39), Max: 36.1 (01 Mar 2019 05:39)  T(F): 97 (01 Mar 2019 05:39), Max: 97 (01 Mar 2019 05:39)  HR: 57 (01 Mar 2019 13:14) (57 - 79)  BP: 95/52 (01 Mar 2019 13:14) (92/59 - 122/58)  BP(mean): --  RR: 18 (01 Mar 2019 13:14) (18 - 20)  SpO2: --    GENERAL: NAD, well-developed, AAOx3  HEENT:  Atraumatic, Normocephalic. EOMI, PERRLA, conjunctiva and sclera clear, No JVD  PULMONARY: Clear to auscultation bilaterally; No wheeze  CARDIOVASCULAR: Regular rate and rhythm; No murmurs, rubs, or gallops  GASTROINTESTINAL: Soft, Nontender, Nondistended; Bowel sounds present  MUSCULOSKELETAL:  2+ Peripheral Pulses, No clubbing, cyanosis, or edema  NEUROLOGY: non-focal  SKIN: No rashes or lesions      ASSESSMENT & PLAN      # DVT prophylaxis :  # GI prophylaxis :  # Activity :  # Diet :  # Code :  # Disposition : STARLA SPEAR   89y   Female    MRN#: [id number]         SUBJECTIVE  Patient is a 89y old Female who presents with a chief complaint of hypotension and SOB (01 Mar 2019 10:48)  Currently admitted to medicine with the primary diagnosis of Atrial fibrillation  Hospital course : Patient was admitted for hypotension - A VQ scan was performed and showed low probability for PE. A venous duplex of the lower extremities showed no evidence of DVT - Patient found to be in Afib not controlled on Cardizem - Transferred to [facility] for monitoring, Metoprolol added - 02/27 drop in Hb to 6.4, 3 units RBC given with improvement of Hb, CT scan non contrast performed and showed a collection (hematoma vs seroma) next to the left gamma nail -> ortho normal post-op finding not acute bleeding 2/2 heparin -Palliative care discussion between palliative car team and family on 02/28 02/28 : patient had brown movements mixed with blood - hemodynamically stable - STAT CBC showed no drop in Hb - patient was seen by GI MALCOLM performed and showed thrombosed hemorrhoids as well as rectal prolapse - patient started on bowel regimen to resolve disimpaction (stercoral colitis?) - patient has been sleeping more frequently, decreased PO intake and decreased urine output   03/01 : sinus rhythm noted on Telemetry   Today is hospital day 4d, and this morning she sleeping in bed, can be aroused when being spoken to, no distress noted - no events overnight     Present Today:           Short Catheter X          Central Line X          IV Fluids X          Drains X      OBJECTIVE  ------------------------------------    PAST MEDICAL & SURGICAL HISTORY  HTN (hypertension)  No significant past surgical history      ALLERGIES:  No Known Allergies      MEDICATIONS:  STANDING MEDICATIONS  ascorbic acid 500 milliGRAM(s) Oral daily  aspirin enteric coated 325 milliGRAM(s) Oral daily  calcium carbonate 1250 mG  + Vitamin D (OsCal 500 + D) 1 Tablet(s) Oral three times a day  cefepime   IVPB 1000 milliGRAM(s) IV Intermittent every 12 hours  chlorhexidine 4% Liquid 1 Application(s) Topical <User Schedule>  diltiazem    Tablet 30 milliGRAM(s) Oral every 8 hours  docusate sodium 100 milliGRAM(s) Oral three times a day  ferrous    sulfate 325 milliGRAM(s) Oral daily  folic acid 1 milliGRAM(s) Oral daily  hydrocortisone hemorrhoidal Suppository 1 Suppository(s) Rectal at bedtime  latanoprost 0.005% Ophthalmic Solution 1 Drop(s) Both EYES at bedtime  metoprolol tartrate 25 milliGRAM(s) Oral every 8 hours  multivitamin 1 Tablet(s) Oral daily  polyethylene glycol 3350 17 Gram(s) Oral <User Schedule>    PRN MEDICATIONS  morphine Concentrate 5 milliGRAM(s) Oral every 4 hours PRN  senna 2 Tablet(s) Oral at bedtime PRN        LABS:                        11.4   14.19 )-----------( 168      ( 01 Mar 2019 07:04 )             34.6     03-01    137  |  97<L>  |  106<HH>  ----------------------------<  122<H>  4.3   |  30  |  1.5    Ca    9.6      01 Mar 2019 07:04  Mg     2.0     03-01    TPro  6.5  /  Alb  1.9<L>  /  TBili  0.8  /  DBili  x   /  AST  108<H>  /  ALT  49<H>  /  AlkPhos  189<H>  03-01    PT/INR - ( 01 Mar 2019 07:04 )   PT: 13.20 sec;   INR: 1.15 ratio         PTT - ( 01 Mar 2019 07:04 )  PTT:31.2 sec          Culture - Other (collected 27 Feb 2019 16:08)  Source: .Other wound  Preliminary Report (01 Mar 2019 11:46):    Moderate Enterococcus species    Moderate Coag Negative Staphylococcus "Susceptibilities not performed"    Few Alpha hemolytic strep "Susceptibilities not performed"    Numerous Corynebacterium species "Susceptibilities not performed"    Culture - Blood (collected 27 Feb 2019 11:27)  Source: .Blood None  Preliminary Report (28 Feb 2019 17:02):    No growth to date.    Culture - Blood (collected 27 Feb 2019 09:45)  Source: .Blood Blood  Preliminary Report (28 Feb 2019 17:01):    No growth to date.            RADIOLOGY:    No imaging studies performed today    PHYSICAL EXAM:  VITAL SIGNS: Last 24 Hours  T(C): 36.1 (01 Mar 2019 05:39), Max: 36.1 (01 Mar 2019 05:39)  T(F): 97 (01 Mar 2019 05:39), Max: 97 (01 Mar 2019 05:39)  HR: 57 (01 Mar 2019 13:14) (57 - 79)  BP: 95/52 (01 Mar 2019 13:14) (92/59 - 122/58)  BP(mean): --  RR: 18 (01 Mar 2019 13:14) (18 - 20)  SpO2: --    GENERAL: No distress     PULMONARY: Clear lung fields bilaterally - no respiratory distress   CARDIOVASCULAR: Regular S1S2 - systolic ejection murmur    GASTROINTESTINAL: Soft, Nontender, Nondistended  MUSCULOSKELETAL:  + Peripheral Pulses, Lower extremity edema       ASSESSMENT & PLAN    89 year old lady with a PMHx of HTN presenting to the ED with difficulty breathing, found to be in new onset Afib in ED    #) New Onset Atrial Fibrillation   -CHADSVASC 4  -Heparin drip stopped as Drop in Hb to 6.4 02/2711 s/p 3 units stable since   -Metoprolol 25 q8 + Cardizem 30 q8  -cardio : Metoprolol as above - Heparin 5000 BID - No long term anticoagulation until liver pathology worked up     #) Hypotension, Anemia Hb 6.4 : Active bleeding ? vs infectious etiology  - Bowel movements normal - no signs of active bleed  - Transfused 3 units so far - Hb stable since transfusions   - CT scan abdomen and pelvis no contrast showed collection next to left gamma nail hematoma vs seroma -> spoke with orthopedics regarding results - per ortho normal finding post-op / chronic / not concerning / not exacerbated by heparin and wouldn't explain drop in Hb   - ID : Nares for ORSA - Cefepime 1 g IV q12 as bibasilar opacities on CT      #) Lower Extremity edema   -BNP 6349  -trop 0.04 -> 0.03 -> 0.03  -TTE (1/2019): EF 60%, moderate AS - Repeat TTE pending read     #) BENI on CKD  -likely prerenal due to hypotension  -baseline Cr 1.0 1/2019 - creatinine 1.6 vs 1.5 yesterday  -Nephro : Off Lasix - Antibiotics per ID - palliative care evaluation    #) Findings consistent with liver cirrhosis and liver masses on US and CT scan - discussed results with son Inocencio, per son patient has no history of chronic liver disease and malignancy - at this time doesn't want workup   - Elevated ALP and AST - INR 1.33 - Bilirubin normal - Albumin low 1.8   - Hepatitis panel : Reactive for hepatitis C     #) Folic Acid Deficiency   Continue with Folic acid.     #) Goals of care : discussion between palliative care team and family today - Family would like cardiac status to be optimized - no workup for liver masses - Family agrees that priority is for patient to be comfortable but would like to continue with current treatment and blood draws / transfusions as needed - DNR and DNI - Disposition : Home vs hospice facility     # DVT prophylaxis : No AC for now   # GI prophylaxis : X  # Activity : Increase as tolerated   # Diet : Nectar thick   # Code : DNR / DNI - MOLST form signed and in chart  # Disposition : Home with hospice vs hospice facility pending cardiac optimization STARLA SPEAR   89y   Female    MRN#: [id number]         SUBJECTIVE  Patient is a 89y old Female who presents with a chief complaint of hypotension and SOB (01 Mar 2019 10:48)  Currently admitted to medicine with the primary diagnosis of Atrial fibrillation  Hospital course : Patient was admitted for hypotension - A VQ scan was performed and showed low probability for PE. A venous duplex of the lower extremities showed no evidence of DVT - Patient found to be in Afib not controlled on Cardizem - Transferred to [facility] for monitoring, Metoprolol added - 02/27 drop in Hb to 6.4, 3 units RBC given with improvement of Hb, CT scan non contrast performed and showed a collection (hematoma vs seroma) next to the left gamma nail -> ortho normal post-op finding not acute bleeding 2/2 heparin -Palliative care discussion between palliative car team and family on 02/28 02/28 : patient had brown movements mixed with blood - hemodynamically stable - STAT CBC showed no drop in Hb - patient was seen by GI MALCOLM performed and showed thrombosed hemorrhoids as well as rectal prolapse - patient started on bowel regimen to resolve disimpaction (stercoral colitis?) - patient has been sleeping more frequently, decreased PO intake and decreased urine output   03/01 : sinus rhythm noted on Telemetry   Today is hospital day 4d, and this morning she sleeping in bed, can be aroused when being spoken to, no distress noted - no events overnight     Present Today:           Short Catheter X          Central Line X          IV Fluids X          Drains X      OBJECTIVE  ------------------------------------    PAST MEDICAL & SURGICAL HISTORY  HTN (hypertension)  No significant past surgical history      ALLERGIES:  No Known Allergies      MEDICATIONS:  STANDING MEDICATIONS  ascorbic acid 500 milliGRAM(s) Oral daily  aspirin enteric coated 325 milliGRAM(s) Oral daily  calcium carbonate 1250 mG  + Vitamin D (OsCal 500 + D) 1 Tablet(s) Oral three times a day  cefepime   IVPB 1000 milliGRAM(s) IV Intermittent every 12 hours  chlorhexidine 4% Liquid 1 Application(s) Topical <User Schedule>  diltiazem    Tablet 30 milliGRAM(s) Oral every 8 hours  docusate sodium 100 milliGRAM(s) Oral three times a day  ferrous    sulfate 325 milliGRAM(s) Oral daily  folic acid 1 milliGRAM(s) Oral daily  hydrocortisone hemorrhoidal Suppository 1 Suppository(s) Rectal at bedtime  latanoprost 0.005% Ophthalmic Solution 1 Drop(s) Both EYES at bedtime  metoprolol tartrate 25 milliGRAM(s) Oral every 8 hours  multivitamin 1 Tablet(s) Oral daily  polyethylene glycol 3350 17 Gram(s) Oral <User Schedule>    PRN MEDICATIONS  morphine Concentrate 5 milliGRAM(s) Oral every 4 hours PRN  senna 2 Tablet(s) Oral at bedtime PRN        LABS:                        11.4   14.19 )-----------( 168      ( 01 Mar 2019 07:04 )             34.6     03-01    137  |  97<L>  |  106<HH>  ----------------------------<  122<H>  4.3   |  30  |  1.5    Ca    9.6      01 Mar 2019 07:04  Mg     2.0     03-01    TPro  6.5  /  Alb  1.9<L>  /  TBili  0.8  /  DBili  x   /  AST  108<H>  /  ALT  49<H>  /  AlkPhos  189<H>  03-01    PT/INR - ( 01 Mar 2019 07:04 )   PT: 13.20 sec;   INR: 1.15 ratio         PTT - ( 01 Mar 2019 07:04 )  PTT:31.2 sec          Culture - Other (collected 27 Feb 2019 16:08)  Source: .Other wound  Preliminary Report (01 Mar 2019 11:46):    Moderate Enterococcus species    Moderate Coag Negative Staphylococcus "Susceptibilities not performed"    Few Alpha hemolytic strep "Susceptibilities not performed"    Numerous Corynebacterium species "Susceptibilities not performed"    Culture - Blood (collected 27 Feb 2019 11:27)  Source: .Blood None  Preliminary Report (28 Feb 2019 17:02):    No growth to date.    Culture - Blood (collected 27 Feb 2019 09:45)  Source: .Blood Blood  Preliminary Report (28 Feb 2019 17:01):    No growth to date.            RADIOLOGY:    No imaging studies performed today    PHYSICAL EXAM:  VITAL SIGNS: Last 24 Hours  T(C): 36.1 (01 Mar 2019 05:39), Max: 36.1 (01 Mar 2019 05:39)  T(F): 97 (01 Mar 2019 05:39), Max: 97 (01 Mar 2019 05:39)  HR: 57 (01 Mar 2019 13:14) (57 - 79)  BP: 95/52 (01 Mar 2019 13:14) (92/59 - 122/58)  BP(mean): --  RR: 18 (01 Mar 2019 13:14) (18 - 20)  SpO2: --    GENERAL: No distress     PULMONARY: Clear lung fields bilaterally - no respiratory distress   CARDIOVASCULAR: Regular S1S2 - systolic ejection murmur    GASTROINTESTINAL: Soft, Nontender, Nondistended  MUSCULOSKELETAL:  + Peripheral Pulses, Lower extremity edema       ASSESSMENT & PLAN    89 year old lady with a PMHx of HTN presenting to the ED with difficulty breathing, found to be in new onset Afib in ED    #) New Onset Atrial Fibrillation - reverted to NSR   -CHADSVASC 4  -Heparin drip stopped as Drop in Hb to 6.4 02/27 - s/p 3 units stable since   -Metoprolol 25 q8 + Cardizem 30 q8  -cardio : Metoprolol as above - Heparin 5000 BID - No long term anticoagulation until liver pathology worked up     #) Hypotension, Anemia Hb 6.4, BRBPR 02/28  - Transfused 3 units so far - Hb stable - Active type and screen - hemodynamically stable   - CT scan abdomen and pelvis no contrast showed collection next to left gamma nail hematoma vs seroma -> spoke with orthopedics regarding results - per ortho normal finding post-op / chronic / not concerning / not exacerbated by heparin and wouldn't explain drop in Hb   - ID : Cefepime 1 g IV q12 as bibasilar opacities on CT  - BRBPR : stercoral colitis vs thrombosed hemorrhoids -> bowel regimen with tap water enema q6 + Miralax twice daily + senna 2 tabs at bedtime - GI following    #) Stage IV sacral ulcer   - S/P bedside debridement by Dr. Meléndez 02/27  - wound care as per burn     #) Lower Extremity edema   -BNP 6349  -trop 0.04 -> 0.03 -> 0.03  -TTE (1/2019): EF 60%, moderate AS - Repeat TTE EF 60 % with Grade I diastolic dysfunction, moderate to severe AS     #) BENI on CKD - stable   -likely prerenal due to hypotension  -baseline Cr 1.0 1/2019 - creatinine ~ 1.5  -Nephro : Off Lasix - Antibiotics per ID - palliative care evaluation    #) Findings consistent with liver cirrhosis and liver masses on US and CT scan - discussed results with son Inocencio, per son patient has no history of chronic liver disease and malignancy - at this time doesn't want workup   - Elevated ALP and AST - INR 1.33 - Bilirubin normal - Albumin low 1.8   - Hepatitis panel : Reactive for hepatitis C     #) Folic Acid Deficiency   Continue with Folic acid.     #) Goals of care : discussion between palliative care team and family today - Family would like cardiac status to be optimized - no workup for liver masses - Family agrees that priority is for patient to be comfortable but would like to continue with current treatment and blood draws / transfusions as needed while patient is in hospital - DNR and DNI     # DVT prophylaxis : No AC for now   # GI prophylaxis : X  # Activity : Increase as tolerated   # Diet : Nectar thick   # Code : DNR / DNI - MOLST form signed and in chart  # Disposition : Family wants Hospice at [facility]

## 2019-03-01 NOTE — PROGRESS NOTE ADULT - SUBJECTIVE AND OBJECTIVE BOX
89yFemale with diagnosis: ATRIAL FIBRILLATION      Patient seen for follow up      PHYSICAL EXAM  pt sleeping, comfortable  no SOB, no grimacing    T(C): , Max: 36.1 (05:39)  T(F): 97  HR: 57 (57 - 79)  BP: 95/52 (92/59 - 122/58)  RR: 18 (18 - 20)  SpO2: --              LABS:                          11.4   14.19 )-----------( 168      ( 01 Mar 2019 07:04 )             34.6                                                                                      03-01    137  |  97<L>  |  106<HH>  ----------------------------<  122<H>  4.3   |  30  |  1.5    Ca    9.6      01 Mar 2019 07:04  Mg     2.0     03-01    TPro  6.5  /  Alb  1.9<L>  /  TBili  0.8  /  DBili  x   /  AST  108<H>  /  ALT  49<H>  /  AlkPhos  189<H>  03-01                                                      MEDICATIONS  (STANDING):  ascorbic acid 500 milliGRAM(s) Oral daily  aspirin enteric coated 325 milliGRAM(s) Oral daily  calcium carbonate 1250 mG  + Vitamin D (OsCal 500 + D) 1 Tablet(s) Oral three times a day  cefepime   IVPB 1000 milliGRAM(s) IV Intermittent every 12 hours  chlorhexidine 4% Liquid 1 Application(s) Topical <User Schedule>  diltiazem    Tablet 30 milliGRAM(s) Oral every 8 hours  docusate sodium 100 milliGRAM(s) Oral three times a day  ferrous    sulfate 325 milliGRAM(s) Oral daily  folic acid 1 milliGRAM(s) Oral daily  hydrocortisone hemorrhoidal Suppository 1 Suppository(s) Rectal at bedtime  latanoprost 0.005% Ophthalmic Solution 1 Drop(s) Both EYES at bedtime  metoprolol tartrate 25 milliGRAM(s) Oral every 8 hours  multivitamin 1 Tablet(s) Oral daily  polyethylene glycol 3350 17 Gram(s) Oral <User Schedule>    MEDICATIONS  (PRN):  morphine Concentrate 5 milliGRAM(s) Oral every 4 hours PRN pain or dyspnea  senna 2 Tablet(s) Oral at bedtime PRN Constipation

## 2019-03-02 NOTE — PROGRESS NOTE ADULT - SUBJECTIVE AND OBJECTIVE BOX
NEPHROLOGY FOLLOW UP NOTE    pt seen and examined  d/w nursing  performing enemas  + some blood from rectum  poorly responsive today with monotonous head movements  wayne placed   on ivf      PAST MEDICAL & SURGICAL HISTORY:  HTN (hypertension)  No significant past surgical history    Allergies:  No Known Allergies    Home Medications Reviewed    SOCIAL HISTORY:  Denies ETOH,Smoking,   FAMILY HISTORY:  No pertinent family history in first degree relatives        REVIEW OF SYSTEMS:  poor historian  All other review of systems is negative unless indicated above.      PHYSICAL EXAM:  NAD  responsive  monotonous head movements  frail  [place]  dry mm  b/l rhonchi  rrr  soft, nt  2+ edema  + wayne      Hospital Medications:   MEDICATIONS  (STANDING):  ascorbic acid 500 milliGRAM(s) Oral daily  aspirin enteric coated 325 milliGRAM(s) Oral daily  calcium carbonate 1250 mG  + Vitamin D (OsCal 500 + D) 1 Tablet(s) Oral three times a day  cefepime   IVPB 1000 milliGRAM(s) IV Intermittent every 12 hours  chlorhexidine 4% Liquid 1 Application(s) Topical <User Schedule>  collagenase Ointment 1 Application(s) Topical two times a day  Dakins Solution - Full Strength 1 Application(s) Topical two times a day  dextrose 5% + sodium chloride 0.9%. 1000 milliLiter(s) (30 mL/Hr) IV Continuous <Continuous>  diltiazem    Tablet 30 milliGRAM(s) Oral every 8 hours  docusate sodium 100 milliGRAM(s) Oral three times a day  ferrous    sulfate 325 milliGRAM(s) Oral daily  folic acid 1 milliGRAM(s) Oral daily  hydrocortisone hemorrhoidal Suppository 1 Suppository(s) Rectal at bedtime  latanoprost 0.005% Ophthalmic Solution 1 Drop(s) Both EYES at bedtime  metoprolol tartrate 25 milliGRAM(s) Oral every 8 hours  multivitamin 1 Tablet(s) Oral daily  polyethylene glycol 3350 17 Gram(s) Oral <User Schedule>        VITALS:  T(F): 96 (03-02-19 @ 05:36), Max: 96 (03-01-19 @ 20:05)  HR: 94 (03-02-19 @ 05:36)  BP: 103/64 (03-02-19 @ 05:36)  RR: 18 (03-02-19 @ 05:36)  SpO2: --  Wt(kg): --    02-28 @ 07:01 - 03-01 @ 07:00  --------------------------------------------------------  IN: 360 mL / OUT: 0 mL / NET: 360 mL    03-01 @ 07:01  -  03-02 @ 07:00  --------------------------------------------------------  IN: 0 mL / OUT: 1100 mL / NET: -1100 mL          LABS:  03-01    137  |  97<L>  |  106<HH>  ----------------------------<  122<H>  4.3   |  30  |  1.5    Ca    9.6      01 Mar 2019 07:04  Mg     2.0     03-01    TPro  6.5  /  Alb  1.9<L>  /  TBili  0.8  /  DBili      /  AST  108<H>  /  ALT  49<H>  /  AlkPhos  189<H>  03-01                          11.4   14.19 )-----------( 168      ( 01 Mar 2019 07:04 )             34.6       Urine Studies:        RADIOLOGY & ADDITIONAL STUDIES:

## 2019-03-02 NOTE — PROGRESS NOTE ADULT - ASSESSMENT
BENI  peripheral edema  cirrhosis  liver masses on sonogram  HFpEF / mod mr and mod as with 55% EF on 1/19 echo  anemia  bloody bm  new afib  PNA  recent left hip fx, s/p orif complicated by post-op BENI  urine retention  altered mental status    plan:    dc cefepime - could be causing neurotoxicity  off lasix  now on gentle ivf  wayne in place  palliative care f/u  dnr / dni  possible hospice

## 2019-03-02 NOTE — PROGRESS NOTE ADULT - SUBJECTIVE AND OBJECTIVE BOX
Patient is a 89y old  Female who presents with a chief complaint of hypotension and SOB (02 Mar 2019 10:15)      HPI:  89 year old lady with a PMHx of HTN presenting to the ED with difficulty breathing. She was discharged to [facility] 1/2019 after a L hip fracture. As per the records from the nursing home she has become increasingly short of breath and her legs got more and more swollen.  Vitals at the nursing home showed that the patient was hypotensive in the 80s so they sent her to ED fo further evaluation. Denied HA, dizziness, cp, palpitations, back pain, rash, numbness, tingling, weakness. She does note that her legs are swollen but doesn't endorse any SOB herself.    In ED, bp 97/86  HR 74  RR 20 T 96.5F 100% on RA.  She was found to be hypokalemic 2.5 and was found to be in new onset atrial fibrillation and was started on heparin gtt. Lactate 3.8, trop 0.04, BNP 6349. PE study was not done due to an BENI. (25 Feb 2019 16:32)      INTERVAL HPI/OVERNIGHT EVENTS:  T(C): 35.6 (03-02-19 @ 05:36), Max: 35.6 (03-01-19 @ 20:05)  HR: 94 (03-02-19 @ 05:36) (57 - 94)  BP: 103/64 (03-02-19 @ 05:36) (95/52 - 103/64)  RR: 18 (03-02-19 @ 05:36) (18 - 18)  SpO2: --  Wt(kg): --  I&O's Summary    01 Mar 2019 07:01  -  02 Mar 2019 07:00  --------------------------------------------------------  IN: 0 mL / OUT: 1100 mL / NET: -1100 mL        REVIEW OF SYSTEMS:  CONSTITUTIONAL: No fever, weight loss, or fatigue  EYES: No eye pain, visual disturbances, or discharge  ENMT:  No difficulty hearing, tinnitus, vertigo; No sinus or throat pain  NECK: No pain or stiffness  BREASTS: No pain, masses, or nipple discharge  RESPIRATORY: No cough, wheezing, chills or hemoptysis; No shortness of breath  CARDIOVASCULAR: No chest pain, palpitations, dizziness, or leg swelling  GASTROINTESTINAL: No abdominal or epigastric pain. No nausea, vomiting, or hematemesis; No diarrhea or constipation. No melena or hematochezia.  GENITOURINARY: No dysuria, frequency, hematuria, or incontinence  NEUROLOGICAL: No headaches, memory loss, loss of strength, numbness, or tremors  SKIN: No itching, burning, rashes, or lesions   LYMPH NODES: No enlarged glands  ENDOCRINE: No heat or cold intolerance; No hair loss  MUSCULOSKELETAL: No joint pain or swelling; No muscle, back, or extremity pain  PSYCHIATRIC: No depression, anxiety, mood swings, or difficulty sleeping  HEME/LYMPH: No easy bruising, or bleeding gums  ALLERY AND IMMUNOLOGIC: No hives or eczema    PHYSICAL EXAM:  GENERAL: NAD, well-groomed, well-developed  HEAD:  Atraumatic, Normocephalic  EYES: EOMI, PERRLA, conjunctiva and sclera clear  ENMT: No tonsillar erythema, exudates, or enlargement; Moist mucous membranes, Good dentition, No lesions  NECK: Supple, No JVD, Normal thyroid  NERVOUS SYSTEM:  Alert & Oriented X3, Good concentration; Motor Strength 5/5 B/L upper and lower extremities; DTRs 2+ intact and symmetric  CHEST/LUNG: Clear to percussion bilaterally; No rales, rhonchi, wheezing, or rubs  HEART: Regular rate and rhythm; No murmurs, rubs, or gallops  ABDOMEN: Soft, Nontender, Nondistended; Bowel sounds present  EXTREMITIES:  2+ Peripheral Pulses, No clubbing, cyanosis, or edema  LYMPH: No lymphadenopathy noted  SKIN: No rashes or lesions    MEDICATIONS  (STANDING):  ascorbic acid 500 milliGRAM(s) Oral daily  aspirin enteric coated 325 milliGRAM(s) Oral daily  calcium carbonate 1250 mG  + Vitamin D (OsCal 500 + D) 1 Tablet(s) Oral three times a day  chlorhexidine 4% Liquid 1 Application(s) Topical <User Schedule>  collagenase Ointment 1 Application(s) Topical two times a day  Dakins Solution - Full Strength 1 Application(s) Topical two times a day  dextrose 5% + sodium chloride 0.9%. 1000 milliLiter(s) (30 mL/Hr) IV Continuous <Continuous>  diltiazem    Tablet 30 milliGRAM(s) Oral every 8 hours  docusate sodium 100 milliGRAM(s) Oral three times a day  ferrous    sulfate 325 milliGRAM(s) Oral daily  folic acid 1 milliGRAM(s) Oral daily  hydrocortisone hemorrhoidal Suppository 1 Suppository(s) Rectal at bedtime  latanoprost 0.005% Ophthalmic Solution 1 Drop(s) Both EYES at bedtime  metoprolol tartrate 25 milliGRAM(s) Oral every 8 hours  multivitamin 1 Tablet(s) Oral daily  polyethylene glycol 3350 17 Gram(s) Oral <User Schedule>    MEDICATIONS  (PRN):  morphine Concentrate 5 milliGRAM(s) Oral every 4 hours PRN pain or dyspnea  senna 2 Tablet(s) Oral at bedtime PRN Constipation      LABS:                        11.4   14.19 )-----------( 168      ( 01 Mar 2019 07:04 )             34.6     03-01    137  |  97<L>  |  106<HH>  ----------------------------<  122<H>  4.3   |  30  |  1.5    Ca    9.6      01 Mar 2019 07:04  Mg     2.0     03-01    TPro  6.5  /  Alb  1.9<L>  /  TBili  0.8  /  DBili  x   /  AST  108<H>  /  ALT  49<H>  /  AlkPhos  189<H>  03-01    PT/INR - ( 01 Mar 2019 07:04 )   PT: 13.20 sec;   INR: 1.15 ratio         PTT - ( 01 Mar 2019 07:04 )  PTT:31.2 sec    CAPILLARY BLOOD GLUCOSE      POCT Blood Glucose.: 118 mg/dL (01 Mar 2019 20:54)      02-27 @ 16:08   Moderate Enterococcus species  Moderate Coag Negative Staphylococcus "Susceptibilities not performed"  Few Alpha hemolytic strep "Susceptibilities not performed"  Numerous Corynebacterium species "Susceptibilities not performed"  --  --  02-27 @ 11:27   No growth to date.  --  --  02-27 @ 09:45   No growth to date.  --  --          RADIOLOGY & ADDITIONAL TESTS:    Imaging Personally Reviewed:  [ ] YES  [ ] NO    Consultant(s) Notes Reviewed:  [ ] YES  [ ] NO    Palliative Care:    Assessment & Plan:   HEALTH ISSUES - PROBLEM Dx:          DVT ppx     Care Discussed with Consultants/Other Providers [ ] YES  [ ] NO

## 2019-03-02 NOTE — PROGRESS NOTE ADULT - ASSESSMENT
BENI  peripheral edema  cirrhosis  liver masses on sonogram  HFpEF / mod mr and mod as with 55% EF on 1/19 echo  anemia  bloody bm  new afib  PNA  recent left hip fx, s/p orif complicated by post-op BENI  urine retention  altered mental status    plan:  --------------------------------------------------------------    dc cefepime - could be causing neurotoxicity  off lasix  now on gentle ivf  wayne in place  palliative care f/u  dnr / dni  possible hospice

## 2019-03-03 NOTE — PROGRESS NOTE ADULT - ASSESSMENT
BENI - stable  peripheral edema - better  cirrhosis  liver masses on sonogram  HFpEF / mod mr and mod as with 55% EF on 1/19 echo  anemia  bloody bm  new afib  PNA  recent left hip fx, s/p orif complicated by post-op BENI  urine retention  altered mental status - cefepime neurotoxicity - now better off abx    plan:    off abx  off lasix  cont gentle ivf  cont wayne  palliative care f/u  dnr / dni  possible hospice  poor overall prognosis

## 2019-03-03 NOTE — PROGRESS NOTE ADULT - SUBJECTIVE AND OBJECTIVE BOX
NEPHROLOGY FOLLOW UP NOTE    pt seen and examined  d/w nursing  wayne in place  still on gentle ivf  no new events      PAST MEDICAL & SURGICAL HISTORY:  HTN (hypertension)  No significant past surgical history    Allergies:  No Known Allergies    Home Medications Reviewed    SOCIAL HISTORY:  Denies ETOH,Smoking,   FAMILY HISTORY:  No pertinent family history in first degree relatives        REVIEW OF SYSTEMS:  poor historian  All other review of systems is negative unless indicated above.      PHYSICAL EXAM:  NAD  responsive  monotonous head movements  frail  [place]  dry mm  b/l rhonchi  rrr  soft, nt  2+ edema  + [facility] Medications:   MEDICATIONS  (STANDING):  ascorbic acid 500 milliGRAM(s) Oral daily  aspirin enteric coated 325 milliGRAM(s) Oral daily  calcium carbonate 1250 mG  + Vitamin D (OsCal 500 + D) 1 Tablet(s) Oral three times a day  chlorhexidine 4% Liquid 1 Application(s) Topical <User Schedule>  collagenase Ointment 1 Application(s) Topical two times a day  Dakins Solution - Full Strength 1 Application(s) Topical two times a day  dextrose 5% + sodium chloride 0.9%. 1000 milliLiter(s) (30 mL/Hr) IV Continuous <Continuous>  diltiazem    Tablet 30 milliGRAM(s) Oral every 8 hours  docusate sodium 100 milliGRAM(s) Oral three times a day  ferrous    sulfate 325 milliGRAM(s) Oral daily  folic acid 1 milliGRAM(s) Oral daily  hydrocortisone hemorrhoidal Suppository 1 Suppository(s) Rectal at bedtime  latanoprost 0.005% Ophthalmic Solution 1 Drop(s) Both EYES at bedtime  metoprolol tartrate 25 milliGRAM(s) Oral every 8 hours  multivitamin 1 Tablet(s) Oral daily  polyethylene glycol 3350 17 Gram(s) Oral <User Schedule>        VITALS:  T(F): 97 (03-03-19 @ 06:27), Max: 97 (03-03-19 @ 06:27)  HR: 115 (03-03-19 @ 06:27)  BP: 120/72 (03-03-19 @ 06:27)  RR: 18 (03-03-19 @ 06:27)  SpO2: --  Wt(kg): --    03-01 @ 07:01  -  03-02 @ 07:00  --------------------------------------------------------  IN: 0 mL / OUT: 1100 mL / NET: -1100 mL    03-02 @ 07:01  -  03-03 @ 07:00  --------------------------------------------------------  IN: 360 mL / OUT: 700 mL / NET: -340 mL          LABS:  03-03    145  |  106  |  90<HH>  ----------------------------<  77  3.8   |  30  |  1.5    Ca    9.0      03 Mar 2019 05:56  Mg     2.0     03-03    TPro  6.0  /  Alb  1.7<L>  /  TBili  0.6  /  DBili      /  AST  107<H>  /  ALT  44<H>  /  AlkPhos  145<H>  03-02                          10.6   20.10 )-----------( 152      ( 03 Mar 2019 05:56 )             33.0       Urine Studies:        RADIOLOGY & ADDITIONAL STUDIES:

## 2019-03-03 NOTE — PROGRESS NOTE ADULT - SUBJECTIVE AND OBJECTIVE BOX
Patient was seen and examined. Spoke with RN. Chart reviewed.  No events overnight.  Vital Signs Last 24 Hrs  T(F): 97 (03 Mar 2019 06:27), Max: 97 (03 Mar 2019 06:27)  HR: 115 (03 Mar 2019 06:27) (84 - 115)  BP: 120/72 (03 Mar 2019 06:27) (101/53 - 120/72)  SpO2: --  MEDICATIONS  (STANDING):  ascorbic acid 500 milliGRAM(s) Oral daily  aspirin enteric coated 325 milliGRAM(s) Oral daily  calcium carbonate 1250 mG  + Vitamin D (OsCal 500 + D) 1 Tablet(s) Oral three times a day  chlorhexidine 4% Liquid 1 Application(s) Topical <User Schedule>  collagenase Ointment 1 Application(s) Topical two times a day  Dakins Solution - Full Strength 1 Application(s) Topical two times a day  dextrose 5% + sodium chloride 0.9%. 1000 milliLiter(s) (30 mL/Hr) IV Continuous <Continuous>  diltiazem    Tablet 30 milliGRAM(s) Oral every 8 hours  docusate sodium 100 milliGRAM(s) Oral three times a day  ferrous    sulfate 325 milliGRAM(s) Oral daily  folic acid 1 milliGRAM(s) Oral daily  hydrocortisone hemorrhoidal Suppository 1 Suppository(s) Rectal at bedtime  latanoprost 0.005% Ophthalmic Solution 1 Drop(s) Both EYES at bedtime  metoprolol tartrate 25 milliGRAM(s) Oral every 8 hours  multivitamin 1 Tablet(s) Oral daily  polyethylene glycol 3350 17 Gram(s) Oral <User Schedule>    MEDICATIONS  (PRN):  morphine Concentrate 5 milliGRAM(s) Oral every 4 hours PRN pain or dyspnea  senna 2 Tablet(s) Oral at bedtime PRN Constipation    Labs:                        10.6   20.10 )-----------( 152      ( 03 Mar 2019 05:56 )             33.0                         10.3   17.45 )-----------( 153      ( 02 Mar 2019 12:32 )             30.8     03 Mar 2019 05:56    145    |  106    |  90     ----------------------------<  77     3.8     |  30     |  1.5    02 Mar 2019 12:32    143    |  103    |  93     ----------------------------<  102    3.8     |  31     |  1.4      Ca    9.0        03 Mar 2019 05:56  Ca    9.0        02 Mar 2019 12:32  Mg     2.0       03 Mar 2019 05:56  Mg     2.0       02 Mar 2019 12:32    TPro  6.0    /  Alb  1.7    /  TBili  0.6    /  DBili  x      /  AST  107    /  ALT  44     /  AlkPhos  145    02 Mar 2019 12:32          General: comfortable, NAD        A/P:  BENI  peripheral edema  cirrhosis  liver masses on sonogram  HFpEF / mod mr and mod as with 55% EF on 1/19 echo  anemia  bloody bm  new afib  PNA  recent left hip fx, s/p orif complicated by post-op BENI  urine retention  altered mental status      off lasix  now on gentle ivf  wayne in place  palliative care f/u  dnr / dni  possible hospice  DVT prophylaxis  Decubitus prevention- all measures as per RN protocol  Please call or text me with any questions or updates

## 2019-03-03 NOTE — PROGRESS NOTE ADULT - ASSESSMENT
STARLA SPEAR   89y   Female    MRN#: [id number]         SUBJECTIVE  Patient is a 89y old Female who presents with a chief complaint of hypotension and SOB (03 Mar 2019 13:22)  Currently admitted to medicine with the primary diagnosis of Atrial fibrillation  Hospital course : Patient was admitted for hypotension - A VQ scan was performed and showed low probability for PE. A venous duplex of the lower extremities showed no evidence of DVT - Patient found to be in Afib not controlled on Cardizem - Transferred to [facility] for monitoring, Metoprolol added - 02/27 drop in Hb to 6.4, 3 units RBC given with improvement of Hb, CT scan non contrast performed and showed a collection (hematoma vs seroma) next to the left gamma nail -> ortho normal post-op finding not acute bleeding 2/2 heparin -Palliative care discussion between palliative car team and family on 02/28 02/28 : patient had brown movements mixed with blood - hemodynamically stable - STAT CBC showed no drop in Hb - patient was seen by GI MALCOLM performed and showed thrombosed hemorrhoids as well as rectal prolapse - patient started on bowel regimen to resolve disimpaction (stercoral colitis?) - patient has been sleeping more frequently, decreased PO intake and decreased urine output   03/01 : sinus rhythm noted on Telemetry   Today is hospital day 6d, and this morning she is sleeping comfortably in bed, unable to assess active complaints. No events overnight     Present Today:           Short Catheter : Yes - urinary retention           Central Line : X          IV Fluids : D% + NS0.9 30 mL/h           Drains : X      OBJECTIVE  ------------------------------------    PAST MEDICAL & SURGICAL HISTORY  HTN (hypertension)  No significant past surgical history      ALLERGIES:  No Known Allergies      MEDICATIONS:  STANDING MEDICATIONS  ascorbic acid 500 milliGRAM(s) Oral daily  aspirin enteric coated 325 milliGRAM(s) Oral daily  calcium carbonate 1250 mG  + Vitamin D (OsCal 500 + D) 1 Tablet(s) Oral three times a day  chlorhexidine 4% Liquid 1 Application(s) Topical <User Schedule>  collagenase Ointment 1 Application(s) Topical two times a day  Dakins Solution - Full Strength 1 Application(s) Topical two times a day  dextrose 5% + sodium chloride 0.9%. 1000 milliLiter(s) IV Continuous <Continuous>  diltiazem    Tablet 30 milliGRAM(s) Oral every 8 hours  docusate sodium 100 milliGRAM(s) Oral three times a day  ferrous    sulfate 325 milliGRAM(s) Oral daily  folic acid 1 milliGRAM(s) Oral daily  hydrocortisone hemorrhoidal Suppository 1 Suppository(s) Rectal at bedtime  latanoprost 0.005% Ophthalmic Solution 1 Drop(s) Both EYES at bedtime  metoprolol tartrate 25 milliGRAM(s) Oral every 8 hours  multivitamin 1 Tablet(s) Oral daily  polyethylene glycol 3350 17 Gram(s) Oral <User Schedule>    PRN MEDICATIONS  morphine Concentrate 5 milliGRAM(s) Oral every 4 hours PRN  senna 2 Tablet(s) Oral at bedtime PRN        LABS:                        10.6   20.10 )-----------( 152      ( 03 Mar 2019 05:56 )             33.0     03-03    145  |  106  |  90<HH>  ----------------------------<  77  3.8   |  30  |  1.5    Ca    9.0      03 Mar 2019 05:56  Mg     2.0     03-03    TPro  6.0  /  Alb  1.7<L>  /  TBili  0.6  /  DBili  x   /  AST  107<H>  /  ALT  44<H>  /  AlkPhos  145<H>  03-02                    RADIOLOGY:    No imaging studies performed     PHYSICAL EXAM:  VITAL SIGNS: Last 24 Hours  T(C): 35.3 (03 Mar 2019 13:22), Max: 36.1 (03 Mar 2019 06:27)  T(F): 95.5 (03 Mar 2019 13:22), Max: 97 (03 Mar 2019 06:27)  HR: 113 (03 Mar 2019 13:22) (94 - 115)  BP: 119/84 (03 Mar 2019 13:22) (112/59 - 120/72)  BP(mean): --  RR: 17 (03 Mar 2019 13:22) (17 - 18)  SpO2: --    GENERAL: No distress    PULMONARY: Clear lung fields bilaterally - no respiratory distress   CARDIOVASCULAR: Regular S1S2 - systolic ejection murmur    GASTROINTESTINAL: Soft, Nontender, Nondistended  MUSCULOSKELETAL:  + Peripheral Pulses, Lower extremity edema unchanged         ASSESSMENT & PLAN    89 year old lady with a PMHx of HTN presenting to the ED with difficulty breathing, found to be in new onset Afib in ED    #) New Onset Atrial Fibrillation - reverted to NSR   -CHADSVASC 4  -Heparin drip stopped as Drop in Hb to 6.4 02/27 - s/p 3 units stable since   -Metoprolol 25 q8 + Cardizem 30 q8  -cardio : Metoprolol as above - Heparin 5000 BID - No long term anticoagulation until liver pathology worked up     #) Hypotension, Anemia Hb 6.4 post heparin instatement - resolved , BRBPR 02/28  - Transfused 3 units so far - Hb stable - Active type and screen - hemodynamically stable   - CT scan abdomen and pelvis no contrast showed collection next to left gamma nail hematoma vs seroma -> spoke with orthopedics regarding results - per ortho normal finding post-op / chronic / not concerning / not exacerbated by heparin and wouldn't explain drop in Hb   - S/P cefepime (per ID because of lower opacities on CT scan)   - BRBPR : stercoral colitis vs thrombosed hemorrhoids -> bowel regimen with tap water enema q6 + Miralax twice daily + senna 2 tabs at bedtime - GI following    #) Stage IV sacral ulcer   - S/P bedside debridement by Dr. Meléndez 02/27  - wound care as per burn     #) Lower Extremity edema   -BNP 6349  -trop 0.04 -> 0.03 -> 0.03  -TTE (1/2019): EF 60%, moderate AS - Repeat TTE EF 60 % with Grade I diastolic dysfunction, moderate to severe AS     #) BENI on CKD - stable   -likely prerenal due to hypotension  -baseline Cr 1.0 1/2019 - creatinine ~ 1.5  -Nephro : Off antibiotics - off Lasix - gentle IVF - Continue Short     #) Findings consistent with liver cirrhosis and liver masses on US and CT scan - discussed results with son Inocencio, per son patient has no history of chronic liver disease and malignancy - at this time doesn't want workup   - Mixed cholestatic and hepatocellular - normal INR and normal bilirubin - Low Albumin    - Hepatitis panel : Reactive for hepatitis C     #) Folic Acid Deficiency   Continue with Folic acid.     #) Goals of care : discussion between palliative care team and family today - Family would like cardiac status to be optimized - no workup for liver masses - Family agrees that priority is for patient to be comfortable but would like to continue with current treatment and blood draws / transfusions as needed while patient is in hospital - DNR and DNI     # DVT prophylaxis : No AC for now   # GI prophylaxis : X  # Activity : Increase as tolerated   # Diet : Nectar thick   # Code : DNR / DNI - MOLST form signed and in chart  # Disposition : Family wants Hospice at [facility]

## 2019-03-04 NOTE — PROGRESS NOTE ADULT - ASSESSMENT
· Assessment		  · Assessment		  IMPRESSION:  CT with bibasilar opacities  PE with diffuse wheezes  GNR PNA  BCx NTD  WBC 29.5  New GNR PNA ?  Ongoing sepsis  BENI    plan-----------    family may consider Hospice     d/w resident    Alix for ORSA  D/c cefepime  BCx  UA/ UCx  Meropenem 500 mg iv q12h · Assessment		  · Assessment		  IMPRESSION:  CT with bibasilar opacities  PE with diffuse wheezes  GNR PNA  BCx NTD  WBC 29.5  New GNR PNA ?  Ongoing sepsis  BENI    plan-----------    family wants comfort measures  add Roxanol q 2hour       d/w resident    Nares for ORSA  D/c cefepime  BCx  UA/ UCx  Meropenem 500 mg iv q12h

## 2019-03-04 NOTE — PROGRESS NOTE ADULT - SUBJECTIVE AND OBJECTIVE BOX
89yFemale with diagnosis: ATRIAL FIBRILLATION      Patient seen for follow up - hospice nurse spoke with family and they want [facility]      PHYSICAL EXAM  Pt only minimally responsive on venti mask  (+) audible gurgle noted    T(C): , Max: 38.6 (05:07)  T(F): 101.5  HR: 86 (86 - 147)  BP: 101/74 (83/58 - 104/55)  RR: 17 (17 - 18)  SpO2: 92% (85% - 92%)              LABS:                          10.4   29.53 )-----------( 185      ( 04 Mar 2019 05:05 )             32.8                                                                                      03-04    150<H>  |  110  |  82<HH>  ----------------------------<  50<L>  3.7   |  26  |  1.8<H>    Ca    8.4<L>      04 Mar 2019 05:05  Mg     2.0     03-04    TPro  6.5  /  Alb  1.9<L>  /  TBili  0.9  /  DBili  x   /  AST  145<H>  /  ALT  51<H>  /  AlkPhos  163<H>  03-04                                                      MEDICATIONS  (STANDING):  chlorhexidine 4% Liquid 1 Application(s) Topical <User Schedule>  collagenase Ointment 1 Application(s) Topical two times a day  Dakins Solution - Full Strength 1 Application(s) Topical two times a day  glycopyrrolate Injectable 0.4 milliGRAM(s) IV Push once  morphine Concentrate 10 milliGRAM(s) Oral every 4 hours    MEDICATIONS  (PRN):  morphine Concentrate 5 milliGRAM(s) Oral every 2 hours PRN pain or dyspnea

## 2019-03-04 NOTE — PROGRESS NOTE ADULT - SUBJECTIVE AND OBJECTIVE BOX
ARTECA, STARLA  89y, Female      OVERNIGHT EVENTS:    fevers, lethargic, non communicative  no diarrhea  no cough/SOB    VITALS:  T(F): 101.5, Max: 101.5 (03-04-19 @ 05:07)  HR: 86  BP: 101/74  RR: 17Vital Signs Last 24 Hrs  T(C): 38.6 (04 Mar 2019 05:07), Max: 38.6 (04 Mar 2019 05:07)  T(F): 101.5 (04 Mar 2019 05:07), Max: 101.5 (04 Mar 2019 05:07)  HR: 86 (04 Mar 2019 05:07) (86 - 147)  BP: 101/74 (04 Mar 2019 05:07) (83/58 - 119/84)  BP(mean): --  RR: 17 (04 Mar 2019 05:07) (17 - 18)  SpO2: --    TESTS & MEASUREMENTS:                        10.4   29.53 )-----------( 185      ( 04 Mar 2019 05:05 )             32.8     03-04    150<H>  |  110  |  82<HH>  ----------------------------<  50<L>  3.7   |  26  |  1.8<H>    Ca    8.4<L>      04 Mar 2019 05:05  Mg     2.0     03-04    TPro  6.5  /  Alb  1.9<L>  /  TBili  0.9  /  DBili  x   /  AST  145<H>  /  ALT  51<H>  /  AlkPhos  163<H>  03-04    LIVER FUNCTIONS - ( 04 Mar 2019 05:05 )  Alb: 1.9 g/dL / Pro: 6.5 g/dL / ALK PHOS: 163 U/L / ALT: 51 U/L / AST: 145 U/L / GGT: x             Culture - Other (collected 02-27-19 @ 16:08)  Source: .Other wound  Final Report (03-02-19 @ 14:18):    Moderate Enterococcus faecalis (vancomycin resistant)    Moderate Coag Negative Staphylococcus "Susceptibilities not performed"    Few Alpha hemolytic strep "Susceptibilities not performed"    Numerous Corynebacterium species "Susceptibilities not performed"  Organism: Enterococcus faecalis (vancomycin resistant) (03-02-19 @ 17:08)  Organism: Enterococcus faecalis (vancomycin resistant) (03-02-19 @ 17:08)      -  Ampicillin: S <=2 Predicts results to ampicillin/sulbactam, amoxacillin-clavulanate and  piperacillin-tazobactam.      -  Ciprofloxacin: R >2      -  Daptomycin: S 2      -  Erythromycin: R >4      -  Levofloxacin: R >4      -  Linezolid: S 2      -  Penicillin: S 2      -  RIF- Rifampin: S <=1      -  Tetra/Doxy: I 8      -  Vancomycin: R >16      Method Type: JOSE    Culture - Blood (collected 02-27-19 @ 11:27)  Source: .Blood None  Preliminary Report (02-28-19 @ 17:02):    No growth to date.    Culture - Blood (collected 02-27-19 @ 09:45)  Source: .Blood Blood  Preliminary Report (02-28-19 @ 17:01):    No growth to date.    Culture - Blood (collected 02-25-19 @ 12:54)  Source: .Blood Blood  Final Report (03-02-19 @ 23:00):    No growth at 5 days.            RADIOLOGY & ADDITIONAL TESTS:    ANTIBIOTICS:

## 2019-03-04 NOTE — PROGRESS NOTE ADULT - SUBJECTIVE AND OBJECTIVE BOX
SUBJECTIVE:    Patient is a 89y old Female who presents with a chief complaint of hypotension and SOB (04 Mar 2019 07:40)    Currently admitted to medicine with the primary diagnosis of Atrial fibrillation     Today is hospital day 7d. This morning she is resting comfortably in bed and reports no new issues or overnight events.     PAST MEDICAL & SURGICAL HISTORY  HTN (hypertension)  No significant past surgical history    SOCIAL HISTORY:  Negative for smoking/alcohol/drug use.     ALLERGIES:  No Known Allergies    MEDICATIONS:  STANDING MEDICATIONS  ascorbic acid 500 milliGRAM(s) Oral daily  aspirin enteric coated 325 milliGRAM(s) Oral daily  calcium carbonate 1250 mG  + Vitamin D (OsCal 500 + D) 1 Tablet(s) Oral three times a day  chlorhexidine 4% Liquid 1 Application(s) Topical <User Schedule>  collagenase Ointment 1 Application(s) Topical two times a day  Dakins Solution - Full Strength 1 Application(s) Topical two times a day  dextrose 5%. 1000 milliLiter(s) IV Continuous <Continuous>  diltiazem    Tablet 30 milliGRAM(s) Oral every 8 hours  docusate sodium 100 milliGRAM(s) Oral three times a day  ferrous    sulfate 325 milliGRAM(s) Oral daily  folic acid 1 milliGRAM(s) Oral daily  hydrocortisone hemorrhoidal Suppository 1 Suppository(s) Rectal at bedtime  latanoprost 0.005% Ophthalmic Solution 1 Drop(s) Both EYES at bedtime  meropenem  IVPB      meropenem  IVPB 500 milliGRAM(s) IV Intermittent once  meropenem  IVPB 500 milliGRAM(s) IV Intermittent every 12 hours  metoprolol tartrate 25 milliGRAM(s) Oral every 8 hours  multivitamin 1 Tablet(s) Oral daily  polyethylene glycol 3350 17 Gram(s) Oral <User Schedule>    PRN MEDICATIONS  acetaminophen  Suppository .. 650 milliGRAM(s) Rectal every 6 hours PRN  morphine Concentrate 5 milliGRAM(s) Oral every 4 hours PRN  senna 2 Tablet(s) Oral at bedtime PRN    VITALS:   T(F): 101.5  HR: 86  BP: 101/74  RR: 17  SpO2: 92%    LABS:                        10.4   29.53 )-----------( 185      ( 04 Mar 2019 05:05 )             32.8     03-04    150<H>  |  110  |  82<HH>  ----------------------------<  50<L>  3.7   |  26  |  1.8<H>    Ca    8.4<L>      04 Mar 2019 05:05  Mg     2.0     03-04    TPro  6.5  /  Alb  1.9<L>  /  TBili  0.9  /  DBili  x   /  AST  145<H>  /  ALT  51<H>  /  AlkPhos  163<H>  03-04    PT/INR - ( 04 Mar 2019 05:05 )   PT: 16.40 sec;   INR: 1.43 ratio         PTT - ( 04 Mar 2019 05:05 )  PTT:33.4 sec              RADIOLOGY:    PHYSICAL EXAM:  GEN: not responsive hypoxia on vent mask   LUNGS: rhochi  HEART: Regular  ABD: Soft, non-tender, non-distended.  EXT: 2 plus edema   NEURO: AAOX3    Intravenous access:   NG tube:   Short Catheter:   Indwelling Urethral Catheter:     Connect To:  Leg Bag    Indication:  Urinary Retention / Obstruction (03-01-19 @ 16:27) (not performed) [active]

## 2019-03-04 NOTE — PROGRESS NOTE ADULT - ASSESSMENT
STARLA SPEAR   89y   Female    MRN#: [id number]         SUBJECTIVE  Patient is a 89y old Female who presents with a chief complaint of hypotension and SOB (04 Mar 2019 13:30)  Currently admitted to medicine with the primary diagnosis of Atrial fibrillation  Hospital course : Patient was admitted for hypotension - A VQ scan was performed and showed low probability for PE. A venous duplex of the lower extremities showed no evidence of DVT - Patient found to be in Afib not controlled on Cardizem - Transferred to [facility] for monitoring, Metoprolol added - 02/27 drop in Hb to 6.4, 3 units RBC given with improvement of Hb, CT scan non contrast performed and showed a collection (hematoma vs seroma) next to the left gamma nail -> ortho normal post-op finding not acute bleeding 2/2 heparin -Palliative care discussion between palliative car team and family on 02/28 02/28 : patient had brown movements mixed with blood - hemodynamically stable - STAT CBC showed no drop in Hb - patient was seen by GI MALCOLM performed and showed thrombosed hemorrhoids as well as rectal prolapse - patient started on bowel regimen to resolve disimpaction (stercoral colitis?) - patient has been sleeping more frequently, decreased PO intake and decreased urine output   03/01 : sinus rhythm noted on Telemetry   03/03 to 03/04 : clinical deterioration noted as patient becoming more lethargic and less responsive, respiratory   Today is hospital day 7d, and this morning she is _________ and reports ________ overnight events.     Present Today:           Short Catheter           Central Line          IV Fluids           Drains      OBJECTIVE  ------------------------------------    PAST MEDICAL & SURGICAL HISTORY  HTN (hypertension)  No significant past surgical history      ALLERGIES:  No Known Allergies      MEDICATIONS:  STANDING MEDICATIONS  chlorhexidine 4% Liquid 1 Application(s) Topical <User Schedule>  collagenase Ointment 1 Application(s) Topical two times a day  Dakins Solution - Full Strength 1 Application(s) Topical two times a day  glycopyrrolate Injectable 0.4 milliGRAM(s) IV Push once  morphine Concentrate 10 milliGRAM(s) Oral every 4 hours  scopolamine   Patch 1 Patch Transdermal every 72 hours    PRN MEDICATIONS  morphine Concentrate 5 milliGRAM(s) Oral every 2 hours PRN        LABS:                        10.4   29.53 )-----------( 185      ( 04 Mar 2019 05:05 )             32.8     03-04    150<H>  |  110  |  82<HH>  ----------------------------<  50<L>  3.7   |  26  |  1.8<H>    Ca    8.4<L>      04 Mar 2019 05:05  Mg     2.0     03-04    TPro  6.5  /  Alb  1.9<L>  /  TBili  0.9  /  DBili  x   /  AST  145<H>  /  ALT  51<H>  /  AlkPhos  163<H>  03-04    PT/INR - ( 04 Mar 2019 05:05 )   PT: 16.40 sec;   INR: 1.43 ratio         PTT - ( 04 Mar 2019 05:05 )  PTT:33.4 sec                RADIOLOGY:      PHYSICAL EXAM:  VITAL SIGNS: Last 24 Hours  T(C): 36.1 (04 Mar 2019 12:45), Max: 38.6 (04 Mar 2019 05:07)  T(F): 97 (04 Mar 2019 12:45), Max: 101.5 (04 Mar 2019 05:07)  HR: 138 (04 Mar 2019 12:45) (86 - 147)  BP: 98/51 (04 Mar 2019 12:45) (83/58 - 104/55)  BP(mean): --  RR: 18 (04 Mar 2019 12:45) (17 - 18)  SpO2: 92% (04 Mar 2019 09:05) (85% - 92%)    GENERAL: NAD, well-developed, AAOx3  HEENT:  Atraumatic, Normocephalic. EOMI, PERRLA, conjunctiva and sclera clear, No JVD  PULMONARY: Clear to auscultation bilaterally; No wheeze  CARDIOVASCULAR: Regular rate and rhythm; No murmurs, rubs, or gallops  GASTROINTESTINAL: Soft, Nontender, Nondistended; Bowel sounds present  MUSCULOSKELETAL:  2+ Peripheral Pulses, No clubbing, cyanosis, or edema  NEUROLOGY: non-focal  SKIN: No rashes or lesions      ASSESSMENT & PLAN      # DVT prophylaxis :  # GI prophylaxis :  # Activity :  # Diet :  # Code :  # Disposition : STARLA SPEAR   89y   Female    MRN#: [id number]         SUBJECTIVE  Patient is a 89y old Female who presents with a chief complaint of hypotension and SOB (04 Mar 2019 13:30)  Currently admitted to medicine with the primary diagnosis of Atrial fibrillation  Hospital course : Patient was admitted for hypotension - A VQ scan was performed and showed low probability for PE. A venous duplex of the lower extremities showed no evidence of DVT - Patient found to be in Afib not controlled on Cardizem - Transferred to [facility] for monitoring, Metoprolol added - 02/27 drop in Hb to 6.4, 3 units RBC given with improvement of Hb, CT scan non contrast performed and showed a collection (hematoma vs seroma) next to the left gamma nail -> ortho normal post-op finding not acute bleeding 2/2 heparin -Palliative care discussion between palliative car team and family on 02/28 02/28 : patient had brown movements mixed with blood - hemodynamically stable - STAT CBC showed no drop in Hb - patient was seen by GI MALCOLM performed and showed thrombosed hemorrhoids as well as rectal prolapse - patient started on bowel regimen to resolve disimpaction (stercoral colitis?) - patient has been sleeping more frequently, decreased PO intake and decreased urine output   03/01 : sinus rhythm noted on Telemetry   03/03 to 03/04 : clinical deterioration noted as patient becoming more lethargic and less responsive, respiratory distress and upper airway congestion   Today is hospital day 7d, overnight the patient was hypotensive and was given 250 mL bolus with improvement of her BP. This morning patient is very lethargic and hard to arouse, upper airway congestion noted. Unable to assess active complaints     Present Today:           Short Catheter : yes           Central Line : X          IV Fluids : Patient was on D5 + 1/2 ND \          Drains      OBJECTIVE  ------------------------------------    PAST MEDICAL & SURGICAL HISTORY  HTN (hypertension)  No significant past surgical history      ALLERGIES:  No Known Allergies      MEDICATIONS:  STANDING MEDICATIONS  chlorhexidine 4% Liquid 1 Application(s) Topical <User Schedule>  collagenase Ointment 1 Application(s) Topical two times a day  Dakins Solution - Full Strength 1 Application(s) Topical two times a day  glycopyrrolate Injectable 0.4 milliGRAM(s) IV Push once  morphine Concentrate 10 milliGRAM(s) Oral every 4 hours  scopolamine   Patch 1 Patch Transdermal every 72 hours    PRN MEDICATIONS  morphine Concentrate 5 milliGRAM(s) Oral every 2 hours PRN        LABS:                        10.4   29.53 )-----------( 185      ( 04 Mar 2019 05:05 )             32.8     03-04    150<H>  |  110  |  82<HH>  ----------------------------<  50<L>  3.7   |  26  |  1.8<H>    Ca    8.4<L>      04 Mar 2019 05:05  Mg     2.0     03-04    TPro  6.5  /  Alb  1.9<L>  /  TBili  0.9  /  DBili  x   /  AST  145<H>  /  ALT  51<H>  /  AlkPhos  163<H>  03-04    PT/INR - ( 04 Mar 2019 05:05 )   PT: 16.40 sec;   INR: 1.43 ratio         PTT - ( 04 Mar 2019 05:05 )  PTT:33.4 sec                RADIOLOGY:      PHYSICAL EXAM:  VITAL SIGNS: Last 24 Hours  T(C): 36.1 (04 Mar 2019 12:45), Max: 38.6 (04 Mar 2019 05:07)  T(F): 97 (04 Mar 2019 12:45), Max: 101.5 (04 Mar 2019 05:07)  HR: 138 (04 Mar 2019 12:45) (86 - 147)  BP: 98/51 (04 Mar 2019 12:45) (83/58 - 104/55)  BP(mean): --  RR: 18 (04 Mar 2019 12:45) (17 - 18)  SpO2: 92% (04 Mar 2019 09:05) (85% - 92%)    GENERAL: NAD, well-developed, AAOx3  HEENT:  Atraumatic, Normocephalic. EOMI, PERRLA, conjunctiva and sclera clear, No JVD  PULMONARY: Clear to auscultation bilaterally; No wheeze  CARDIOVASCULAR: Regular rate and rhythm; No murmurs, rubs, or gallops  GASTROINTESTINAL: Soft, Nontender, Nondistended; Bowel sounds present  MUSCULOSKELETAL:  2+ Peripheral Pulses, No clubbing, cyanosis, or edema  NEUROLOGY: non-focal  SKIN: No rashes or lesions      ASSESSMENT & PLAN      # DVT prophylaxis :  # GI prophylaxis :  # Activity :  # Diet :  # Code :  # Disposition : STARLA SPEAR   89y   Female    MRN#: [id number]         SUBJECTIVE  Patient is a 89y old Female who presents with a chief complaint of hypotension and SOB (04 Mar 2019 13:30)  Currently admitted to medicine with the primary diagnosis of Atrial fibrillation  Hospital course : Patient was admitted for hypotension - A VQ scan was performed and showed low probability for PE. A venous duplex of the lower extremities showed no evidence of DVT - Patient found to be in Afib not controlled on Cardizem - Transferred to [facility] for monitoring, Metoprolol added - 02/27 drop in Hb to 6.4, 3 units RBC given with improvement of Hb, CT scan non contrast performed and showed a collection (hematoma vs seroma) next to the left gamma nail -> ortho normal post-op finding not acute bleeding 2/2 heparin -Palliative care discussion between palliative car team and family on 02/28 02/28 : patient had brown movements mixed with blood - hemodynamically stable - STAT CBC showed no drop in Hb - patient was seen by GI MALCOLM performed and showed thrombosed hemorrhoids as well as rectal prolapse - patient started on bowel regimen to resolve disimpaction (stercoral colitis?) - patient has been sleeping more frequently, decreased PO intake and decreased urine output   03/01 : sinus rhythm noted on Telemetry   03/03 to 03/04 : clinical deterioration noted as patient becoming more lethargic and less responsive, respiratory distress and upper airway congestion   Today is hospital day 7d, overnight the patient was hypotensive and was given 250 mL bolus with improvement of her BP. This morning patient is very lethargic and hard to arouse, upper airway congestion noted. Unable to assess active complaints     Present Today:           Short Catheter : yes           Central Line : X          IV Fluids : Patient was on D5 + 1/2 ND \          Drains      OBJECTIVE  ------------------------------------    PAST MEDICAL & SURGICAL HISTORY  HTN (hypertension)  No significant past surgical history      ALLERGIES:  No Known Allergies      MEDICATIONS:  STANDING MEDICATIONS  chlorhexidine 4% Liquid 1 Application(s) Topical <User Schedule>  collagenase Ointment 1 Application(s) Topical two times a day  Dakins Solution - Full Strength 1 Application(s) Topical two times a day  glycopyrrolate Injectable 0.4 milliGRAM(s) IV Push once  morphine Concentrate 10 milliGRAM(s) Oral every 4 hours  scopolamine   Patch 1 Patch Transdermal every 72 hours    PRN MEDICATIONS  morphine Concentrate 5 milliGRAM(s) Oral every 2 hours PRN        LABS:                        10.4   29.53 )-----------( 185      ( 04 Mar 2019 05:05 )             32.8     03-04    150<H>  |  110  |  82<HH>  ----------------------------<  50<L>  3.7   |  26  |  1.8<H>    Ca    8.4<L>      04 Mar 2019 05:05  Mg     2.0     03-04    TPro  6.5  /  Alb  1.9<L>  /  TBili  0.9  /  DBili  x   /  AST  145<H>  /  ALT  51<H>  /  AlkPhos  163<H>  03-04    PT/INR - ( 04 Mar 2019 05:05 )   PT: 16.40 sec;   INR: 1.43 ratio         PTT - ( 04 Mar 2019 05:05 )  PTT:33.4 sec                RADIOLOGY:    Chest x ray  Stable     PHYSICAL EXAM:  VITAL SIGNS: Last 24 Hours  T(C): 36.1 (04 Mar 2019 12:45), Max: 38.6 (04 Mar 2019 05:07)  T(F): 97 (04 Mar 2019 12:45), Max: 101.5 (04 Mar 2019 05:07)  HR: 138 (04 Mar 2019 12:45) (86 - 147)  BP: 98/51 (04 Mar 2019 12:45) (83/58 - 104/55)  BP(mean): --  RR: 18 (04 Mar 2019 12:45) (17 - 18)  SpO2: 92% (04 Mar 2019 09:05) (85% - 92%)    GENERAL: NAD, well-developed, AAOx3  HEENT:  Atraumatic, Normocephalic. EOMI, PERRLA, conjunctiva and sclera clear, No JVD  PULMONARY: Clear to auscultation bilaterally; No wheeze  CARDIOVASCULAR: Regular rate and rhythm; No murmurs, rubs, or gallops  GASTROINTESTINAL: Soft, Nontender, Nondistended; Bowel sounds present  MUSCULOSKELETAL:  2+ Peripheral Pulses, No clubbing, cyanosis, or edema  NEUROLOGY: non-focal  SKIN: No rashes or lesions      ASSESSMENT & PLAN      # DVT prophylaxis :  # GI prophylaxis :  # Activity :  # Diet :  # Code :  # Disposition : STARLA SPEAR   89y   Female    MRN#: [id number]         SUBJECTIVE  Patient is a 89y old Female who presents with a chief complaint of hypotension and SOB (04 Mar 2019 13:30)  Currently admitted to medicine with the primary diagnosis of Atrial fibrillation  Hospital course : Patient was admitted for hypotension - A VQ scan was performed and showed low probability for PE. A venous duplex of the lower extremities showed no evidence of DVT - Patient found to be in Afib not controlled on Cardizem - Transferred to [facility] for monitoring, Metoprolol added - 02/27 drop in Hb to 6.4, 3 units RBC given with improvement of Hb, CT scan non contrast performed and showed a collection (hematoma vs seroma) next to the left gamma nail -> ortho normal post-op finding not acute bleeding 2/2 heparin -Palliative care discussion between palliative car team and family on 02/28 02/28 : patient had brown movements mixed with blood - hemodynamically stable - STAT CBC showed no drop in Hb - patient was seen by GI MALCOLM performed and showed thrombosed hemorrhoids as well as rectal prolapse - patient started on bowel regimen to resolve disimpaction (stercoral colitis?) - patient has been sleeping more frequently, decreased PO intake and decreased urine output   03/01 : sinus rhythm noted on Telemetry   03/03 to 03/04 : clinical deterioration noted as patient becoming more lethargic and less responsive, respiratory distress and upper airway congestion   Today is hospital day 7d, overnight the patient was hypotensive and was given 250 mL bolus with improvement of her BP. This morning patient is very lethargic and hard to arouse, upper airway congestion noted. Unable to assess active complaints     Present Today:           Short Catheter : yes           Central Line : X          IV Fluids : Patient was on D5 + 1/2 ND \          Drains      OBJECTIVE  ------------------------------------    PAST MEDICAL & SURGICAL HISTORY  HTN (hypertension)  No significant past surgical history      ALLERGIES:  No Known Allergies      MEDICATIONS:  STANDING MEDICATIONS  chlorhexidine 4% Liquid 1 Application(s) Topical <User Schedule>  collagenase Ointment 1 Application(s) Topical two times a day  Dakins Solution - Full Strength 1 Application(s) Topical two times a day  glycopyrrolate Injectable 0.4 milliGRAM(s) IV Push once  morphine Concentrate 10 milliGRAM(s) Oral every 4 hours  scopolamine   Patch 1 Patch Transdermal every 72 hours    PRN MEDICATIONS  morphine Concentrate 5 milliGRAM(s) Oral every 2 hours PRN        LABS:                        10.4   29.53 )-----------( 185      ( 04 Mar 2019 05:05 )             32.8     03-04    150<H>  |  110  |  82<HH>  ----------------------------<  50<L>  3.7   |  26  |  1.8<H>    Ca    8.4<L>      04 Mar 2019 05:05  Mg     2.0     03-04    TPro  6.5  /  Alb  1.9<L>  /  TBili  0.9  /  DBili  x   /  AST  145<H>  /  ALT  51<H>  /  AlkPhos  163<H>  03-04    PT/INR - ( 04 Mar 2019 05:05 )   PT: 16.40 sec;   INR: 1.43 ratio         PTT - ( 04 Mar 2019 05:05 )  PTT:33.4 sec                RADIOLOGY:    Chest x ray  Stable bibasilar opacities     PHYSICAL EXAM:  VITAL SIGNS: Last 24 Hours  T(C): 36.1 (04 Mar 2019 12:45), Max: 38.6 (04 Mar 2019 05:07)  T(F): 97 (04 Mar 2019 12:45), Max: 101.5 (04 Mar 2019 05:07)  HR: 138 (04 Mar 2019 12:45) (86 - 147)  BP: 98/51 (04 Mar 2019 12:45) (83/58 - 104/55)  BP(mean): --  RR: 18 (04 Mar 2019 12:45) (17 - 18)  SpO2: 92% (04 Mar 2019 09:05) (85% - 92%)    GENERAL: Patient in respiratory distress - lethargic hard to arouse   PULMONARY: Bilateral rhonchi noted with respiratory distress - upper airway congestion - on O2 mask   CARDIOVASCULAR: Regular S1S2 - systolic ejection murmur    GASTROINTESTINAL: Soft, Nontender, Nondistended  MUSCULOSKELETAL:  + Peripheral Pulses, Lower extremity edema worsening       ASSESSMENT & PLAN    89 year old lady with a PMHx of HTN presenting to the ED with difficulty breathing, found to be in new onset Afib in ED    #) New Onset Atrial Fibrillation - reverted to NSR   -CHADSVASC 4  -Heparin drip stopped as Drop in Hb to 6.4 02/27 - s/p 3 units stable since   -Metoprolol 25 q8 + Cardizem 30 q8  -cardio : Metoprolol as above - Heparin 5000 BID - No long term anticoagulation until liver pathology worked up   Hospice discussed with family, comfort measures only for the time being - cardio meds discontinued     #) Hypotension, Anemia Hb 6.4 post heparin instatement - resolved , BRBPR 02/28  - Transfused 3 units so far - Hb stable - Active type and screen - hemodynamically stable   - CT scan abdomen and pelvis no contrast showed collection next to left gamma nail hematoma vs seroma -> spoke with orthopedics regarding results - per ortho normal finding post-op / chronic / not concerning / not exacerbated by heparin and wouldn't explain drop in Hb   - S/P cefepime (per ID because of lower opacities on CT scan)   - BRBPR : stercoral colitis vs thrombosed hemorrhoids -> bowel regimen with tap water enema q6 + Miralax twice daily + senna 2 tabs at bedtime   Hospice discussed with family, comfort measures only for the time being - GI meds and suppositories discontinued     #) Stage IV sacral ulcer   - S/P bedside debridement by Dr. Meléndez 02/27  - wound care     #) Lower Extremity edema   -BNP 6349  -trop 0.04 -> 0.03 -> 0.03  -TTE (1/2019): EF 60%, moderate AS - Repeat TTE EF 60 % with Grade I diastolic dysfunction, moderate to severe AS     #) BENI on CKD - stable   -likely prerenal due to hypotension  -baseline Cr 1.0 1/2019 - creatinine ~ 1.5  -Nephro : Off antibiotics - off Lasix - gentle IVF - Continue Short   Hospice discussed with family, comfort measures only for the time being - IVF discontinued     #) Findings consistent with liver cirrhosis and liver masses on US and CT scan - discussed results with son Inocencio, per son patient has no history of chronic liver disease and malignancy - at this time doesn't want workup   - Mixed cholestatic and hepatocellular - normal INR and normal bilirubin - Low Albumin    - Hepatitis panel : Reactive for hepatitis C     #) Folic Acid Deficiency   Continue with Folic acid.     #) Goals of care : Discussion made with family between hospice and palliative care team and family as condition deteriorated - family decided to go with comfort measures only with no oral / IV meds, no IVF, no blood draws and no imaging studies - MOLST form updated     # DVT prophylaxis : No AC for now   # GI prophylaxis : X  # Activity : Increase as tolerated   # Diet : Nectar thick   # Code : DNR / DNI - MOLST form signed and in chart  # Disposition : In hospital - Patient unstable to be transferred to a hospice facility

## 2019-03-04 NOTE — PROGRESS NOTE ADULT - SUBJECTIVE AND OBJECTIVE BOX
NEPHROLOGY FOLLOW UP NOTE    pt seen and examined  d/w team and resident  palliative care input noted  pt doing poorly      PAST MEDICAL & SURGICAL HISTORY:  HTN (hypertension)  No significant past surgical history    Allergies:  No Known Allergies    Home Medications Reviewed    SOCIAL HISTORY:  Denies ETOH,Smoking,   FAMILY HISTORY:  No pertinent family history in first degree relatives        REVIEW OF SYSTEMS:  poor historian  All other review of systems is negative unless indicated above.      PHYSICAL EXAM:  NAD  obtunded  frail  Mentasta  dry mm  b/l rhonchi  rrr  soft, nt  2+ edema  + [facility] Medications:   MEDICATIONS  (STANDING):  chlorhexidine 4% Liquid 1 Application(s) Topical <User Schedule>  collagenase Ointment 1 Application(s) Topical two times a day  Dakins Solution - Full Strength 1 Application(s) Topical two times a day  glycopyrrolate Injectable 0.4 milliGRAM(s) IV Push once  morphine Concentrate 10 milliGRAM(s) Oral every 4 hours  scopolamine   Patch 1 Patch Transdermal every 72 hours        VITALS:  T(F): 97 (03-04-19 @ 12:45), Max: 101.5 (03-04-19 @ 05:07)  HR: 138 (03-04-19 @ 12:45)  BP: 98/51 (03-04-19 @ 12:45)  RR: 18 (03-04-19 @ 12:45)  SpO2: 92% (03-04-19 @ 09:05)  Wt(kg): --    03-02 @ 07:01  -  03-03 @ 07:00  --------------------------------------------------------  IN: 360 mL / OUT: 700 mL / NET: -340 mL    03-03 @ 07:01  -  03-04 @ 07:00  --------------------------------------------------------  IN: 360 mL / OUT: 750 mL / NET: -390 mL    03-04 @ 07:01  -  03-04 @ 14:38  --------------------------------------------------------  IN: 105 mL / OUT: 0 mL / NET: 105 mL      Height (cm): 157.48 (03-04 @ 11:04)  Weight (kg): 57.1 (03-04 @ 11:04)  BMI (kg/m2): 23 (03-04 @ 11:04)  BSA (m2): 1.57 (03-04 @ 11:04)    LABS:  03-04    150<H>  |  110  |  82<HH>  ----------------------------<  50<L>  3.7   |  26  |  1.8<H>    Ca    8.4<L>      04 Mar 2019 05:05  Mg     2.0     03-04    TPro  6.5  /  Alb  1.9<L>  /  TBili  0.9  /  DBili      /  AST  145<H>  /  ALT  51<H>  /  AlkPhos  163<H>  03-04                          10.4   29.53 )-----------( 185      ( 04 Mar 2019 05:05 )             32.8       Urine Studies:        RADIOLOGY & ADDITIONAL STUDIES:

## 2019-03-04 NOTE — PROGRESS NOTE ADULT - ASSESSMENT
BENI - stable  peripheral edema - better  cirrhosis  liver masses on sonogram  HFpEF / mod mr and mod as with 55% EF on 1/19 echo  anemia  bloody bm  new afib  PNA  recent left hip fx, s/p orif complicated by post-op BENI  urine retention  altered mental status - cefepime neurotoxicity - now better off abx    plan:    comfort measures only now  no further lab draws  palliative care f/u  dnr / dni  hospice  d/w team  poor overall prognosis

## 2019-03-04 NOTE — PROGRESS NOTE ADULT - ASSESSMENT
· Assessment		  · Assessment		  IMPRESSION:  CT with bibasilar opacities  PE with diffuse wheezes  GNR PNA  BCx NTD  WBC 29.5  New GNR PNA ?  Ongoing sepsis  BENI        RECOMMENDATIONS  Nares for ORSA  D/c cefepime  BCx  UA/ UCx  Meropenem 500 mg iv q12h

## 2019-03-04 NOTE — PROGRESS NOTE ADULT - ASSESSMENT
89yFemale being evaluated for goals of care and symptom management. Spoke to pt's son Inocencio, confirmed comfort measures only. Pt on venti mask, just received ROxanol 5mg. Asked nurse to decrease O2 via venti mask. Goal to remove mask. Son will be here later will remove then.           Recommendations:  DNR/DNI  Comfort measures only  d/c O2 monitoring  watch for dyspnea/pain  start Roxanol 10mg SL Q 4 hrs standing  Rubinol 0.4mg Iv x 1 now  Scopolamine patch start today  palliative following

## 2019-03-04 NOTE — CHART NOTE - NSCHARTNOTEFT_GEN_A_CORE
Registered Dietitian Follow-Up     Patient Profile Reviewed                           Yes [x]   No []     Nutrition History Previously Obtained        Yes [x]  No []       Pertinent Subjective Information: Pt. very lethargic today, per RN pt. is NPO d/t lethargy. Hospice following, pt. is supposed to be discharged to [facility] hospice, DNR/DNI, but pt. is NOT comfort measures only at this time.      Pertinent Medical Interventions:  New Onset Atrial Fibrillation - reverted to NSR: cardio following.  Hypotension, Anemia Hb 6.4 post heparin instatement - resolved , BRBPR.  Stage IV sacral ulcer- s/p debridement. Lower Extremity edema- moderate to severe AS. BENI on CKD - stable. Findings consistent with liver cirrhosis and liver masses on US and CT scan - no further work up per family request.        Diet order: NPO     Anthropometrics:  - Ht. 157.4cm   - Wt. 54.6kg on 3/3 vs. 57.1kg on 2/28- ?wt trending down, pt. with edema, will continue to monitor wt trends   - %wt change  - BMI 23.0  - IBW      Pertinent Lab Data: (3/4) WBC 29.53, RBC 3.43, Hg 10.4, Hct 32.8, Na 150, BUN 82, creat 1.8, glu 50, , ALT 57, eGFR 24      Pertinent Meds: Miralax, Metoprolol, vit C, Oscal, Ferrous sulfate, Folic acid, MVI, Senna, D5 at 30ml/h       Physical Findings:  - Appearance: lethargic, 3+ L, R foot, 4+ to L, R leg  - GI function: abd soft/nontender, last BM 3/2  - Tubes: no tubes noted   - Oral/Mouth cavity: NPO  - Skin: pressure ulcer stage IV to sacrum      Nutrition Requirements  Weight Used: 57.1kg      Estimated Energy Needs    Continue [x]  Adjust [] 1111-1389kcal (MSJ x 1.2-1.5 AF) for pressure ulcer, advanced age  Adjusted Energy Recommendations:   kcal/day        Estimated Protein Needs    Continue [x]  Adjust [] 68-74g (1.2-1.3g/kg CBW) for pressure ulcer/ BENI on CKD will monitor renal profile/adjust PRN  Adjusted Protein Recommendations:   gm/day        Estimated Fluid Needs        Continue [x]  Adjust [] 1ml/kcal or per LIP  Adjusted Fluid Recommendations:   mL/day     Nutrient Intake: NPO        [] Previous Nutrition Diagnosis:  Increased nutrient needs            [x] Ongoing          [] Resolved       Nutrition Intervention: meals and snacks, medical food supplement, vitamin and mineral supplement,  enteral and parenteral nutrition    Rec: When medically feasible advance diet back to DASH/TLC and add Ensure pudding TID, Ensure compact BID. Continue MVI and vit C daily.  If unable to advance diet, consider enteral nutrition if appropriate with GOC/family wishes.       Goal/Expected Outcome: In 3 days pt. diet to advance or enteral nutrition to provide >85% est energy needs, but not exceed 105%     Indicator/Monitoring: diet order, energy intake, body composition, NFPF, electrolyte/renal profile

## 2019-03-05 NOTE — PROGRESS NOTE ADULT - SUBJECTIVE AND OBJECTIVE BOX
NEPHROLOGY FOLLOW UP NOTE    d/w team and resident  on comort measures  off ivf and abx  pt doing poorly  nonverbal      PAST MEDICAL & SURGICAL HISTORY:  HTN (hypertension)  No significant past surgical history    Allergies:  No Known Allergies    Home Medications Reviewed    SOCIAL HISTORY:  Denies ETOH,Smoking,   FAMILY HISTORY:  No pertinent family history in first degree relatives        REVIEW OF SYSTEMS:  poor historian  All other review of systems is negative unless indicated above.      PHYSICAL EXAM:  NAD  obtunded  frail  [place] IRA  dry mm  b/l rhonchi  rrr  soft, nt  2+ edema  + [facility] Medications:   MEDICATIONS  (STANDING):  chlorhexidine 4% Liquid 1 Application(s) Topical <User Schedule>  collagenase Ointment 1 Application(s) Topical two times a day  Dakins Solution - Full Strength 1 Application(s) Topical daily  morphine Concentrate 10 milliGRAM(s) Oral every 4 hours  scopolamine   Patch 1 Patch Transdermal every 72 hours        VITALS:  T(F): --  HR: --  BP: --  RR: --  SpO2: --  Wt(kg): --    03-03 @ 07:01  -  03-04 @ 07:00  --------------------------------------------------------  IN: 360 mL / OUT: 750 mL / NET: -390 mL    03-04 @ 07:01  -  03-05 @ 07:00  --------------------------------------------------------  IN: 105 mL / OUT: 200 mL / NET: -95 mL          LABS:  03-04    150<H>  |  110  |  82<HH>  ----------------------------<  50<L>  3.7   |  26  |  1.8<H>    Ca    8.4<L>      04 Mar 2019 05:05  Mg     2.0     03-04    TPro  6.5  /  Alb  1.9<L>  /  TBili  0.9  /  DBili      /  AST  145<H>  /  ALT  51<H>  /  AlkPhos  163<H>  03-04                          10.4   29.53 )-----------( 185      ( 04 Mar 2019 05:05 )             32.8       Urine Studies:        RADIOLOGY & ADDITIONAL STUDIES:

## 2019-03-05 NOTE — PROGRESS NOTE ADULT - SUBJECTIVE AND OBJECTIVE BOX
Patient was seen and examined. Spoke with RN. Chart reviewed.  No events overnight.  Vital Signs Last 24 Hrs  T(F): 97 (04 Mar 2019 12:45), Max: 97 (04 Mar 2019 12:45)  HR: 138 (04 Mar 2019 12:45) (138 - 138)  BP: 98/51 (04 Mar 2019 12:45) (98/51 - 98/51)  SpO2: 92% (04 Mar 2019 09:05) (92% - 92%)  MEDICATIONS  (STANDING):  chlorhexidine 4% Liquid 1 Application(s) Topical <User Schedule>  collagenase Ointment 1 Application(s) Topical two times a day  Dakins Solution - Full Strength 1 Application(s) Topical daily  morphine Concentrate 10 milliGRAM(s) Oral every 4 hours  scopolamine   Patch 1 Patch Transdermal every 72 hours    MEDICATIONS  (PRN):  morphine Concentrate 5 milliGRAM(s) Oral every 2 hours PRN pain or dyspnea    Labs:                        10.4   29.53 )-----------( 185      ( 04 Mar 2019 05:05 )             32.8     04 Mar 2019 05:05    150    |  110    |  82     ----------------------------<  50     3.7     |  26     |  1.8      Ca    8.4        04 Mar 2019 05:05  Mg     2.0       04 Mar 2019 05:05    TPro  6.5    /  Alb  1.9    /  TBili  0.9    /  DBili  x      /  AST  145    /  ALT  51     /  AlkPhos  163    04 Mar 2019 05:05    PT/INR - ( 04 Mar 2019 05:05 )   PT: 16.40 sec;   INR: 1.43 ratio         PTT - ( 04 Mar 2019 05:05 )  PTT:33.4 sec      General: comfortable, NAD  Neurology:  nonfocal  Head:  Normocephalic, atraumatic  ENT:  Mucosa moist, no ulcerations  Neck:  Supple, no JVD,   Resp: CTA B/L  CV: RRR, S1S2,   GI: Soft, NT, bowel sounds  MS: No edema, + peripheral pulses,       A/P:  89yFemale being evaluated for goals of care and symptom management.    DNR/DNI  Comfort measures only  d/c O2 monitoring  watch for dyspnea/pain  start Roxanol 10mg SL Q 4 hrs standing  Rubinol 0.4mg Iv x 1 now  Scopolamine patch start today  palliative following  DVT prophylaxis  Decubitus prevention- all measures as per RN protocol  Please call or text me with any questions or updates

## 2019-03-05 NOTE — CHART NOTE - NSCHARTNOTEFT_GEN_A_CORE
LMSW discussed case in Palliative Care rounds and reviewed pt.'s electronic medical record.  Patient is now comfort measures only and is in the dying process.  Encountered pt. lying in bed comfortably; nasal cannula in place, with no signs/symptoms of pain or other distress.  Pt.'s son who has recently flown in from out of town was at bedside, as well as pt.'s brother.  Education/verbal information regarding dying process provided to family.  Family is coping adaptively, and are accepting of pt.'s dying process.  Support provided.

## 2019-03-05 NOTE — PROGRESS NOTE ADULT - CARDIOVASCULAR
Regular rate & rhythm, normal S1, S2; no murmurs, gallops or rubs; no S3, S4
Regular rate & rhythm, normal S1, S2; no murmurs, gallops or rubs; no S3, S4
detailed exam
Regular rate & rhythm, normal S1, S2; no murmurs, gallops or rubs; no S3, S4
Regular rate & rhythm, normal S1, S2; no murmurs, gallops or rubs; no S3, S4

## 2019-03-05 NOTE — PROGRESS NOTE ADULT - ASSESSMENT
STARLA SPEAR   89y   Female    MRN#: [id number]         SUBJECTIVE  Patient is a 89y old Female who presents with a chief complaint of hypotension and SOB (05 Mar 2019 13:00)  Currently admitted to medicine with the primary diagnosis of Atrial fibrillation  Hospital course : Patient was admitted for hypotension - A VQ scan was performed and showed low probability for PE. A venous duplex of the lower extremities showed no evidence of DVT - Patient found to be in Afib not controlled on Cardizem - Transferred to [facility] for monitoring, Metoprolol added - 02/27 drop in Hb to 6.4, 3 units RBC given with improvement of Hb, CT scan non contrast performed and showed a collection (hematoma vs seroma) next to the left gamma nail -> ortho normal post-op finding not acute bleeding 2/2 heparin -Palliative care discussion between palliative car team and family on 02/28 02/28 : patient had brown movements mixed with blood - hemodynamically stable - STAT CBC showed no drop in Hb - patient was seen by GI MALCOLM performed and showed thrombosed hemorrhoids as well as rectal prolapse - patient started on bowel regimen to resolve disimpaction (stercoral colitis?) - patient has been sleeping more frequently, decreased PO intake and decreased urine output   03/01 : sinus rhythm noted on Telemetry   03/03 to 03/04 : clinical deterioration noted as patient becoming more lethargic and less responsive, respiratory distress and upper airway congestion - Plan of care discussed between palliative care / hospice and family and comfort measures decided - antibiotics, medications and IVF discontinued and patient started on Morphine and Scopolamine patch  Today is hospital day 8d, and this morning she is lethargic and hardly arousable respiratory distress and upper airway congestion still noted. No events overnight     Present Today:           Short Catheter : yes           Central Line X          IV Fluids X          Drains X      OBJECTIVE  ------------------------------------    PAST MEDICAL & SURGICAL HISTORY  HTN (hypertension)  No significant past surgical history      ALLERGIES:  No Known Allergies      MEDICATIONS:  STANDING MEDICATIONS  chlorhexidine 4% Liquid 1 Application(s) Topical <User Schedule>  collagenase Ointment 1 Application(s) Topical two times a day  Dakins Solution - Full Strength 1 Application(s) Topical daily  morphine Concentrate 10 milliGRAM(s) Oral every 4 hours  scopolamine   Patch 1 Patch Transdermal every 72 hours    PRN MEDICATIONS  morphine Concentrate 5 milliGRAM(s) Oral every 2 hours PRN        LABS:                        10.4   29.53 )-----------( 185      ( 04 Mar 2019 05:05 )             32.8     03-04    150<H>  |  110  |  82<HH>  ----------------------------<  50<L>  3.7   |  26  |  1.8<H>    Ca    8.4<L>      04 Mar 2019 05:05  Mg     2.0     03-04    TPro  6.5  /  Alb  1.9<L>  /  TBili  0.9  /  DBili  x   /  AST  145<H>  /  ALT  51<H>  /  AlkPhos  163<H>  03-04    PT/INR - ( 04 Mar 2019 05:05 )   PT: 16.40 sec;   INR: 1.43 ratio         PTT - ( 04 Mar 2019 05:05 )  PTT:33.4 sec                RADIOLOGY:    No imaging studies performed today    PHYSICAL EXAM:  VITAL SIGNS: Last 24 Hours  T(C): --  T(F): --  HR: --  BP: --  BP(mean): --  RR: --  SpO2: --    GENERAL: Patient in respiratory distress - lethargic hard to arouse   PULMONARY: Bilateral rhonchi noted with respiratory distress - upper airway congestion - on O2 NC  CARDIOVASCULAR: Regular S1S2 - systolic ejection murmur    GASTROINTESTINAL: Soft, Nontender, Nondistended  MUSCULOSKELETAL:  + Peripheral Pulses, Lower extremity edema       ASSESSMENT & PLAN    89 year old lady with a PMHx of HTN presenting to the ED with difficulty breathing, found to be in new onset Afib in ED    #) New Onset Atrial Fibrillation - reverted to NSR   -CHADSVASC 4  -Heparin drip stopped as Drop in Hb to 6.4 02/27 - s/p 3 units stable since   -Metoprolol 25 q8 + Cardizem 30 q8  -cardio : Metoprolol as above - Heparin 5000 BID - No long term anticoagulation until liver pathology worked up   Hospice discussed with family, comfort measures only for the time being - cardio meds discontinued     #) Hypotension, Anemia Hb 6.4 post heparin instatement - resolved , BRBPR 02/28  - Transfused 3 units so far - Hb stable - Active type and screen - hemodynamically stable   - CT scan abdomen and pelvis no contrast showed collection next to left gamma nail hematoma vs seroma -> spoke with orthopedics regarding results - per ortho normal finding post-op / chronic / not concerning / not exacerbated by heparin and wouldn't explain drop in Hb   - S/P cefepime (per ID because of lower opacities on CT scan)   - BRBPR : stercoral colitis vs thrombosed hemorrhoids -> bowel regimen with tap water enema q6 + Miralax twice daily + senna 2 tabs at bedtime   Hospice discussed with family, comfort measures only for the time being - GI meds and suppositories discontinued     #) Stage IV sacral ulcer   - S/P bedside debridement by Dr. Meléndez 02/27  - wound care once daily    #) Lower Extremity edema   -BNP 6349  -trop 0.04 -> 0.03 -> 0.03  -TTE (1/2019): EF 60%, moderate AS - Repeat TTE EF 60 % with Grade I diastolic dysfunction, moderate to severe AS     #) BENI on CKD - stable   -likely prerenal due to hypotension  -baseline Cr 1.0 1/2019 - last creatinine ~ 1.5  -Nephro : Off antibiotics - off Lasix - gentle IVF - Continue Short   Hospice discussed with family, comfort measures only for the time being - IVF discontinued     #) Findings consistent with liver cirrhosis and liver masses on US and CT scan - discussed results with son Inocencio, per son patient has no history of chronic liver disease and malignancy - at this time doesn't want workup   - Mixed cholestatic and hepatocellular - normal INR and normal bilirubin - Low Albumin    - Hepatitis panel : Reactive for hepatitis C     #) Folic Acid Deficiency   Continue with Folic acid.     #) Goals of care : Discussion made with family between hospice and palliative care team and family as condition deteriorated - family decided to go with comfort measures only with no oral / IV meds, no IVF, no blood draws and no imaging studies - MOLST form updated     # DVT prophylaxis : No AC for now   # GI prophylaxis : X  # Activity : Increase as tolerated   # Diet : Nectar thick   # Code : DNR / DNI - MOLST form signed and in chart  # Disposition : In hospital - Patient unstable to be transferred to a hospice facility

## 2019-03-05 NOTE — PROGRESS NOTE ADULT - GASTROINTESTINAL
detailed exam
detailed exam
Soft, non-tender, no hepatosplenomegaly, normal bowel sounds
detailed exam
Soft, non-tender, no hepatosplenomegaly, normal bowel sounds

## 2019-03-05 NOTE — PROGRESS NOTE ADULT - SUBJECTIVE AND OBJECTIVE BOX
ARTECA, STARLA  89y, Female      OVERNIGHT EVENTS:    no fevers, her usual state, nn communicative, no diarrhea, wayne in    VITALS:  T(F): 97, Max: 97 (03-04-19 @ 12:45)  HR: 138  BP: 98/51  RR: 18Vital Signs Last 24 Hrs  T(C): 36.1 (04 Mar 2019 12:45), Max: 36.1 (04 Mar 2019 12:45)  T(F): 97 (04 Mar 2019 12:45), Max: 97 (04 Mar 2019 12:45)  HR: 138 (04 Mar 2019 12:45) (138 - 138)  BP: 98/51 (04 Mar 2019 12:45) (98/51 - 98/51)  BP(mean): --  RR: 18 (04 Mar 2019 12:45) (18 - 18)  SpO2: 92% (04 Mar 2019 09:05) (85% - 92%)    TESTS & MEASUREMENTS:                        10.4   29.53 )-----------( 185      ( 04 Mar 2019 05:05 )             32.8     03-04    150<H>  |  110  |  82<HH>  ----------------------------<  50<L>  3.7   |  26  |  1.8<H>    Ca    8.4<L>      04 Mar 2019 05:05  Mg     2.0     03-04    TPro  6.5  /  Alb  1.9<L>  /  TBili  0.9  /  DBili  x   /  AST  145<H>  /  ALT  51<H>  /  AlkPhos  163<H>  03-04    LIVER FUNCTIONS - ( 04 Mar 2019 05:05 )  Alb: 1.9 g/dL / Pro: 6.5 g/dL / ALK PHOS: 163 U/L / ALT: 51 U/L / AST: 145 U/L / GGT: x             Culture - Other (collected 02-27-19 @ 16:08)  Source: .Other wound  Final Report (03-02-19 @ 14:18):    Moderate Enterococcus faecalis (vancomycin resistant)    Moderate Coag Negative Staphylococcus "Susceptibilities not performed"    Few Alpha hemolytic strep "Susceptibilities not performed"    Numerous Corynebacterium species "Susceptibilities not performed"  Organism: Enterococcus faecalis (vancomycin resistant) (03-02-19 @ 17:08)  Organism: Enterococcus faecalis (vancomycin resistant) (03-02-19 @ 17:08)      -  Ampicillin: S <=2 Predicts results to ampicillin/sulbactam, amoxacillin-clavulanate and  piperacillin-tazobactam.      -  Ciprofloxacin: R >2      -  Daptomycin: S 2      -  Erythromycin: R >4      -  Levofloxacin: R >4      -  Linezolid: S 2      -  Penicillin: S 2      -  RIF- Rifampin: S <=1      -  Tetra/Doxy: I 8      -  Vancomycin: R >16      Method Type: JOSE    Culture - Blood (collected 02-27-19 @ 11:27)  Source: .Blood None  Final Report (03-04-19 @ 17:01):    No growth at 5 days.    Culture - Blood (collected 02-27-19 @ 09:45)  Source: .Blood Blood  Final Report (03-04-19 @ 17:00):    No growth at 5 days.            RADIOLOGY & ADDITIONAL TESTS:    ANTIBIOTICS:

## 2019-03-06 NOTE — PROGRESS NOTE ADULT - ASSESSMENT
STARLA SPEAR   89y   Female    MRN#: [id number]         SUBJECTIVE  Patient is a 89y old Female who presents with a chief complaint of hypotension and SOB (06 Mar 2019 08:49)  Currently admitted to medicine with the primary diagnosis of Atrial fibrillation  Hospital course : Patient was admitted for hypotension - A VQ scan was performed and showed low probability for PE. A venous duplex of the lower extremities showed no evidence of DVT - Patient found to be in Afib not controlled on Cardizem - Transferred to [facility] for monitoring, Metoprolol added - 02/27 drop in Hb to 6.4, 3 units RBC given with improvement of Hb, CT scan non contrast performed and showed a collection (hematoma vs seroma) next to the left gamma nail -> ortho normal post-op finding not acute bleeding 2/2 heparin -Palliative care discussion between palliative car team and family on 02/28 02/28 : patient had brown movements mixed with blood - hemodynamically stable - STAT CBC showed no drop in Hb - patient was seen by GI MALCOLM performed and showed thrombosed hemorrhoids as well as rectal prolapse - patient started on bowel regimen to resolve disimpaction (stercoral colitis?) - patient has been sleeping more frequently, decreased PO intake and decreased urine output   03/01 : sinus rhythm noted on Telemetry   03/03 to 03/04 : clinical deterioration noted as patient becoming more lethargic and less responsive, respiratory distress and upper airway congestion - Plan of care discussed between palliative care / hospice and family and comfort measures decided - antibiotics, medications and IVF discontinued and patient started on Morphine and Scopolamine patch  Today is hospital day 9d, and this morning she is still obtunded and nonresponsive, comfortable in bed - no distress     Present Today:          Short Catheter : yes           Central Line X          IV Fluids X          Drains X      OBJECTIVE  ------------------------------------    PAST MEDICAL & SURGICAL HISTORY  HTN (hypertension)  No significant past surgical history      ALLERGIES:  No Known Allergies      MEDICATIONS:  STANDING MEDICATIONS  chlorhexidine 4% Liquid 1 Application(s) Topical <User Schedule>  collagenase Ointment 1 Application(s) Topical two times a day  Dakins Solution - Full Strength 1 Application(s) Topical daily  morphine Concentrate 10 milliGRAM(s) Oral every 4 hours  scopolamine   Patch 1 Patch Transdermal every 72 hours    PRN MEDICATIONS  morphine Concentrate 5 milliGRAM(s) Oral every 2 hours PRN        LABS:                    Culture - Blood (collected 04 Mar 2019 11:07)  Source: .Blood None  Preliminary Report (05 Mar 2019 23:01):    No growth to date.            RADIOLOGY:    No imaging studies performed     PHYSICAL EXAM:  VITAL SIGNS: Last 24 Hours  T(C): --  T(F): --  HR: --  BP: --  BP(mean): --  RR: --  SpO2: --    GENERAL: Comfortable in bed - obtunded   PULMONARY: Bilateral rhonchi  - upper airway congestion - on O2 NC  CARDIOVASCULAR: Regular S1S2 - systolic ejection murmur    GASTROINTESTINAL: Soft, Nontender, Nondistended  MUSCULOSKELETAL:  + Peripheral Pulses, Lower extremity edema - extremities cold to touch         ASSESSMENT & PLAN    89 year old lady with a PMHx of HTN presenting to the ED with difficulty breathing, found to be in new onset Afib in ED    #) New Onset Atrial Fibrillation - reverted to NSR   -CHADSVASC 4  -Heparin drip stopped as Drop in Hb to 6.4 02/27 - s/p 3 units stable since   -Metoprolol 25 q8 + Cardizem 30 q8  -cardio : Metoprolol as above - Heparin 5000 BID - No long term anticoagulation until liver pathology worked up   Hospice discussed with family, comfort measures only for the time being - cardio meds discontinued     #) Hypotension, Anemia Hb 6.4 post heparin instatement - resolved , BRBPR 02/28  - Transfused 3 units so far - Hb stable - Active type and screen - hemodynamically stable   - CT scan abdomen and pelvis no contrast showed collection next to left gamma nail hematoma vs seroma -> spoke with orthopedics regarding results - per ortho normal finding post-op / chronic / not concerning / not exacerbated by heparin and wouldn't explain drop in Hb   - S/P cefepime (per ID because of lower opacities on CT scan)   - BRBPR : stercoral colitis vs thrombosed hemorrhoids -> bowel regimen with tap water enema q6 + Miralax twice daily + senna 2 tabs at bedtime   Hospice discussed with family, comfort measures only for the time being - GI meds and suppositories discontinued     #) Stage IV sacral ulcer   - S/P bedside debridement by Dr. Meléndez 02/27  - wound care once daily    #) Lower Extremity edema   -BNP 6349  -trop 0.04 -> 0.03 -> 0.03  -TTE (1/2019): EF 60%, moderate AS - Repeat TTE EF 60 % with Grade I diastolic dysfunction, moderate to severe AS     #) BENI on CKD - stable   -likely prerenal due to hypotension  -baseline Cr 1.0 1/2019 - last creatinine ~ 1.5  -Nephro : Off antibiotics - off Lasix - gentle IVF - Continue Short   Hospice discussed with family, comfort measures only for the time being - IVF discontinued     #) Findings consistent with liver cirrhosis and liver masses on US and CT scan - discussed results with son Inocencio, per son patient has no history of chronic liver disease and malignancy - at this time doesn't want workup   - Mixed cholestatic and hepatocellular - normal INR and normal bilirubin - Low Albumin    - Hepatitis panel : Reactive for hepatitis C     #) Folic Acid Deficiency   Continue with Folic acid.     #) Goals of care : Discussion made with family between hospice and palliative care team and family as condition deteriorated - family decided to go with comfort measures only with no oral / IV meds, no IVF, no blood draws and no imaging studies - MOLST form updated     # DVT prophylaxis : No AC for now   # GI prophylaxis : X  # Activity : Increase as tolerated   # Diet : Nectar thick   # Code : DNR / DNI - MOLST form signed and in chart  # Disposition : In hospital - Patient unstable to be transferred to a hospice facility

## 2019-03-06 NOTE — PROGRESS NOTE ADULT - SUBJECTIVE AND OBJECTIVE BOX
Patient was seen and examined. Spoke with RN. Chart reviewed.  No events overnight.  Vital Signs Last 24 Hrs  T(F): --  HR: --  BP: --  SpO2: --  MEDICATIONS  (STANDING):  chlorhexidine 4% Liquid 1 Application(s) Topical <User Schedule>  collagenase Ointment 1 Application(s) Topical two times a day  Dakins Solution - Full Strength 1 Application(s) Topical daily  morphine Concentrate 10 milliGRAM(s) Oral every 4 hours  scopolamine   Patch 1 Patch Transdermal every 72 hours    MEDICATIONS  (PRN):  morphine Concentrate 5 milliGRAM(s) Oral every 2 hours PRN pain or dyspnea      Culture - Blood (collected 04 Mar 2019 11:07)  Source: .Blood None  Preliminary Report (05 Mar 2019 23:01):    No growth to date.      General: comfortable, NAD        A/P:  89yFemale being evaluated for goals of care and symptom management.    DNR/DNI  Comfort measures only  d/c O2 monitoring  watch for dyspnea/pain  start Roxanol 10mg SL Q 4 hrs standing  Scopolamine patch   palliative following  DVT prophylaxis  Decubitus prevention- all measures as per RN protocol  Please call or text me with any questions or updates

## 2019-03-07 NOTE — PROGRESS NOTE ADULT - SUBJECTIVE AND OBJECTIVE BOX
SUBJECTIVE:    Patient is a 89y old Female who presents with a chief complaint of hypotension and SOB (06 Mar 2019 08:49)    Currently admitted to medicine with the primary diagnosis of Atrial fibrillation     Today is hospital day 10d. heavy breathing    PAST MEDICAL & SURGICAL HISTORY  HTN (hypertension)  No significant past surgical history    SOCIAL HISTORY:  Negative for smoking/alcohol/drug use.     ALLERGIES:  No Known Allergies    MEDICATIONS:  STANDING MEDICATIONS  chlorhexidine 4% Liquid 1 Application(s) Topical <User Schedule>  collagenase Ointment 1 Application(s) Topical two times a day  Dakins Solution - Full Strength 1 Application(s) Topical daily  morphine Concentrate 10 milliGRAM(s) Oral every 4 hours  scopolamine   Patch 1 Patch Transdermal every 72 hours    PRN MEDICATIONS  morphine Concentrate 5 milliGRAM(s) Oral every 2 hours PRN    VITALS:   T(F): --  HR: --  BP: --  RR: --  SpO2: --    LABS:                        RADIOLOGY:    PHYSICAL EXAM:    Intravenous access:   NG tube:   Short Catheter:   Indwelling Urethral Catheter:     Connect To:  Leg Bag    Indication:  Urinary Retention / Obstruction (03-01-19 @ 16:27) (not performed) [active]

## 2019-03-07 NOTE — CHART NOTE - NSCHARTNOTEFT_GEN_A_CORE
Registered dietitian limited note    Pt. due for comprehensive follow up today, however pt. found to be comfort measures only. DNI/DNR.  No oral / IV meds, no IVF, no blood draws and no imaging studies. Pt. noted to be actively dying, unstable for transport to hospice. No RD intervention at this time. RD to sign off.

## 2019-03-07 NOTE — CHART NOTE - NSCHARTNOTEFT_GEN_A_CORE
LMSW discussed case in palliative care rounds.  Encountered pt. lying in bed, resting comfortable; nasal cannula in place.  Pt. did not have any signs or symptoms of pain or distress.  Pt.'s son at bedside who continues to cope adaptively.  Support provided.  DNR/I; comfort measures only.

## 2019-03-07 NOTE — PROGRESS NOTE ADULT - REASON FOR ADMISSION
hypotension and SOB

## 2019-03-07 NOTE — PROGRESS NOTE ADULT - ASSESSMENT
STARLA SPEAR   89y   Female    MRN#: [id number]         SUBJECTIVE  Patient is a 89y old Female who presents with a chief complaint of hypotension and SOB (07 Mar 2019 13:01)  Currently admitted to medicine with the primary diagnosis of Atrial fibrillation  Hospital course : Patient was admitted for hypotension - A VQ scan was performed and showed low probability for PE. A venous duplex of the lower extremities showed no evidence of DVT - Patient found to be in Afib not controlled on Cardizem - Transferred to [facility] for monitoring, Metoprolol added - 02/27 drop in Hb to 6.4, 3 units RBC given with improvement of Hb, CT scan non contrast performed and showed a collection (hematoma vs seroma) next to the left gamma nail -> ortho normal post-op finding not acute bleeding 2/2 heparin -Palliative care discussion between palliative car team and family on 02/28 02/28 : patient had brown movements mixed with blood - hemodynamically stable - STAT CBC showed no drop in Hb - patient was seen by GI MALCOLM performed and showed thrombosed hemorrhoids as well as rectal prolapse - patient started on bowel regimen to resolve disimpaction (stercoral colitis?) - patient has been sleeping more frequently, decreased PO intake and decreased urine output   03/01 : sinus rhythm noted on Telemetry   03/03 to 03/04 : clinical deterioration noted as patient becoming more lethargic and less responsive, respiratory distress and upper airway congestion - Plan of care discussed between palliative care / hospice and family and comfort measures decided - antibiotics, medications and IVF discontinued and patient started on Morphine and Scopolamine patch  Today is hospital day 10d, and this morning she is stable, resting comfortably in bed, bradypnea noted. No events overnight    Present Today:           Short Catheter : yes           Central Line : X          IV Fluids : X          Drains : X      OBJECTIVE  ------------------------------------    PAST MEDICAL & SURGICAL HISTORY  HTN (hypertension)  No significant past surgical history      ALLERGIES:  No Known Allergies      MEDICATIONS:  STANDING MEDICATIONS  chlorhexidine 4% Liquid 1 Application(s) Topical <User Schedule>  collagenase Ointment 1 Application(s) Topical two times a day  Dakins Solution - Full Strength 1 Application(s) Topical daily  morphine Concentrate 10 milliGRAM(s) Oral every 4 hours  scopolamine   Patch 1 Patch Transdermal every 72 hours    PRN MEDICATIONS  morphine Concentrate 5 milliGRAM(s) Oral every 2 hours PRN        LABS:    No lab studies performed today    RADIOLOGY:    No imaging studies performed today    PHYSICAL EXAM:  VITAL SIGNS: Last 24 Hours  T(C): --  T(F): --  HR: --  BP: --  BP(mean): --  RR: --  SpO2: --      GENERAL: Comfortable in bed - obtunded   PULMONARY: Bilateral rhonchi   - on O2 NC - bradypneic   CARDIOVASCULAR: Regular S1S2 - systolic ejection murmur    GASTROINTESTINAL: Soft, Nontender, Nondistended  MUSCULOSKELETAL:  + Peripheral Pulses, Lower extremity edema - extremities cold to touch       ASSESSMENT & PLAN    89 year old lady with a PMHx of HTN presenting to the ED with difficulty breathing, found to be in new onset Afib in ED    #) New Onset Atrial Fibrillation - reverted to NSR   -CHADSVASC 4  -Heparin drip stopped as Drop in Hb to 6.4 02/27 - s/p 3 units stable since   -Metoprolol 25 q8 + Cardizem 30 q8  -cardio : Metoprolol as above - Heparin 5000 BID - No long term anticoagulation until liver pathology worked up   Hospice discussed with family, comfort measures only for the time being - cardio meds discontinued     #) Hypotension, Anemia Hb 6.4 post heparin instatement - resolved , BRBPR 02/28  - Transfused 3 units so far - Hb stable - Active type and screen - hemodynamically stable   - CT scan abdomen and pelvis no contrast showed collection next to left gamma nail hematoma vs seroma -> spoke with orthopedics regarding results - per ortho normal finding post-op / chronic / not concerning / not exacerbated by heparin and wouldn't explain drop in Hb   - S/P cefepime (per ID because of lower opacities on CT scan)   - BRBPR : stercoral colitis vs thrombosed hemorrhoids -> bowel regimen with tap water enema q6 + Miralax twice daily + senna 2 tabs at bedtime   Hospice discussed with family, comfort measures only for the time being - GI meds and suppositories discontinued     #) Stage IV sacral ulcer   - S/P bedside debridement by Dr. Meléndez 02/27  - wound care once daily    #) Lower Extremity edema   -BNP 6349  -trop 0.04 -> 0.03 -> 0.03  -TTE (1/2019): EF 60%, moderate AS - Repeat TTE EF 60 % with Grade I diastolic dysfunction, moderate to severe AS     #) BENI on CKD - stable   -likely prerenal due to hypotension  -baseline Cr 1.0 1/2019 - last creatinine ~ 1.5  -Nephro : Off antibiotics - off Lasix - gentle IVF - Continue Short   Hospice discussed with family, comfort measures only for the time being - IVF discontinued     #) Findings consistent with liver cirrhosis and liver masses on US and CT scan - discussed results with son Inocencio, per son patient has no history of chronic liver disease and malignancy - at this time doesn't want workup   - Mixed cholestatic and hepatocellular - normal INR and normal bilirubin - Low Albumin    - Hepatitis panel : Reactive for hepatitis C     #) Folic Acid Deficiency   Continue with Folic acid.     #) Goals of care : Discussion made with family between hospice and palliative care team and family as condition deteriorated - family decided to go with comfort measures only with no oral / IV meds, no IVF, no blood draws and no imaging studies - MOLST form updated     # DVT prophylaxis : No AC for now   # GI prophylaxis : X  # Activity : Increase as tolerated   # Diet : Nectar thick   # Code : DNR / DNI - MOLST form signed and in chart  # Disposition : In hospital - Patient unstable to be transferred to a hospice facility

## 2019-03-08 NOTE — DISCHARGE NOTE FOR THE EXPIRED PATIENT - HOSPITAL COURSE
Hospital course : Patient was admitted for hypotension - A VQ scan was performed and showed low probability for PE. A venous duplex of the lower extremities showed no evidence of DVT - Patient found to be in Afib not controlled on Cardizem - Transferred to [facility] for monitoring, Metoprolol added - 02/27 drop in Hb to 6.4, 3 units RBC given with improvement of Hb, CT scan non contrast performed and showed a collection (hematoma vs seroma) next to the left gamma nail -> ortho normal post-op finding not acute bleeding 2/2 heparin -Palliative care discussion between palliative car team and family on 02/28 02/28 : patient had brown movements mixed with blood - hemodynamically stable - STAT CBC showed no drop in Hb - patient was seen by GI MALCOLM performed and showed thrombosed hemorrhoids as well as rectal prolapse - patient started on bowel regimen to resolve disimpaction (stercoral colitis?) - patient has been sleeping more frequently, decreased PO intake and decreased urine output   03/01 : sinus rhythm noted on Telemetry   03/03 to 03/04 : clinical deterioration noted as patient becoming more lethargic and less responsive, respiratory distress and upper airway congestion - Plan of care discussed between palliative care / hospice and family and comfort measures decided - antibiotics, medications and IVF discontinue and patient started on Morphine and Scopolamine patch  03/04 to 03/08 : patient comfortable in her bed, obtundation noted - pronounced dead on 03/ Hospital course : Patient was admitted for hypotension - A VQ scan was performed and showed low probability for PE. A venous duplex of the lower extremities showed no evidence of DVT - Patient found to be in Afib not controlled on Cardizem - Transferred to [facility] for monitoring, Metoprolol added - 02/27 drop in Hb to 6.4, 3 units RBC given with improvement of Hb, CT scan non contrast performed and showed a collection (hematoma vs seroma) next to the left gamma nail -> ortho normal post-op finding not acute bleeding 2/2 heparin -Palliative care discussion between palliative car team and family on 02/28 02/28 : patient had brown movements mixed with blood - hemodynamically stable - STAT CBC showed no drop in Hb - patient was seen by GI MALCOLM performed and showed thrombosed hemorrhoids as well as rectal prolapse - patient started on bowel regimen to resolve disimpaction (stercoral colitis?) - patient has been sleeping more frequently, decreased PO intake and decreased urine output   03/01 : sinus rhythm noted on Telemetry   03/03 to 03/04 : clinical deterioration noted as patient becoming more lethargic and less responsive, respiratory distress and upper airway congestion - Plan of care discussed between palliative care / hospice and family and comfort measures decided - antibiotics, medications and IVF discontinued and patient started on Morphine and Scopolamine patch  03/04 to 03/08 : patient comfortable in her bed, obtundation noted - pronounced dead on 03/08 at 6:45 AM

## 2019-03-08 NOTE — DISCHARGE NOTE FOR THE EXPIRED PATIENT - NS PATIENT DEATH CRITERIA
Patient is dead based on Cardiopulmonary criteria including absent breath sounds, pulselessness and/or asystole/Absence of spontaneous breathing, absent heart sounds and no corneal reflex

## 2019-03-09 LAB
CULTURE RESULTS: SIGNIFICANT CHANGE UP
SPECIMEN SOURCE: SIGNIFICANT CHANGE UP

## 2019-03-20 DIAGNOSIS — N17.9 ACUTE KIDNEY FAILURE, UNSPECIFIED: ICD-10-CM

## 2019-03-20 DIAGNOSIS — J15.6 PNEUMONIA DUE TO OTHER GRAM-NEGATIVE BACTERIA: ICD-10-CM

## 2019-03-20 DIAGNOSIS — Z51.5 ENCOUNTER FOR PALLIATIVE CARE: ICD-10-CM

## 2019-03-20 DIAGNOSIS — L89.154 PRESSURE ULCER OF SACRAL REGION, STAGE 4: ICD-10-CM

## 2019-03-20 DIAGNOSIS — K74.60 UNSPECIFIED CIRRHOSIS OF LIVER: ICD-10-CM

## 2019-03-20 DIAGNOSIS — F03.90 UNSPECIFIED DEMENTIA WITHOUT BEHAVIORAL DISTURBANCE: ICD-10-CM

## 2019-03-20 DIAGNOSIS — N18.9 CHRONIC KIDNEY DISEASE, UNSPECIFIED: ICD-10-CM

## 2019-03-20 DIAGNOSIS — I27.20 PULMONARY HYPERTENSION, UNSPECIFIED: ICD-10-CM

## 2019-03-20 DIAGNOSIS — E87.6 HYPOKALEMIA: ICD-10-CM

## 2019-03-20 DIAGNOSIS — R16.0 HEPATOMEGALY, NOT ELSEWHERE CLASSIFIED: ICD-10-CM

## 2019-03-20 DIAGNOSIS — I48.91 UNSPECIFIED ATRIAL FIBRILLATION: ICD-10-CM

## 2019-03-20 DIAGNOSIS — I13.0 HYPERTENSIVE HEART AND CHRONIC KIDNEY DISEASE WITH HEART FAILURE AND STAGE 1 THROUGH STAGE 4 CHRONIC KIDNEY DISEASE, OR UNSPECIFIED CHRONIC KIDNEY DISEASE: ICD-10-CM

## 2019-03-20 DIAGNOSIS — A41.9 SEPSIS, UNSPECIFIED ORGANISM: ICD-10-CM

## 2019-03-20 DIAGNOSIS — I08.0 RHEUMATIC DISORDERS OF BOTH MITRAL AND AORTIC VALVES: ICD-10-CM

## 2019-03-20 DIAGNOSIS — L89.222 PRESSURE ULCER OF LEFT HIP, STAGE 2: ICD-10-CM

## 2019-03-20 DIAGNOSIS — I50.33 ACUTE ON CHRONIC DIASTOLIC (CONGESTIVE) HEART FAILURE: ICD-10-CM

## 2019-03-20 DIAGNOSIS — E53.8 DEFICIENCY OF OTHER SPECIFIED B GROUP VITAMINS: ICD-10-CM

## 2020-05-28 NOTE — ED PROVIDER NOTE - NS ED ATTENDING STATEMENT MOD
I have personally performed a face to face diagnostic evaluation on this patient. I have reviewed the ACP note and agree with the history, exam and plan of care, except as noted. - - -

## 2022-01-25 NOTE — PATIENT PROFILE ADULT - NSPROEDALEARNPREF_GEN_A_NUR
Patient arrives after getting an US done of her right leg  \"they found something\", she is unsure of what they found.   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   She's been having increased pain in her groin and swelling which is why they did an US   unable to assess, pt disoriented

## 2022-11-02 NOTE — DIETITIAN INITIAL EVALUATION ADULT. - PERTINENT MEDS FT
November 2, 2022     Patient: Pamalee Closs  YOB: 2012  Date of Visit: 11/2/2022      To Whom it May Concern:    Pamalee Closs is under my professional care  Cabrera Otrega was seen in my office on 11/2/2022  Cabrera Ortega may return to school on 11/03/22  If you have any questions or concerns, please don't hesitate to call           Sincerely,          Bambi Gant
aspirin, bisacodyl, vitamin C, calcium carbonate + vitamin D, docusate, iron, b9, ondansetron, oxy, senna, miralax
